# Patient Record
Sex: FEMALE | Race: WHITE | NOT HISPANIC OR LATINO | Employment: FULL TIME | URBAN - NONMETROPOLITAN AREA
[De-identification: names, ages, dates, MRNs, and addresses within clinical notes are randomized per-mention and may not be internally consistent; named-entity substitution may affect disease eponyms.]

---

## 2017-03-01 ENCOUNTER — HOSPITAL ENCOUNTER (OUTPATIENT)
Dept: GENERAL RADIOLOGY | Facility: HOSPITAL | Age: 47
Discharge: HOME | End: 2017-03-01
Attending: UROLOGY | Admitting: UROLOGY

## 2017-03-01 DIAGNOSIS — M79.601 PAIN OF RIGHT UPPER EXTREMITY: ICD-10-CM

## 2017-03-01 DIAGNOSIS — M79.601 PAIN OF RIGHT UPPER EXTREMITY: Primary | ICD-10-CM

## 2017-03-01 PROCEDURE — 73080 X-RAY EXAM OF ELBOW: CPT | Performed by: RADIOLOGY

## 2017-03-01 PROCEDURE — 73070 X-RAY EXAM OF ELBOW: CPT

## 2017-07-10 ENCOUNTER — TRANSCRIBE ORDERS (OUTPATIENT)
Dept: ADMINISTRATIVE | Facility: HOSPITAL | Age: 47
End: 2017-07-10

## 2017-07-10 DIAGNOSIS — Z12.31 VISIT FOR SCREENING MAMMOGRAM: Primary | ICD-10-CM

## 2017-07-21 ENCOUNTER — HOSPITAL ENCOUNTER (OUTPATIENT)
Dept: MAMMOGRAPHY | Facility: HOSPITAL | Age: 47
Discharge: HOME | End: 2017-07-21
Admitting: NURSE PRACTITIONER

## 2017-07-21 DIAGNOSIS — Z12.31 VISIT FOR SCREENING MAMMOGRAM: ICD-10-CM

## 2017-07-21 PROCEDURE — G0202 SCR MAMMO BI INCL CAD: HCPCS

## 2017-07-21 PROCEDURE — 77063 BREAST TOMOSYNTHESIS BI: CPT

## 2017-07-21 PROCEDURE — 77063 BREAST TOMOSYNTHESIS BI: CPT | Performed by: RADIOLOGY

## 2017-07-21 PROCEDURE — 77067 SCR MAMMO BI INCL CAD: CPT | Performed by: RADIOLOGY

## 2017-09-02 ENCOUNTER — HOSPITAL ENCOUNTER (EMERGENCY)
Facility: HOSPITAL | Age: 47
Discharge: HOME | End: 2017-09-02
Attending: EMERGENCY MEDICINE | Admitting: EMERGENCY MEDICINE

## 2017-09-02 ENCOUNTER — APPOINTMENT (OUTPATIENT)
Dept: GENERAL RADIOLOGY | Facility: HOSPITAL | Age: 47
End: 2017-09-02

## 2017-09-02 VITALS
RESPIRATION RATE: 16 BRPM | SYSTOLIC BLOOD PRESSURE: 151 MMHG | DIASTOLIC BLOOD PRESSURE: 80 MMHG | TEMPERATURE: 98.6 F | OXYGEN SATURATION: 98 % | BODY MASS INDEX: 31.58 KG/M2 | HEART RATE: 61 BPM | WEIGHT: 185 LBS | HEIGHT: 64 IN
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
## 2017-09-02 DIAGNOSIS — S80.01XA CONTUSION OF RIGHT KNEE, INITIAL ENCOUNTER: Primary | ICD-10-CM

## 2017-09-02 PROCEDURE — 73564 X-RAY EXAM KNEE 4 OR MORE: CPT | Performed by: RADIOLOGY

## 2017-09-02 PROCEDURE — 99283 EMERGENCY DEPT VISIT LOW MDM: CPT

## 2017-09-02 PROCEDURE — 73564 X-RAY EXAM KNEE 4 OR MORE: CPT

## 2017-09-02 RX ORDER — IBUPROFEN 600 MG/1
600 TABLET ORAL EVERY 8 HOURS PRN
Qty: 20 TABLET | Refills: 0 | Status: SHIPPED | OUTPATIENT
Start: 2017-09-02 | End: 2019-01-07

## 2017-09-02 RX ORDER — MULTIPLE VITAMINS W/ MINERALS TAB 9MG-400MCG
1 TAB ORAL DAILY
COMMUNITY
End: 2021-10-07

## 2017-09-02 NOTE — DISCHARGE INSTRUCTIONS
Follow up with [facility] on Tuesday for a re-evaluation and to determine when you can return to work.  Wear your ace wrap and ice and elevate.    Hypertension  Hypertension, commonly called high blood pressure, is when the force of blood pumping through your arteries is too strong. Your arteries are the blood vessels that carry blood from your heart throughout your body. A blood pressure reading consists of a higher number over a lower number, such as 110/72. The higher number (systolic) is the pressure inside your arteries when your heart pumps. The lower number (diastolic) is the pressure inside your arteries when your heart relaxes. Ideally you want your blood pressure below 120/80.  Hypertension forces your heart to work harder to pump blood. Your arteries may become narrow or stiff. Having untreated or uncontrolled hypertension can cause heart attack, stroke, kidney disease, and other problems.  RISK FACTORS  Some risk factors for high blood pressure are controllable. Others are not.   Risk factors you cannot control include:   · Race. You may be at higher risk if you are .  · Age. Risk increases with age.  · Gender. Men are at higher risk than women before age 45 years. After age 65, women are at higher risk than men.  Risk factors you can control include:  · Not getting enough exercise or physical activity.  · Being overweight.  · Getting too much fat, sugar, calories, or salt in your diet.  · Drinking too much alcohol.  SIGNS AND SYMPTOMS  Hypertension does not usually cause signs or symptoms. Extremely high blood pressure (hypertensive crisis) may cause headache, anxiety, shortness of breath, and nosebleed.  DIAGNOSIS  To check if you have hypertension, your health care provider will measure your blood pressure while you are seated, with your arm held at the level of your heart. It should be measured at least twice using the same arm. Certain conditions can cause a  difference in blood pressure between your right and left arms. A blood pressure reading that is higher than normal on one occasion does not mean that you need treatment. If it is not clear whether you have high blood pressure, you may be asked to return on a different day to have your blood pressure checked again. Or, you may be asked to monitor your blood pressure at home for 1 or more weeks.  TREATMENT  Treating high blood pressure includes making lifestyle changes and possibly taking medicine. Living a healthy lifestyle can help lower high blood pressure. You may need to change some of your habits.  Lifestyle changes may include:  · Following the DASH diet. This diet is high in fruits, vegetables, and whole grains. It is low in salt, red meat, and added sugars.  · Keep your sodium intake below 2,300 mg per day.  · Getting at least 30-45 minutes of aerobic exercise at least 4 times per week.  · Losing weight if necessary.  · Not smoking.  · Limiting alcoholic beverages.  · Learning ways to reduce stress.  Your health care provider may prescribe medicine if lifestyle changes are not enough to get your blood pressure under control, and if one of the following is true:  · You are 18-59 years of age and your systolic blood pressure is above 140.  · You are 60 years of age or older, and your systolic blood pressure is above 150.  · Your diastolic blood pressure is above 90.  · You have diabetes, and your systolic blood pressure is over 140 or your diastolic blood pressure is over 90.  · You have kidney disease and your blood pressure is above 140/90.  · You have heart disease and your blood pressure is above 140/90.  Your personal target blood pressure may vary depending on your medical conditions, your age, and other factors.  HOME CARE INSTRUCTIONS  · Have your blood pressure rechecked as directed by your health care provider.    · Take medicines only as directed by your health care provider. Follow the directions  carefully. Blood pressure medicines must be taken as prescribed. The medicine does not work as well when you skip doses. Skipping doses also puts you at risk for problems.  · Do not smoke.    · Monitor your blood pressure at home as directed by your health care provider.   SEEK MEDICAL CARE IF:   · You think you are having a reaction to medicines taken.  · You have recurrent headaches or feel dizzy.  · You have swelling in your ankles.  · You have trouble with your vision.  SEEK IMMEDIATE MEDICAL CARE IF:  · You develop a severe headache or confusion.  · You have unusual weakness, numbness, or feel faint.  · You have severe chest or abdominal pain.  · You vomit repeatedly.  · You have trouble breathing.  MAKE SURE YOU:   · Understand these instructions.  · Will watch your condition.  · Will get help right away if you are not doing well or get worse.     This information is not intended to replace advice given to you by your health care provider. Make sure you discuss any questions you have with your health care provider.     Document Released: 12/18/2006 Document Revised: 05/03/2016 Document Reviewed: 10/10/2014  [employer] Patient Education ©2017 [employer]

## 2017-09-02 NOTE — ED PROVIDER NOTES
Subjective   HPI Comments: 47-year-old female presents to the ED today complaining of right knee pain.  She states she fell yesterday at work.  She states she was coming down some steps and slipped and landed on her right knee.  She states she has pain with weightbearing and pain with flexion.  She denies any past injury to this knee.  She states she is able to bear weight but it is painful.    Patient is a 47 y.o. female presenting with lower extremity pain.   History provided by:  Patient  Lower Extremity Issue   Location:  Knee  Time since incident:  1 day  Injury: yes    Mechanism of injury: fall    Fall:     Fall occurred:  Down stairs    Impact surface:  Hard floor    Point of impact:  Knees    Entrapped after fall: no    Knee location:  R knee  Pain details:     Quality:  Throbbing    Radiates to:  Does not radiate    Severity:  Moderate    Onset quality:  Sudden    Timing:  Constant    Progression:  Worsening  Chronicity:  New  Dislocation: no    Prior injury to area:  No  Relieved by:  Nothing  Worsened by:  Activity and bearing weight  Associated symptoms: no back pain, no decreased ROM, no fatigue, no fever, no itching, no muscle weakness, no neck pain, no numbness, no stiffness, no swelling and no tingling        Review of Systems   Constitutional: Negative for fatigue and fever.   HENT: Negative.    Eyes: Negative.    Respiratory: Negative.    Cardiovascular: Negative.    Gastrointestinal: Negative.    Genitourinary: Negative.    Musculoskeletal: Positive for arthralgias. Negative for back pain, neck pain and stiffness.   Skin: Negative.  Negative for itching.   Neurological: Negative.    Psychiatric/Behavioral: Negative.    All other systems reviewed and are negative.      History reviewed. No pertinent past medical history.    Allergies   Allergen Reactions   • Penicillins        Past Surgical History:   Procedure Laterality Date   • TUBAL ABDOMINAL LIGATION         History reviewed. No pertinent  family history.    Social History     Social History   • Marital status:      Spouse name: N/A   • Number of children: N/A   • Years of education: N/A     Social History Main Topics   • Smoking status: Never Smoker   • Smokeless tobacco: None   • Alcohol use No   • Drug use: No   • Sexual activity: Defer     Other Topics Concern   • None     Social History Narrative   • None           Objective   Physical Exam   Constitutional: She is oriented to person, place, and time. She appears well-developed and well-nourished. No distress.   HENT:   Head: Normocephalic and atraumatic.   Right Ear: External ear normal.   Left Ear: External ear normal.   Nose: Nose normal.   Mouth/Throat: Oropharynx is clear and moist.   Eyes: Conjunctivae and EOM are normal. Pupils are equal, round, and reactive to light.   Neck: Normal range of motion. Neck supple.   Cardiovascular: Normal rate, regular rhythm, normal heart sounds and intact distal pulses.    Pulmonary/Chest: Effort normal and breath sounds normal. No respiratory distress.   Abdominal: Soft. Bowel sounds are normal. There is no tenderness.   Musculoskeletal: She exhibits tenderness. She exhibits no deformity.   Mild pain with flexion of the right knee, pain with palpation just below the right patella   Neurological: She is alert and oriented to person, place, and time.   Skin: Skin is warm and dry.   Psychiatric: She has a normal mood and affect. Her behavior is normal. Judgment and thought content normal.   Nursing note and vitals reviewed.      Splint application  Performed by: MJ GRANADOS  Authorized by: JANEE GARCIA   Location details: right knee  Splint type: ace wrap.  Supplies used: elastic bandage  Post-procedure: The splinted body part was neurovascularly unchanged following the procedure.  Patient tolerance: Patient tolerated the procedure well with no immediate complications               ED Course  ED Course   Comment By Time   No acute findings on x-ray  per Dr. Quiroz. LIA Ceron 09/02 1058                  MDM  Number of Diagnoses or Management Options  Contusion of right knee, initial encounter:   Fall down stairs, initial encounter:      Amount and/or Complexity of Data Reviewed  Tests in the radiology section of CPT®: reviewed    Patient Progress  Patient progress: stable      Final diagnoses:   Contusion of right knee, initial encounter   Fall down stairs, initial encounter            LIA Ceron  09/02/17 1518

## 2018-07-26 ENCOUNTER — APPOINTMENT (OUTPATIENT)
Dept: MAMMOGRAPHY | Facility: HOSPITAL | Age: 48
End: 2018-07-26

## 2018-07-30 ENCOUNTER — HOSPITAL ENCOUNTER (OUTPATIENT)
Dept: MAMMOGRAPHY | Facility: HOSPITAL | Age: 48
Discharge: HOME | End: 2018-07-30
Admitting: NURSE PRACTITIONER

## 2018-07-30 DIAGNOSIS — Z12.39 SCREENING BREAST EXAMINATION: ICD-10-CM

## 2018-07-30 PROCEDURE — 77067 SCR MAMMO BI INCL CAD: CPT | Performed by: RADIOLOGY

## 2018-07-30 PROCEDURE — 77067 SCR MAMMO BI INCL CAD: CPT

## 2018-07-30 PROCEDURE — 77063 BREAST TOMOSYNTHESIS BI: CPT

## 2018-07-30 PROCEDURE — 77063 BREAST TOMOSYNTHESIS BI: CPT | Performed by: RADIOLOGY

## 2018-10-02 ENCOUNTER — APPOINTMENT (OUTPATIENT)
Dept: GENERAL RADIOLOGY | Facility: HOSPITAL | Age: 48
End: 2018-10-02
Attending: SURGERY

## 2018-10-02 ENCOUNTER — APPOINTMENT (OUTPATIENT)
Dept: CT IMAGING | Facility: HOSPITAL | Age: 48
End: 2018-10-02

## 2018-10-02 ENCOUNTER — ANESTHESIA (OUTPATIENT)
Dept: PERIOP | Facility: HOSPITAL | Age: 48
End: 2018-10-02

## 2018-10-02 ENCOUNTER — ANESTHESIA EVENT (OUTPATIENT)
Dept: PERIOP | Facility: HOSPITAL | Age: 48
End: 2018-10-02

## 2018-10-02 ENCOUNTER — HOSPITAL ENCOUNTER (EMERGENCY)
Facility: HOSPITAL | Age: 48
Discharge: HOME | End: 2018-10-02
Attending: EMERGENCY MEDICINE | Admitting: SURGERY

## 2018-10-02 VITALS
WEIGHT: 185 LBS | HEART RATE: 91 BPM | OXYGEN SATURATION: 97 % | BODY MASS INDEX: 31.58 KG/M2 | TEMPERATURE: 98.8 F | HEIGHT: 64 IN | DIASTOLIC BLOOD PRESSURE: 56 MMHG | SYSTOLIC BLOOD PRESSURE: 133 MMHG | RESPIRATION RATE: 16 BRPM

## 2018-10-02 DIAGNOSIS — N39.0 ACUTE LOWER UTI: ICD-10-CM

## 2018-10-02 DIAGNOSIS — K35.80 ACUTE APPENDICITIS, UNSPECIFIED ACUTE APPENDICITIS TYPE: Primary | ICD-10-CM

## 2018-10-02 LAB
6-ACETYL MORPHINE: NEGATIVE
ALBUMIN SERPL-MCNC: 4.9 G/DL (ref 3.5–5)
ALBUMIN/GLOB SERPL: 1.4 G/DL (ref 1.5–2.5)
ALP SERPL-CCNC: 117 U/L (ref 35–104)
ALT SERPL W P-5'-P-CCNC: 40 U/L (ref 10–36)
AMPHET+METHAMPHET UR QL: NEGATIVE
ANION GAP SERPL CALCULATED.3IONS-SCNC: 13.8 MMOL/L (ref 3.6–11.2)
AST SERPL-CCNC: 29 U/L (ref 10–30)
B-HCG UR QL: NEGATIVE
BACTERIA UR QL AUTO: ABNORMAL /HPF
BARBITURATES UR QL SCN: NEGATIVE
BASOPHILS # BLD AUTO: 0.02 10*3/MM3 (ref 0–0.3)
BASOPHILS NFR BLD AUTO: 0.2 % (ref 0–2)
BENZODIAZ UR QL SCN: NEGATIVE
BILIRUB SERPL-MCNC: 0.9 MG/DL (ref 0.2–1.8)
BILIRUB UR QL STRIP: ABNORMAL
BUN BLD-MCNC: 12 MG/DL (ref 7–21)
BUN/CREAT SERPL: 11.3 (ref 7–25)
BUPRENORPHINE SERPL-MCNC: NEGATIVE NG/ML
CALCIUM SPEC-SCNC: 9.7 MG/DL (ref 7.7–10)
CANNABINOIDS SERPL QL: NEGATIVE
CHLORIDE SERPL-SCNC: 101 MMOL/L (ref 99–112)
CLARITY UR: ABNORMAL
CO2 SERPL-SCNC: 20.2 MMOL/L (ref 24.3–31.9)
COCAINE UR QL: NEGATIVE
COLOR UR: ABNORMAL
CREAT BLD-MCNC: 1.06 MG/DL (ref 0.43–1.29)
DEPRECATED RDW RBC AUTO: 45.4 FL (ref 37–54)
EOSINOPHIL # BLD AUTO: 0.01 10*3/MM3 (ref 0–0.7)
EOSINOPHIL NFR BLD AUTO: 0.1 % (ref 0–5)
ERYTHROCYTE [DISTWIDTH] IN BLOOD BY AUTOMATED COUNT: 14.3 % (ref 11.5–14.5)
GFR SERPL CREATININE-BSD FRML MDRD: 55 ML/MIN/1.73
GLOBULIN UR ELPH-MCNC: 3.4 GM/DL
GLUCOSE BLD-MCNC: 119 MG/DL (ref 70–110)
GLUCOSE UR STRIP-MCNC: NEGATIVE MG/DL
HCT VFR BLD AUTO: 42.6 % (ref 37–47)
HGB BLD-MCNC: 14.1 G/DL (ref 12–16)
HGB UR QL STRIP.AUTO: ABNORMAL
HYALINE CASTS UR QL AUTO: ABNORMAL /LPF
IMM GRANULOCYTES # BLD: 0.02 10*3/MM3 (ref 0–0.03)
IMM GRANULOCYTES NFR BLD: 0.2 % (ref 0–0.5)
KETONES UR QL STRIP: ABNORMAL
LEUKOCYTE ESTERASE UR QL STRIP.AUTO: ABNORMAL
LIPASE SERPL-CCNC: 44 U/L (ref 13–60)
LYMPHOCYTES # BLD AUTO: 1.68 10*3/MM3 (ref 1–3)
LYMPHOCYTES NFR BLD AUTO: 13.6 % (ref 21–51)
MCH RBC QN AUTO: 28.8 PG (ref 27–33)
MCHC RBC AUTO-ENTMCNC: 33.1 G/DL (ref 33–37)
MCV RBC AUTO: 87.1 FL (ref 80–94)
METHADONE UR QL SCN: NEGATIVE
MONOCYTES # BLD AUTO: 0.83 10*3/MM3 (ref 0.1–0.9)
MONOCYTES NFR BLD AUTO: 6.7 % (ref 0–10)
NEUTROPHILS # BLD AUTO: 9.78 10*3/MM3 (ref 1.4–6.5)
NEUTROPHILS NFR BLD AUTO: 79.2 % (ref 30–70)
NITRITE UR QL STRIP: NEGATIVE
OPIATES UR QL: NEGATIVE
OSMOLALITY SERPL CALC.SUM OF ELEC: 271 MOSM/KG (ref 273–305)
OXYCODONE UR QL SCN: NEGATIVE
PCP UR QL SCN: NEGATIVE
PH UR STRIP.AUTO: 5.5 [PH] (ref 5–8)
PLATELET # BLD AUTO: 285 10*3/MM3 (ref 130–400)
PMV BLD AUTO: 10.6 FL (ref 6–10)
POTASSIUM BLD-SCNC: 3.4 MMOL/L (ref 3.5–5.3)
PROT SERPL-MCNC: 8.3 G/DL (ref 6–8)
PROT UR QL STRIP: ABNORMAL
RBC # BLD AUTO: 4.89 10*6/MM3 (ref 4.2–5.4)
RBC # UR: ABNORMAL /HPF
REF LAB TEST METHOD: ABNORMAL
SODIUM BLD-SCNC: 135 MMOL/L (ref 135–153)
SP GR UR STRIP: 1.03 (ref 1–1.03)
SQUAMOUS #/AREA URNS HPF: ABNORMAL /HPF
TRANS CELLS #/AREA URNS HPF: ABNORMAL /HPF
UROBILINOGEN UR QL STRIP: ABNORMAL
WBC NRBC COR # BLD: 12.34 10*3/MM3 (ref 4.5–12.5)
WBC UR QL AUTO: ABNORMAL /HPF
YEAST URNS QL MICRO: ABNORMAL /HPF

## 2018-10-02 PROCEDURE — 25010000002 KETOROLAC TROMETHAMINE PER 15 MG: Performed by: NURSE ANESTHETIST, CERTIFIED REGISTERED

## 2018-10-02 PROCEDURE — 80307 DRUG TEST PRSMV CHEM ANLYZR: CPT | Performed by: PHYSICIAN ASSISTANT

## 2018-10-02 PROCEDURE — 74176 CT ABD & PELVIS W/O CONTRAST: CPT

## 2018-10-02 PROCEDURE — 96375 TX/PRO/DX INJ NEW DRUG ADDON: CPT

## 2018-10-02 PROCEDURE — 25010000002 DEXAMETHASONE PER 1 MG: Performed by: NURSE ANESTHETIST, CERTIFIED REGISTERED

## 2018-10-02 PROCEDURE — 81025 URINE PREGNANCY TEST: CPT | Performed by: EMERGENCY MEDICINE

## 2018-10-02 PROCEDURE — 74176 CT ABD & PELVIS W/O CONTRAST: CPT | Performed by: RADIOLOGY

## 2018-10-02 PROCEDURE — 25010000002 ONDANSETRON PER 1 MG: Performed by: NURSE ANESTHETIST, CERTIFIED REGISTERED

## 2018-10-02 PROCEDURE — 25010000002 LEVOFLOXACIN PER 250 MG: Performed by: PHYSICIAN ASSISTANT

## 2018-10-02 PROCEDURE — 44970 LAPAROSCOPY APPENDECTOMY: CPT | Performed by: SURGERY

## 2018-10-02 PROCEDURE — 25010000002 KETOROLAC TROMETHAMINE PER 15 MG: Performed by: PHYSICIAN ASSISTANT

## 2018-10-02 PROCEDURE — 85025 COMPLETE CBC W/AUTO DIFF WBC: CPT | Performed by: PHYSICIAN ASSISTANT

## 2018-10-02 PROCEDURE — 25010000002 MIDAZOLAM PER 1 MG: Performed by: NURSE ANESTHETIST, CERTIFIED REGISTERED

## 2018-10-02 PROCEDURE — 99284 EMERGENCY DEPT VISIT MOD MDM: CPT | Performed by: SURGERY

## 2018-10-02 PROCEDURE — 99283 EMERGENCY DEPT VISIT LOW MDM: CPT

## 2018-10-02 PROCEDURE — 83690 ASSAY OF LIPASE: CPT | Performed by: PHYSICIAN ASSISTANT

## 2018-10-02 PROCEDURE — 25010000002 PROPOFOL 10 MG/ML EMULSION: Performed by: NURSE ANESTHETIST, CERTIFIED REGISTERED

## 2018-10-02 PROCEDURE — 96361 HYDRATE IV INFUSION ADD-ON: CPT

## 2018-10-02 PROCEDURE — 36415 COLL VENOUS BLD VENIPUNCTURE: CPT

## 2018-10-02 PROCEDURE — 96365 THER/PROPH/DIAG IV INF INIT: CPT

## 2018-10-02 PROCEDURE — 81001 URINALYSIS AUTO W/SCOPE: CPT | Performed by: PHYSICIAN ASSISTANT

## 2018-10-02 PROCEDURE — 80053 COMPREHEN METABOLIC PANEL: CPT | Performed by: PHYSICIAN ASSISTANT

## 2018-10-02 RX ORDER — SCOLOPAMINE TRANSDERMAL SYSTEM 1 MG/1
1 PATCH, EXTENDED RELEASE TRANSDERMAL CONTINUOUS
Status: DISCONTINUED | OUTPATIENT
Start: 2018-10-02 | End: 2018-10-02 | Stop reason: HOSPADM

## 2018-10-02 RX ORDER — SODIUM CHLORIDE 0.9 % (FLUSH) 0.9 %
3-10 SYRINGE (ML) INJECTION AS NEEDED
Status: DISCONTINUED | OUTPATIENT
Start: 2018-10-02 | End: 2018-10-02 | Stop reason: HOSPADM

## 2018-10-02 RX ORDER — MEPERIDINE HYDROCHLORIDE 25 MG/ML
12.5 INJECTION INTRAMUSCULAR; INTRAVENOUS; SUBCUTANEOUS
Status: DISCONTINUED | OUTPATIENT
Start: 2018-10-02 | End: 2018-10-02 | Stop reason: HOSPADM

## 2018-10-02 RX ORDER — LIDOCAINE HYDROCHLORIDE 20 MG/ML
INJECTION, SOLUTION EPIDURAL; INFILTRATION; INTRACAUDAL; PERINEURAL AS NEEDED
Status: DISCONTINUED | OUTPATIENT
Start: 2018-10-02 | End: 2018-10-02 | Stop reason: SURG

## 2018-10-02 RX ORDER — OXYCODONE AND ACETAMINOPHEN 7.5; 325 MG/1; MG/1
1 TABLET ORAL EVERY 6 HOURS PRN
Qty: 25 TABLET | Refills: 0 | Status: SHIPPED | OUTPATIENT
Start: 2018-10-02 | End: 2018-10-10 | Stop reason: SDUPTHER

## 2018-10-02 RX ORDER — METOPROLOL TARTRATE 5 MG/5ML
INJECTION INTRAVENOUS AS NEEDED
Status: DISCONTINUED | OUTPATIENT
Start: 2018-10-02 | End: 2018-10-02 | Stop reason: SURG

## 2018-10-02 RX ORDER — MIDAZOLAM HYDROCHLORIDE 1 MG/ML
2 INJECTION INTRAMUSCULAR; INTRAVENOUS
Status: DISCONTINUED | OUTPATIENT
Start: 2018-10-02 | End: 2018-10-02 | Stop reason: HOSPADM

## 2018-10-02 RX ORDER — PROPOFOL 10 MG/ML
VIAL (ML) INTRAVENOUS AS NEEDED
Status: DISCONTINUED | OUTPATIENT
Start: 2018-10-02 | End: 2018-10-02 | Stop reason: SURG

## 2018-10-02 RX ORDER — FAMOTIDINE 10 MG/ML
INJECTION, SOLUTION INTRAVENOUS AS NEEDED
Status: DISCONTINUED | OUTPATIENT
Start: 2018-10-02 | End: 2018-10-02 | Stop reason: SURG

## 2018-10-02 RX ORDER — ONDANSETRON 2 MG/ML
4 INJECTION INTRAMUSCULAR; INTRAVENOUS ONCE AS NEEDED
Status: DISCONTINUED | OUTPATIENT
Start: 2018-10-02 | End: 2018-10-02 | Stop reason: HOSPADM

## 2018-10-02 RX ORDER — LEVOFLOXACIN 500 MG/1
500 TABLET, FILM COATED ORAL DAILY
Qty: 10 TABLET | Refills: 0 | Status: SHIPPED | OUTPATIENT
Start: 2018-10-02 | End: 2018-10-12

## 2018-10-02 RX ORDER — SODIUM CHLORIDE, SODIUM LACTATE, POTASSIUM CHLORIDE, CALCIUM CHLORIDE 600; 310; 30; 20 MG/100ML; MG/100ML; MG/100ML; MG/100ML
125 INJECTION, SOLUTION INTRAVENOUS CONTINUOUS
Status: DISCONTINUED | OUTPATIENT
Start: 2018-10-02 | End: 2018-10-02 | Stop reason: HOSPADM

## 2018-10-02 RX ORDER — ONDANSETRON 2 MG/ML
INJECTION INTRAMUSCULAR; INTRAVENOUS AS NEEDED
Status: DISCONTINUED | OUTPATIENT
Start: 2018-10-02 | End: 2018-10-02 | Stop reason: SURG

## 2018-10-02 RX ORDER — MIDAZOLAM HYDROCHLORIDE 1 MG/ML
1 INJECTION INTRAMUSCULAR; INTRAVENOUS
Status: DISCONTINUED | OUTPATIENT
Start: 2018-10-02 | End: 2018-10-02 | Stop reason: HOSPADM

## 2018-10-02 RX ORDER — DEXAMETHASONE SODIUM PHOSPHATE 10 MG/ML
INJECTION INTRAMUSCULAR; INTRAVENOUS AS NEEDED
Status: DISCONTINUED | OUTPATIENT
Start: 2018-10-02 | End: 2018-10-02 | Stop reason: SURG

## 2018-10-02 RX ORDER — SODIUM CHLORIDE 9 MG/ML
INJECTION, SOLUTION INTRAVENOUS AS NEEDED
Status: DISCONTINUED | OUTPATIENT
Start: 2018-10-02 | End: 2018-10-02 | Stop reason: HOSPADM

## 2018-10-02 RX ORDER — MIDAZOLAM HYDROCHLORIDE 1 MG/ML
INJECTION INTRAMUSCULAR; INTRAVENOUS AS NEEDED
Status: DISCONTINUED | OUTPATIENT
Start: 2018-10-02 | End: 2018-10-02 | Stop reason: SURG

## 2018-10-02 RX ORDER — SODIUM CHLORIDE 0.9 % (FLUSH) 0.9 %
3 SYRINGE (ML) INJECTION EVERY 12 HOURS SCHEDULED
Status: DISCONTINUED | OUTPATIENT
Start: 2018-10-02 | End: 2018-10-02 | Stop reason: HOSPADM

## 2018-10-02 RX ORDER — MEPERIDINE HYDROCHLORIDE 25 MG/ML
INJECTION INTRAMUSCULAR; INTRAVENOUS; SUBCUTANEOUS AS NEEDED
Status: DISCONTINUED | OUTPATIENT
Start: 2018-10-02 | End: 2018-10-02 | Stop reason: SURG

## 2018-10-02 RX ORDER — MAGNESIUM HYDROXIDE 1200 MG/15ML
LIQUID ORAL AS NEEDED
Status: DISCONTINUED | OUTPATIENT
Start: 2018-10-02 | End: 2018-10-02 | Stop reason: HOSPADM

## 2018-10-02 RX ORDER — SODIUM CHLORIDE 0.9 % (FLUSH) 0.9 %
10 SYRINGE (ML) INJECTION AS NEEDED
Status: CANCELLED | OUTPATIENT
Start: 2018-10-02

## 2018-10-02 RX ORDER — LEVOFLOXACIN 5 MG/ML
500 INJECTION, SOLUTION INTRAVENOUS ONCE
Status: COMPLETED | OUTPATIENT
Start: 2018-10-02 | End: 2018-10-02

## 2018-10-02 RX ORDER — KETOROLAC TROMETHAMINE 30 MG/ML
INJECTION, SOLUTION INTRAMUSCULAR; INTRAVENOUS AS NEEDED
Status: DISCONTINUED | OUTPATIENT
Start: 2018-10-02 | End: 2018-10-02 | Stop reason: SURG

## 2018-10-02 RX ORDER — IPRATROPIUM BROMIDE AND ALBUTEROL SULFATE 2.5; .5 MG/3ML; MG/3ML
3 SOLUTION RESPIRATORY (INHALATION) ONCE AS NEEDED
Status: DISCONTINUED | OUTPATIENT
Start: 2018-10-02 | End: 2018-10-02 | Stop reason: HOSPADM

## 2018-10-02 RX ORDER — FENTANYL CITRATE 50 UG/ML
50 INJECTION, SOLUTION INTRAMUSCULAR; INTRAVENOUS
Status: DISCONTINUED | OUTPATIENT
Start: 2018-10-02 | End: 2018-10-02 | Stop reason: HOSPADM

## 2018-10-02 RX ORDER — SODIUM CHLORIDE 0.9 % (FLUSH) 0.9 %
10 SYRINGE (ML) INJECTION AS NEEDED
Status: DISCONTINUED | OUTPATIENT
Start: 2018-10-02 | End: 2018-10-02 | Stop reason: HOSPADM

## 2018-10-02 RX ORDER — OXYCODONE HYDROCHLORIDE AND ACETAMINOPHEN 5; 325 MG/1; MG/1
1 TABLET ORAL ONCE AS NEEDED
Status: DISCONTINUED | OUTPATIENT
Start: 2018-10-02 | End: 2018-10-02 | Stop reason: HOSPADM

## 2018-10-02 RX ORDER — KETOROLAC TROMETHAMINE 30 MG/ML
30 INJECTION, SOLUTION INTRAMUSCULAR; INTRAVENOUS ONCE
Status: COMPLETED | OUTPATIENT
Start: 2018-10-02 | End: 2018-10-02

## 2018-10-02 RX ORDER — ROCURONIUM BROMIDE 10 MG/ML
INJECTION, SOLUTION INTRAVENOUS AS NEEDED
Status: DISCONTINUED | OUTPATIENT
Start: 2018-10-02 | End: 2018-10-02 | Stop reason: SURG

## 2018-10-02 RX ORDER — ONDANSETRON 4 MG/1
4 TABLET, ORALLY DISINTEGRATING ORAL EVERY 8 HOURS PRN
Qty: 15 TABLET | Refills: 0 | Status: SHIPPED | OUTPATIENT
Start: 2018-10-02 | End: 2019-01-07

## 2018-10-02 RX ADMIN — MIDAZOLAM HYDROCHLORIDE 2 MG: 1 INJECTION, SOLUTION INTRAMUSCULAR; INTRAVENOUS at 12:18

## 2018-10-02 RX ADMIN — LIDOCAINE HYDROCHLORIDE 60 MG: 20 INJECTION, SOLUTION EPIDURAL; INFILTRATION; INTRACAUDAL; PERINEURAL at 12:24

## 2018-10-02 RX ADMIN — MEPERIDINE HYDROCHLORIDE 25 MG: 25 INJECTION, SOLUTION INTRAMUSCULAR; INTRAVENOUS; SUBCUTANEOUS at 12:55

## 2018-10-02 RX ADMIN — ONDANSETRON 4 MG: 2 INJECTION, SOLUTION INTRAMUSCULAR; INTRAVENOUS at 12:18

## 2018-10-02 RX ADMIN — ROCURONIUM BROMIDE 30 MG: 10 INJECTION INTRAVENOUS at 12:25

## 2018-10-02 RX ADMIN — OXYCODONE HYDROCHLORIDE AND ACETAMINOPHEN 1 TABLET: 5; 325 TABLET ORAL at 15:53

## 2018-10-02 RX ADMIN — SODIUM CHLORIDE 2517 ML: 9 INJECTION, SOLUTION INTRAVENOUS at 09:46

## 2018-10-02 RX ADMIN — ROCURONIUM BROMIDE 5 MG: 10 INJECTION INTRAVENOUS at 13:03

## 2018-10-02 RX ADMIN — DEXAMETHASONE SODIUM PHOSPHATE 8 MG: 10 INJECTION INTRAMUSCULAR; INTRAVENOUS at 12:28

## 2018-10-02 RX ADMIN — SODIUM CHLORIDE, POTASSIUM CHLORIDE, SODIUM LACTATE AND CALCIUM CHLORIDE 125 ML/HR: 600; 310; 30; 20 INJECTION, SOLUTION INTRAVENOUS at 11:35

## 2018-10-02 RX ADMIN — SODIUM CHLORIDE 1000 ML: 9 INJECTION, SOLUTION INTRAVENOUS at 08:52

## 2018-10-02 RX ADMIN — LEVOFLOXACIN 500 MG: 5 INJECTION, SOLUTION INTRAVENOUS at 09:38

## 2018-10-02 RX ADMIN — MEPERIDINE HYDROCHLORIDE 25 MG: 25 INJECTION, SOLUTION INTRAMUSCULAR; INTRAVENOUS; SUBCUTANEOUS at 12:49

## 2018-10-02 RX ADMIN — METOPROLOL TARTRATE 2 MG: 1 INJECTION, SOLUTION INTRAVENOUS at 13:28

## 2018-10-02 RX ADMIN — KETOROLAC TROMETHAMINE 30 MG: 30 INJECTION, SOLUTION INTRAMUSCULAR; INTRAVENOUS at 13:16

## 2018-10-02 RX ADMIN — PROPOFOL 100 MG: 10 INJECTION, EMULSION INTRAVENOUS at 12:24

## 2018-10-02 RX ADMIN — KETOROLAC TROMETHAMINE 30 MG: 30 INJECTION, SOLUTION INTRAMUSCULAR at 08:52

## 2018-10-02 RX ADMIN — FAMOTIDINE 20 MG: 10 INJECTION, SOLUTION INTRAVENOUS at 12:28

## 2018-10-02 RX ADMIN — SCOPALAMINE 1 PATCH: 1 PATCH, EXTENDED RELEASE TRANSDERMAL at 11:33

## 2018-10-02 NOTE — OP NOTE
Laparoscopic Appendectomy Procedure Note    Surgeon:  Shey Vera M.D., ISAAC    Assistant;  Alejandro Carlson  10/2/2018    Pre-op Diagnosis:   appendicitis    Post-op Diagnosis:     appendicitis    Anesthesia:  general    Indication:  appendicitis    Procedure:  After obtaining informed consent and receiving preoperative antibiotic and with venous compression boots in place patient was taken to the operating room and placed in the supine position.  After administration of general endotracheal anesthesia the abdomen was prepped and draped in a sterile fashion.  An incision was made above the umbilicus and the Veress needle was inserted.  Pneumoperitoneum was obtained to 15 mmHg and the 12 mm trocar was placed.  Camera was inserted, confirming position within the abdomen.  The patient was then placed in Trendelenburg right side up and under direct visualization a left lower quadrant and suprapubic 5 mm trocar was placed.     The appendix was walled off by omentum.  After the omentum was peeled away a markedly inflamed cecum, appendix, terminal ileum were identified.  The appendix was identified and noted to be necrotic.  It was freed from the surrounding structures.  The dissection was difficult due to the degree of inflammation which made it extremely difficult even grasp and mobilize the tissue.  Lateral attachments were freed from the right colon to mobilize the right colon lateral to medial.  The Harmonic Scalpel was utilized to divide some of the inflammatory tissue.  The mesentery of the appendix was divided with the vascular Endo MAL.  The base of the appendix was divided with the nonvascular Endo MAL.  The abdomen was then irrigated with a liter of fluid  The appendix was then removed through the umbilical port.  The umbilical fascia was closed with 0 Vicryl sutures.  Incisions were closed with 4.0 Vicryl subcuticular sutures. Dressing was placed.    Patient tolerated the procedure  well, was extubated in the operating room and taken to the recovery room in stable condition    Anesthesia: Gen.    Staff:   Circulator: Brandee Vora RN  Scrub Person: Alejandro Chaney; Kenia Leslie        Estimated Blood Loss: minimal    Blood administered:  none    Specimens:                  Order Name Source Comment Collection Info Order Time   SURGICAL PATHOLOGY EXAM Large Intestine, Appendix  Collected By: Shey Vera MD 10/2/2018  1:10 PM    Collection Date  10/2/2018       Collection Time   1:10 PM          Grafts and Implants: None  Drains: none    Findings: Markedly inflamed appendix which was necrotic.  No abscesses identified.  Small bowel with normal appearance suggesting that findings on CT related to inflammation from appendix.  The enlarged mesenteric lymph node was identified but was not sufficient exposure to allow for laparoscopic biopsy.    Complications:       Shey Vera MD     Date: 10/2/2018  Time: 1:29 PM

## 2018-10-02 NOTE — ED PROVIDER NOTES
Subjective   This is a 48-year-old female comes in with chief complaint 2 day history of right-sided flank and right lower abdominal pain.  Patient describes pain as sharp, stabbing.  She denies any associated nausea, vomiting, fever, diarrhea, constipation.  Patient does state she has been working out a lot lately and may have pulled a abdominal muscle.  She denies any history of abdominal surgeries.  Denies any significant past medical history.        History provided by:  Patient   used: No    Flank Pain   Pain location:  R flank  Pain quality: sharp and stabbing    Pain radiates to:  RLQ  Pain severity:  Moderate  Onset quality:  Sudden  Duration:  2 days  Timing:  Intermittent  Progression:  Worsening  Chronicity:  New  Context: not alcohol use, not diet changes, not eating, not medication withdrawal, not previous surgeries, not recent sexual activity, not retching, not sick contacts and not suspicious food intake    Relieved by:  Nothing  Worsened by:  Nothing  Ineffective treatments:  None tried  Associated symptoms: no belching, no chills, no dysuria, no flatus, no hematemesis, no nausea, no vaginal bleeding and no vomiting    Risk factors: no alcohol abuse, no aspirin use, has not had multiple surgeries, no NSAID use and not pregnant        Review of Systems   Constitutional: Negative for chills.   Gastrointestinal: Negative for flatus, hematemesis, nausea and vomiting.   Genitourinary: Positive for flank pain. Negative for dysuria, frequency and vaginal bleeding.   Musculoskeletal: Negative for arthralgias, back pain, gait problem and joint swelling.   All other systems reviewed and are negative.      History reviewed. No pertinent past medical history.    Allergies   Allergen Reactions   • Penicillins        Past Surgical History:   Procedure Laterality Date   • TUBAL ABDOMINAL LIGATION     • WISDOM TOOTH EXTRACTION         Family History   Problem Relation Age of Onset   • Breast  cancer Neg Hx        Social History     Social History   • Marital status:      Social History Main Topics   • Smoking status: Never Smoker   • Alcohol use No   • Drug use: No   • Sexual activity: Defer     Other Topics Concern   • Not on file           Objective   Physical Exam   Constitutional: She is oriented to person, place, and time. She appears well-developed and well-nourished. No distress.   HENT:   Head: Normocephalic.   Right Ear: External ear normal.   Left Ear: External ear normal.   Nose: Nose normal.   Mouth/Throat: Oropharynx is clear and moist.   Eyes: Pupils are equal, round, and reactive to light. Conjunctivae and EOM are normal. Right eye exhibits no discharge. Left eye exhibits no discharge. No scleral icterus.   Neck: Normal range of motion. Neck supple. No JVD present. No tracheal deviation present. No thyromegaly present.   Cardiovascular: Normal rate, regular rhythm, normal heart sounds and intact distal pulses.  Exam reveals no friction rub.    No murmur heard.  Pulmonary/Chest: Effort normal and breath sounds normal. No stridor. No respiratory distress. She has no wheezes. She has no rales.   Abdominal: Soft. Bowel sounds are normal. She exhibits no distension and no mass. There is no hepatosplenomegaly. There is tenderness in the right lower quadrant. There is guarding. There is no rebound and negative Antonio's sign.   Musculoskeletal: Normal range of motion. She exhibits no edema, tenderness or deformity.   Neurological: She is alert and oriented to person, place, and time. She displays normal reflexes. No cranial nerve deficit. She exhibits normal muscle tone. Coordination normal.   Skin: Skin is warm. Capillary refill takes less than 2 seconds. No rash noted. She is not diaphoretic. No erythema. No pallor.   Psychiatric: She has a normal mood and affect. Her behavior is normal. Judgment and thought content normal.   Nursing note and vitals reviewed.      Procedures           ED  Course  ED Course as of Oct 02 1601   e Oct 02, 2018   0909 Discuss care with Dr. Vera will come evaluate and see patient.  Was notified by radiologist patient does have acute appendicitis.  []   0931 Patient did not wish to have anything for pain at this time.  []      ED Course User Index  [] Willie Yeung PA-C                  [facility]      Final diagnoses:   Acute appendicitis, unspecified acute appendicitis type   Acute lower UTI            Willie Yeung PA-C  10/02/18 1601

## 2018-10-02 NOTE — ED NOTES
Consent signed for surgery. Surgery here to talk pt to pre op.      Randa Ash, PREMA  10/02/18 [id number]       Randa Ash, PREMA  10/02/18 3595

## 2018-10-02 NOTE — ANESTHESIA PROCEDURE NOTES
Airway  Urgency: elective    Airway not difficult    General Information and Staff    Patient location during procedure: OR    Indications and Patient Condition  Indications for airway management: airway protection    Preoxygenated: yes  MILS maintained throughout  Mask difficulty assessment: 1 - vent by mask    Final Airway Details  Final airway type: endotracheal airway      Successful airway: ETT  Cuffed: yes   Successful intubation technique: direct laryngoscopy  Facilitating devices/methods: intubating stylet  Endotracheal tube insertion site: oral  Blade: Harika  Blade size: 3  ETT size: 7.0 mm  Cormack-Lehane Classification: grade IIa - partial view of glottis  Placement verified by: chest auscultation and capnometry   Measured from: lips  ETT to lips (cm): 21  Number of attempts at approach: 1

## 2018-10-02 NOTE — ED NOTES
talking with pt. Questions answered. Risks and benefits explained to pt. Pt verbalized understanding.      Randa Ash RN  10/02/18 0310

## 2018-10-02 NOTE — ANESTHESIA PREPROCEDURE EVALUATION
Anesthesia Evaluation     no history of anesthetic complications:  NPO Solid Status: > 8 hours  NPO Liquid Status: > 8 hours           Airway   Mallampati: I  TM distance: >3 FB  Neck ROM: full  No difficulty expected  Dental - normal exam     Pulmonary - normal exam   (-) not a smoker  Cardiovascular - normal exam        Neuro/Psych  GI/Hepatic/Renal/Endo      Musculoskeletal     Abdominal  - normal exam    Bowel sounds: normal.   Substance History      OB/GYN          Other                        Anesthesia Plan    ASA 2     general     intravenous induction   Anesthetic plan, all risks, benefits, and alternatives have been provided, discussed and informed consent has been obtained with: patient.

## 2018-10-02 NOTE — ED NOTES
Pt left with surgery team. Belongings with pt. Needs addressed. NADN or pain.     Randa Ash RN  10/02/18 [id number]

## 2018-10-02 NOTE — ANESTHESIA POSTPROCEDURE EVALUATION
Patient: Jaquan Carlson    Procedure Summary     Date:  10/02/18 Room / Location:   [facility] 02 /  [facility]    Anesthesia Start:  1218 Anesthesia Stop:  1330    Procedure:  APPENDECTOMY LAPAROSCOPIC (N/A Abdomen) Diagnosis:       Acute appendicitis, unspecified acute appendicitis type      (Acute appendicitis, unspecified acute appendicitis type [K35.80])    Surgeon:  Shey Vera MD Provider:  Alton Gustafson MD    Anesthesia Type:  general ASA Status:  2          Anesthesia Type: general  Last vitals  BP   126/68 (10/02/18 1419)   Temp   98.1 °F (36.7 °C) (10/02/18 1419)   Pulse   98 (10/02/18 1419)   Resp   15 (10/02/18 1419)     SpO2   95 % (10/02/18 1419)     Post Anesthesia Care and Evaluation    Patient location during evaluation: PHASE II  Patient participation: complete - patient participated  Level of consciousness: awake and alert  Pain score: 1  Pain management: adequate  Airway patency: patent  Anesthetic complications: No anesthetic complications  PONV Status: controlled  Cardiovascular status: acceptable  Respiratory status: acceptable  Hydration status: acceptable

## 2018-10-02 NOTE — H&P
Chief complaint appendicitis    Subjective     Patient is a 48 y.o. female who presents to the ED with periumbilical pain which started Saturday, now localized to the RLQ.  Pain has become constant and progressively worse.  Positive for anorexia.  Negative for fever or chills.  Negative for nausea or vomiting.  Negative for diarrhea.  Patient states that she has been working out and when she initially got the pain she thought it was pulled muscle but it got progressively worse.  The patient denies any unexplained weight loss.  She denies any chronic GI symptomatology.  She denies any chronic diarrhea.  She denies any personal or family history for Crohn's disease or inflammatory bowel disease      Review of Systems  Review of Systems - General ROS: negative for unplanned weight loss  Psychological ROS: negative for - behavioral disorder  Ophthalmic ROS: negative for - dry eyes  ENT ROS: negative for - vertigo or vocal changes  Hematological and Lymphatic ROS: negative for - jaundice or swollen lymph nodes  Respiratory ROS: negative for - sputum changes or stridor  Cardiovascular ROS: negative for - irregular heartbeat or murmur  Gastrointestinal ROS: negative for - blood in stools or change in stools  Genito-Urinary ROS: negative for - hematuria or incontinence  Musculoskeletal ROS: negative for - gait disturbance      History  History reviewed. No pertinent past medical history.  Past Surgical History:   Procedure Laterality Date   • TUBAL ABDOMINAL LIGATION       Family History   Problem Relation Age of Onset   • Breast cancer Neg Hx      Social History   Substance Use Topics   • Smoking status: Never Smoker   • Smokeless tobacco: Not on file   • Alcohol use No       (Not in a hospital admission)  Allergies:  Penicillins - rash    Objective     Vital Signs  Temp:  [99.4 °F (37.4 °C)] 99.4 °F (37.4 °C)  Heart Rate:  [124] 124  Resp:  [18] 18  BP: (137-158)/(74-79) 158/79    Physical Exam  General:  This is a  WD WN white female in no acute distress  Vital signs stable, afebrile  HEENT exam:  WNL. Sclera are anicteric.  EOMI. Ace is flushed  Neck:  supple, FROM.  No JVD.  Trachea midline  Lungs:  Respiratory effort normal. Auscultation: Clear, without wheezes, rhonchi, rales  Heart:  Regular rate and rhythm, without murmur, gallop, rub.  No pedal edema  Abdomen: Active bowel sounds present.  Right lower quadrant tenderness without rebound or guarding.  No palpable mass  Musculoskeletal:  muscle strength/tone is normal.    Psyc:  alert, oriented x 3.  Mood and affect are appropriate  skin:  Warm with good turgor.  Without rash or lesion  extremities:  Examination of the extremities revealed no cyanosis, clubbing or edema.    Results Review:         Results from last 7 days  Lab Units 10/02/18  0850   WBC 10*3/mm3 12.34   HEMOGLOBIN g/dL 14.1   HEMATOCRIT % 42.6   PLATELETS 10*3/mm3 285           Results from last 7 days  Lab Units 10/02/18  0850   SODIUM mmol/L 135   POTASSIUM mmol/L 3.4*   CHLORIDE mmol/L 101   CO2 mmol/L 20.2*   BUN mg/dL 12   CREATININE mg/dL 1.06   EGFR IF NONAFRICN AM mL/min/1.73 55*   CALCIUM mg/dL 9.7   GLUCOSE mg/dL 119*   ALBUMIN g/dL 4.90   BILIRUBIN mg/dL 0.9   ALK PHOS U/L 117*   AST (SGOT) U/L 29   ALT (SGPT) U/L 40*   Estimated Creatinine Clearance: 67.3 mL/min (by C-G formula based on SCr of 1.06 mg/dL).  No results found for: AMMONIA         Imaging:  Imaging Results (last 24 hours)     Procedure Component Value Units Date/Time    CT Abdomen Pelvis Stone Protocol [[id number]] Collected:  10/02/18 0904     Updated:  10/02/18 0910    Narrative:       CT ABDOMEN PELVIS STONE PROTOCOL-     CLINICAL INDICATION: Flank pain, stone disease suspected          COMPARISON: None available     TECHNIQUE: Axial images were acquired from the lung bases through the  pubic symphysis without any IV or oral contrast.  Reformatted images were created in both the coronal and sagittal planes.     Radiation dose  reduction techniques were utilized per ALARA protocol.  Automated exposure control was initiated through either or CareDose or  DoseRight software packages by  protocol.           FINDINGS:   The lung bases are clear. There are no pleural effusions.     The liver is homogeneous. There is no evidence of focal hepatic mass     The spleen is homogeneous     There is minimal stranding just inferior to the body of the pancreas.  Also there are enlarged lymph nodes in this same area. The largest  measures 2 cm on image 55 of the axial series..     There is no adrenal enlargement.     The kidneys show no evidence of hydronephrosis or hydroureter. I do not  see any distal ureteral stones.      There is trace free fluid adjacent to the appendix. There is an  appendicolith, distention of the appendix, and possibly a tiny  periappendiceal abscess.     There is thickening of the terminal ileum. This may be secondary to what  appears to be appendicitis but clinical correlation and follow-up is  suggested.     There is no evidence of mesenteric or retroperitoneal adenopathy               Impression:       :   1. Appearance concerning for appendicitis  2. Mesenteric adenopathy and minimal stranding in the mid mesentery  3. Thickening of the terminal ileal wall. Etiology is uncertain, but  cannot exclude inflammatory bowel disease.     I have discussed these findings with Willie Yeung in the emergency  department                This report was finalized on 10/2/2018 9:07 AM by Dr. Amadou Perea MD.           CT scan reports and images were reviewed, personally reviewed with radiology and reviewed with the patient      Impression:  Patient Active Problem List   Diagnosis Code   • Acute appendicitis K35.80   Enlarged mesenteric lymph nodes  Thickened terminal ileum    Plan:  Laparoscopic appendectomy, indicated procedures      Discussion:  The patient clearly has acute appendicitis.  It is not clear the significance of  the thickened terminal ileum and the enlarged lymph node.  Effort will be made to visualize these areas at the time of appendectomy although it is unlikely that the mesenteric lymph node will be visualized.  Discussed the need for short-term follow-up of the other CT findings with the patient  Errors end dictation may reflect use of voice recognition software and not all errors in transcription may have been detected prior to signing.  Shey Vera MD  10/02/18  10:16 AM

## 2018-10-04 LAB
LAB AP CASE REPORT: NORMAL
PATH REPORT.FINAL DX SPEC: NORMAL

## 2018-10-08 ENCOUNTER — OFFICE VISIT (OUTPATIENT)
Dept: SURGERY | Facility: CLINIC | Age: 48
End: 2018-10-08

## 2018-10-08 VITALS
HEART RATE: 95 BPM | SYSTOLIC BLOOD PRESSURE: 139 MMHG | BODY MASS INDEX: 31.76 KG/M2 | WEIGHT: 186 LBS | DIASTOLIC BLOOD PRESSURE: 78 MMHG | HEIGHT: 64 IN

## 2018-10-08 DIAGNOSIS — K35.80 ACUTE APPENDICITIS, UNSPECIFIED ACUTE APPENDICITIS TYPE: Primary | ICD-10-CM

## 2018-10-08 DIAGNOSIS — Z09 POSTOP CHECK: ICD-10-CM

## 2018-10-08 PROCEDURE — 99024 POSTOP FOLLOW-UP VISIT: CPT | Performed by: SURGERY

## 2018-10-08 NOTE — PROGRESS NOTES
"Subjective   Jaquan Carlson is a 48 y.o. female here today for post op.    History of Present Illness  Ms. Carlson was seen in the office today for her first postoperative visit following her laparoscopic appendectomy for perforated appendicitis with necrosis.  The patient states she is still sore but otherwise is doing well.  She is still on oral antibiotics.  Allergies   Allergen Reactions   • Penicillins          Current Outpatient Prescriptions   Medication Sig Dispense Refill   • ibuprofen (ADVIL,MOTRIN) 600 MG tablet Take 1 tablet by mouth Every 8 (Eight) Hours As Needed for Moderate Pain . 20 tablet 0   • levoFLOXacin (LEVAQUIN) 500 MG tablet Take 1 tablet by mouth Daily for 10 days. 10 tablet 0   • Multiple Vitamins-Minerals (MULTIVITAMIN WITH MINERALS) tablet tablet Take 1 tablet by mouth Daily.     • ondansetron ODT (ZOFRAN-ODT) 4 MG disintegrating tablet Take 1 tablet by mouth Every 8 (Eight) Hours As Needed for Nausea or Vomiting. 15 tablet 0   • oxyCODONE-acetaminophen (PERCOCET) 7.5-325 MG per tablet Take 1 tablet by mouth Every 6 (Six) Hours As Needed for Moderate Pain . 25 tablet 0     No current facility-administered medications for this visit.        Objective   /78 (BP Location: Left arm)   Pulse 95   Ht 162.6 cm (64\")   Wt 84.4 kg (186 lb)   BMI 31.93 kg/m²    Physical Exam  This is a well-developed well-nourished female in no acute distress  HEENT examination: Sclera are anicteric  Abdomen: Bowel sounds are present.  Expected right-sided tenderness.  Skin/incisions: Incision sites were inspected and demonstrate no drainage or erythema  Results/Data  Pathology report was reviewed and discussed with the patient    Procedures     Assessment/Plan   Stable course, status post laparoscopic appendectomy for perforated appendicitis    Follow-up as needed  Patient was advised as to the level of her activity       Discussion/Summary    Errors in dictation may reflect use of voice " recognition software and not all errors in transcription may have been detected prior to signing.    No future appointments.

## 2018-10-19 ENCOUNTER — OFFICE VISIT (OUTPATIENT)
Dept: SURGERY | Facility: CLINIC | Age: 48
End: 2018-10-19

## 2018-10-19 VITALS
HEART RATE: 76 BPM | HEIGHT: 64 IN | BODY MASS INDEX: 31 KG/M2 | DIASTOLIC BLOOD PRESSURE: 82 MMHG | WEIGHT: 181.6 LBS | SYSTOLIC BLOOD PRESSURE: 164 MMHG

## 2018-10-19 DIAGNOSIS — K35.80 ACUTE APPENDICITIS, UNSPECIFIED ACUTE APPENDICITIS TYPE: Primary | ICD-10-CM

## 2018-10-19 PROCEDURE — 99024 POSTOP FOLLOW-UP VISIT: CPT | Performed by: SURGERY

## 2018-10-19 NOTE — PROGRESS NOTES
"Subjective   Jaquan RUI Carlson is a 48 y.o. female here today because of drainage.    History of Present Illness  Ms. Carlson was seen in the office today for an area of drainage from her umbilicus.  She denies fever or chills.  No abdominal pain.  She is status post a laparoscopic appendectomy for perforated appendicitis.  Allergies   Allergen Reactions   • Penicillins          Current Outpatient Prescriptions   Medication Sig Dispense Refill   • ibuprofen (ADVIL,MOTRIN) 600 MG tablet Take 1 tablet by mouth Every 8 (Eight) Hours As Needed for Moderate Pain . 20 tablet 0   • Multiple Vitamins-Minerals (MULTIVITAMIN WITH MINERALS) tablet tablet Take 1 tablet by mouth Daily.     • ondansetron ODT (ZOFRAN-ODT) 4 MG disintegrating tablet Take 1 tablet by mouth Every 8 (Eight) Hours As Needed for Nausea or Vomiting. 15 tablet 0   • oxyCODONE-acetaminophen (PERCOCET) 7.5-325 MG per tablet Take 1 tablet by mouth 4 (Four) Times a Day As Needed. 20 tablet 0     No current facility-administered medications for this visit.      Objective   Ht 162.6 cm (64\")   Wt 82.4 kg (181 lb 9.6 oz)   BMI 31.17 kg/m²    Physical Exam  This is a well-developed well-nourished female in no acute distress  HEENT examination: Sclera are anicteric  Abdomen: Bowel sounds are present.  The abdomen is soft, nontender, nondistended  Skin/incisions: Incision sites were inspected.  There is a superficial separation of the incision at the umbilicus measuring approximately 1 cm x 2 mm.  The base is granulating well.  There is no cellulitis.  There is no purulent drainage.  Results/Data      Procedures     Assessment/Plan   Superficial wound separation without evidence of infection or cellulitis    Local wound care until healed       Discussion/Summary    Errors in dictation may reflect use of voice recognition software and not all errors in transcription may have been detected prior to signing.    No future appointments.    "

## 2019-01-07 ENCOUNTER — OFFICE VISIT (OUTPATIENT)
Dept: SURGERY | Facility: CLINIC | Age: 49
End: 2019-01-07

## 2019-01-07 VITALS
BODY MASS INDEX: 30.73 KG/M2 | SYSTOLIC BLOOD PRESSURE: 140 MMHG | OXYGEN SATURATION: 99 % | HEIGHT: 64 IN | HEART RATE: 89 BPM | DIASTOLIC BLOOD PRESSURE: 90 MMHG | WEIGHT: 180 LBS

## 2019-01-07 DIAGNOSIS — K42.9 UMBILICAL HERNIA WITHOUT OBSTRUCTION AND WITHOUT GANGRENE: Primary | ICD-10-CM

## 2019-01-07 PROBLEM — K35.80 ACUTE APPENDICITIS: Status: RESOLVED | Noted: 2018-10-02 | Resolved: 2019-01-07

## 2019-01-07 PROCEDURE — 99214 OFFICE O/P EST MOD 30 MIN: CPT | Performed by: SURGERY

## 2019-01-07 NOTE — PROGRESS NOTES
Subjective   Jaqaun Carlson is a 48 y.o. female.     History of Present Illness She had a laparosopic appendectomy last year and had some drainage at her umbilical port. She has had a bulge come up just above the umbilicus the last couple of weeks. She had a little diarrhea the last week that she thinks may be her nerves.     The following portions of the patient's history were reviewed and updated as appropriate: current medications, past family history, past medical history, past social history, past surgical history and problem list.    Review of Systems   Constitutional: Negative for activity change, appetite change, chills, fever and unexpected weight change.   HENT: Negative for congestion, facial swelling and sore throat.    Eyes: Negative for photophobia and visual disturbance.   Respiratory: Negative for chest tightness, shortness of breath and wheezing.    Cardiovascular: Negative for chest pain, palpitations and leg swelling.   Gastrointestinal: Positive for abdominal pain and diarrhea. Negative for abdominal distention, anal bleeding, blood in stool, constipation, nausea, rectal pain and vomiting.   Endocrine: Negative for cold intolerance, heat intolerance, polydipsia and polyuria.   Genitourinary: Negative for difficulty urinating, dysuria, flank pain and urgency.   Musculoskeletal: Negative for back pain and myalgias.   Skin: Negative for rash and wound.   Allergic/Immunologic: Negative for immunocompromised state.   Neurological: Negative for dizziness, seizures, syncope, light-headedness, numbness and headaches.   Hematological: Negative for adenopathy. Does not bruise/bleed easily.   Psychiatric/Behavioral: Negative for behavioral problems and confusion. The patient is not nervous/anxious.        Objective   Physical Exam   Constitutional: She is oriented to person, place, and time. She appears well-developed and well-nourished. She does not appear ill. No distress.       HENT:   Head:  Normocephalic. Head is without laceration. Hair is normal.   Right Ear: Hearing and ear canal normal.   Left Ear: Hearing and ear canal normal.   Nose: Nose normal. No sinus tenderness. No epistaxis. Right sinus exhibits no maxillary sinus tenderness and no frontal sinus tenderness. Left sinus exhibits no maxillary sinus tenderness and no frontal sinus tenderness.   Eyes: Conjunctivae and lids are normal. Pupils are equal, round, and reactive to light.   Neck: Normal range of motion. No JVD present. No tracheal tenderness present. No tracheal deviation present. No thyroid mass and no thyromegaly present.   Cardiovascular: Normal rate and regular rhythm. Exam reveals no gallop.   No murmur heard.  Pulmonary/Chest: Effort normal and breath sounds normal. No stridor. She has no wheezes. She exhibits no tenderness.   Abdominal: Soft. Bowel sounds are normal. She exhibits no distension, no ascites and no mass. There is tenderness. There is no rebound and no guarding. A hernia is present.   Musculoskeletal: She exhibits no edema or deformity.   Lymphadenopathy:     She has no cervical adenopathy.     She has no axillary adenopathy.        Right: No inguinal and no supraclavicular adenopathy present.        Left: No inguinal and no supraclavicular adenopathy present.   Neurological: She is alert and oriented to person, place, and time. She exhibits normal muscle tone.   Skin: Skin is warm, dry and intact. No rash noted. No erythema. No pallor.   Psychiatric: She has a normal mood and affect. Her behavior is normal. Thought content normal.   Vitals reviewed.      Assessment/Plan   Jaquan was seen today for hernia.    Diagnoses and all orders for this visit:    Umbilical hernia without obstruction and without gangrene    Repair hernia

## 2019-01-07 NOTE — H&P (VIEW-ONLY)
Subjective   Jaquan Carlson is a 48 y.o. female.     History of Present Illness She had a laparosopic appendectomy last year and had some drainage at her umbilical port. She has had a bulge come up just above the umbilicus the last couple of weeks. She had a little diarrhea the last week that she thinks may be her nerves.     The following portions of the patient's history were reviewed and updated as appropriate: current medications, past family history, past medical history, past social history, past surgical history and problem list.    Review of Systems   Constitutional: Negative for activity change, appetite change, chills, fever and unexpected weight change.   HENT: Negative for congestion, facial swelling and sore throat.    Eyes: Negative for photophobia and visual disturbance.   Respiratory: Negative for chest tightness, shortness of breath and wheezing.    Cardiovascular: Negative for chest pain, palpitations and leg swelling.   Gastrointestinal: Positive for abdominal pain and diarrhea. Negative for abdominal distention, anal bleeding, blood in stool, constipation, nausea, rectal pain and vomiting.   Endocrine: Negative for cold intolerance, heat intolerance, polydipsia and polyuria.   Genitourinary: Negative for difficulty urinating, dysuria, flank pain and urgency.   Musculoskeletal: Negative for back pain and myalgias.   Skin: Negative for rash and wound.   Allergic/Immunologic: Negative for immunocompromised state.   Neurological: Negative for dizziness, seizures, syncope, light-headedness, numbness and headaches.   Hematological: Negative for adenopathy. Does not bruise/bleed easily.   Psychiatric/Behavioral: Negative for behavioral problems and confusion. The patient is not nervous/anxious.        Objective   Physical Exam   Constitutional: She is oriented to person, place, and time. She appears well-developed and well-nourished. She does not appear ill. No distress.       HENT:   Head:  Normocephalic. Head is without laceration. Hair is normal.   Right Ear: Hearing and ear canal normal.   Left Ear: Hearing and ear canal normal.   Nose: Nose normal. No sinus tenderness. No epistaxis. Right sinus exhibits no maxillary sinus tenderness and no frontal sinus tenderness. Left sinus exhibits no maxillary sinus tenderness and no frontal sinus tenderness.   Eyes: Conjunctivae and lids are normal. Pupils are equal, round, and reactive to light.   Neck: Normal range of motion. No JVD present. No tracheal tenderness present. No tracheal deviation present. No thyroid mass and no thyromegaly present.   Cardiovascular: Normal rate and regular rhythm. Exam reveals no gallop.   No murmur heard.  Pulmonary/Chest: Effort normal and breath sounds normal. No stridor. She has no wheezes. She exhibits no tenderness.   Abdominal: Soft. Bowel sounds are normal. She exhibits no distension, no ascites and no mass. There is tenderness. There is no rebound and no guarding. A hernia is present.   Musculoskeletal: She exhibits no edema or deformity.   Lymphadenopathy:     She has no cervical adenopathy.     She has no axillary adenopathy.        Right: No inguinal and no supraclavicular adenopathy present.        Left: No inguinal and no supraclavicular adenopathy present.   Neurological: She is alert and oriented to person, place, and time. She exhibits normal muscle tone.   Skin: Skin is warm, dry and intact. No rash noted. No erythema. No pallor.   Psychiatric: She has a normal mood and affect. Her behavior is normal. Thought content normal.   Vitals reviewed.      Assessment/Plan   Jaquan was seen today for hernia.    Diagnoses and all orders for this visit:    Umbilical hernia without obstruction and without gangrene    Repair hernia

## 2019-01-09 ENCOUNTER — APPOINTMENT (OUTPATIENT)
Dept: PREADMISSION TESTING | Facility: HOSPITAL | Age: 49
End: 2019-01-09

## 2019-01-09 LAB
DEPRECATED RDW RBC AUTO: 45.6 FL (ref 37–54)
ERYTHROCYTE [DISTWIDTH] IN BLOOD BY AUTOMATED COUNT: 14.4 % (ref 11.5–14.5)
HCT VFR BLD AUTO: 43.9 % (ref 37–47)
HGB BLD-MCNC: 14.8 G/DL (ref 12–16)
MCH RBC QN AUTO: 29.7 PG (ref 27–33)
MCHC RBC AUTO-ENTMCNC: 33.7 G/DL (ref 33–37)
MCV RBC AUTO: 88.2 FL (ref 80–94)
PLATELET # BLD AUTO: 370 10*3/MM3 (ref 130–400)
PMV BLD AUTO: 10.2 FL (ref 6–10)
RBC # BLD AUTO: 4.98 10*6/MM3 (ref 4.2–5.4)
WBC NRBC COR # BLD: 6.53 10*3/MM3 (ref 4.5–12.5)

## 2019-01-09 PROCEDURE — 85027 COMPLETE CBC AUTOMATED: CPT | Performed by: ANESTHESIOLOGY

## 2019-01-09 PROCEDURE — 36415 COLL VENOUS BLD VENIPUNCTURE: CPT

## 2019-01-09 NOTE — DISCHARGE INSTRUCTIONS
Procedure Date 1/11/19, Arrival Time 7:00AM    TAKE the following medications the morning of surgery:  All heart or blood pressure medications    HOLD all diabetic medications the morning of surgery as ordered by physician.    Please discontinue all blood thinners and anticoagulants (except aspirin) prior to surgery as per your surgeon and cardiologist instructions.  Aspirin may be continued up to the day prior to surgery.     CHLORHEXIDINE CLOTHS GIVEN WITH INSTRUCTIONS AND FORM TO RETURN TO HOSPITAL    General Instructions:  · Do not eat or drink after midnight 1/10/19: includes water, mints, or gum. You may brush your teeth.    · Dental appliances that are removable must be taken out day of surgery.  · Do not smoke, chew tobacco, or drink alcohol.  · Bring medications in original bottles, any inhalers and if applicable your C-PAP/BI-PAP machine.  · Bring any papers given to you in the doctor's office.  · Wear clean comfortable clothes and socks.  · Do not wear contact lenses or make-up. Bring a case for your glasses if applicable.  · Bring crutches or walker if applicable.  · Leave all other valuables and jewelry at home.    If you were given a blood bank ID arm band remember to bring it with you the day of surgery.    Preventing a Surgical Site Infection:  Shower the night before surgery (unless instructed other wise) using a fresh bar of anti-bacterial soap (such as Dial) and clean washcloth. Dry with a clean towel and dress in clean clothing.  For 2 to 3 days before surgery, avoid shaving with a razor near where you will have surgery because the razor can irritate skin and make it easier to develop an infection. Ask your surgeon if you will be receiving antibiotics prior to surgery.  Make sure you, your family, and all healthcare providers clear their hands with soap and water or an alcohol-based hand  before caring for you or your wound.  If at all possible, quit smoking as many days before surgery  as you can.    Day of surgery:  Upon arrival, a Pre-op nurse and Anesthesiologist will review your health history, obtain vital signs, and answer questions you may have. The only belongings needed at this time will be your home medications and if applicable your C-PAP/BI-PAP machine. If you are staying overnight your family can leave the rest of your belongings in the car and bring them to your room later. A Pre-op nurse will start an IV and you may receive medication in preparation for surgery, including something to help you relax. Your family will be able to see you in the Pre-op area. While you are in surgery your family should notify the waiting room  if they leave the waiting room area and provide a contact phone number.    Please be aware that surgery does come with discomfort. We want to make every effort to control your discomfort so please discuss any uncontrolled symptoms with your nurse. Your doctor will most likely have prescribed pain medications.    If you are going home after surgery you will receive individualized written care instructions before being discharged. A responsible adult must drive you to and from the hospital on the day of surgery and stay with you for 24 hours.    If you are staying overnight following surgery, you will be transported to your hospital room following the recovery period.  [facility] has all private rooms.    If you have any questions please call Pre-Admission Testing at [phone].  Deductibles and co-payments are collected on the day of service. Please be prepared to pay the required co-pay, deductible or deposit on the day of service as defined by your plan.

## 2019-01-11 ENCOUNTER — ANESTHESIA (OUTPATIENT)
Dept: PERIOP | Facility: HOSPITAL | Age: 49
End: 2019-01-11

## 2019-01-11 ENCOUNTER — ANESTHESIA EVENT (OUTPATIENT)
Dept: PERIOP | Facility: HOSPITAL | Age: 49
End: 2019-01-11

## 2019-01-11 ENCOUNTER — HOSPITAL ENCOUNTER (OUTPATIENT)
Facility: HOSPITAL | Age: 49
Setting detail: HOSPITAL OUTPATIENT SURGERY
Discharge: HOME | End: 2019-01-11
Attending: SURGERY | Admitting: ANESTHESIOLOGY

## 2019-01-11 VITALS
RESPIRATION RATE: 16 BRPM | TEMPERATURE: 97.8 F | BODY MASS INDEX: 30.39 KG/M2 | DIASTOLIC BLOOD PRESSURE: 81 MMHG | WEIGHT: 178 LBS | SYSTOLIC BLOOD PRESSURE: 147 MMHG | OXYGEN SATURATION: 100 % | HEIGHT: 64 IN | HEART RATE: 91 BPM

## 2019-01-11 LAB
B-HCG UR QL: NEGATIVE
INTERNAL NEGATIVE CONTROL: NEGATIVE
INTERNAL POSITIVE CONTROL: POSITIVE
Lab: NORMAL

## 2019-01-11 PROCEDURE — 49585 PR REPAIR UMBILICAL HERN,5+Y/O,REDUC: CPT | Performed by: SURGERY

## 2019-01-11 PROCEDURE — 25010000002 DEXAMETHASONE PER 1 MG: Performed by: NURSE ANESTHETIST, CERTIFIED REGISTERED

## 2019-01-11 PROCEDURE — 25010000002 FENTANYL CITRATE (PF) 100 MCG/2ML SOLUTION: Performed by: NURSE ANESTHETIST, CERTIFIED REGISTERED

## 2019-01-11 PROCEDURE — C1781 MESH (IMPLANTABLE): HCPCS | Performed by: SURGERY

## 2019-01-11 PROCEDURE — 81025 URINE PREGNANCY TEST: CPT | Performed by: ANESTHESIOLOGY

## 2019-01-11 PROCEDURE — 25010000002 KETOROLAC TROMETHAMINE PER 15 MG: Performed by: NURSE ANESTHETIST, CERTIFIED REGISTERED

## 2019-01-11 PROCEDURE — 25010000002 NEOSTIGMINE 10 MG/10ML SOLUTION: Performed by: NURSE ANESTHETIST, CERTIFIED REGISTERED

## 2019-01-11 PROCEDURE — 25010000002 ONDANSETRON PER 1 MG: Performed by: NURSE ANESTHETIST, CERTIFIED REGISTERED

## 2019-01-11 PROCEDURE — 25010000002 MIDAZOLAM PER 1 MG: Performed by: NURSE ANESTHETIST, CERTIFIED REGISTERED

## 2019-01-11 PROCEDURE — 25010000002 PROPOFOL 10 MG/ML EMULSION: Performed by: NURSE ANESTHETIST, CERTIFIED REGISTERED

## 2019-01-11 DEVICE — VENTRALEX ST HERNIA PATCH
Type: IMPLANTABLE DEVICE | Site: UMBILICAL | Status: FUNCTIONAL
Brand: VENTRALEX ST HERNIA PATCH

## 2019-01-11 RX ORDER — THIAMINE HCL 100 MG
1 TABLET ORAL DAILY
COMMUNITY
End: 2021-10-07

## 2019-01-11 RX ORDER — MAGNESIUM HYDROXIDE 1200 MG/15ML
LIQUID ORAL AS NEEDED
Status: DISCONTINUED | OUTPATIENT
Start: 2019-01-11 | End: 2019-01-11 | Stop reason: HOSPADM

## 2019-01-11 RX ORDER — ONDANSETRON 2 MG/ML
4 INJECTION INTRAMUSCULAR; INTRAVENOUS ONCE AS NEEDED
Status: DISCONTINUED | OUTPATIENT
Start: 2019-01-11 | End: 2019-01-11 | Stop reason: HOSPADM

## 2019-01-11 RX ORDER — SODIUM CHLORIDE, SODIUM LACTATE, POTASSIUM CHLORIDE, CALCIUM CHLORIDE 600; 310; 30; 20 MG/100ML; MG/100ML; MG/100ML; MG/100ML
125 INJECTION, SOLUTION INTRAVENOUS CONTINUOUS
Status: DISCONTINUED | OUTPATIENT
Start: 2019-01-11 | End: 2019-01-11 | Stop reason: SDUPTHER

## 2019-01-11 RX ORDER — DEXAMETHASONE SODIUM PHOSPHATE 4 MG/ML
INJECTION, SOLUTION INTRA-ARTICULAR; INTRALESIONAL; INTRAMUSCULAR; INTRAVENOUS; SOFT TISSUE AS NEEDED
Status: DISCONTINUED | OUTPATIENT
Start: 2019-01-11 | End: 2019-01-11 | Stop reason: SURG

## 2019-01-11 RX ORDER — MIDAZOLAM HYDROCHLORIDE 1 MG/ML
INJECTION INTRAMUSCULAR; INTRAVENOUS AS NEEDED
Status: DISCONTINUED | OUTPATIENT
Start: 2019-01-11 | End: 2019-01-11 | Stop reason: SURG

## 2019-01-11 RX ORDER — ROCURONIUM BROMIDE 10 MG/ML
INJECTION, SOLUTION INTRAVENOUS AS NEEDED
Status: DISCONTINUED | OUTPATIENT
Start: 2019-01-11 | End: 2019-01-11 | Stop reason: SURG

## 2019-01-11 RX ORDER — PROPOFOL 10 MG/ML
VIAL (ML) INTRAVENOUS AS NEEDED
Status: DISCONTINUED | OUTPATIENT
Start: 2019-01-11 | End: 2019-01-11 | Stop reason: SURG

## 2019-01-11 RX ORDER — MEPERIDINE HYDROCHLORIDE 25 MG/ML
12.5 INJECTION INTRAMUSCULAR; INTRAVENOUS; SUBCUTANEOUS
Status: DISCONTINUED | OUTPATIENT
Start: 2019-01-11 | End: 2019-01-11 | Stop reason: HOSPADM

## 2019-01-11 RX ORDER — FENTANYL CITRATE 50 UG/ML
50 INJECTION, SOLUTION INTRAMUSCULAR; INTRAVENOUS
Status: DISCONTINUED | OUTPATIENT
Start: 2019-01-11 | End: 2019-01-11 | Stop reason: HOSPADM

## 2019-01-11 RX ORDER — KETOROLAC TROMETHAMINE 30 MG/ML
INJECTION, SOLUTION INTRAMUSCULAR; INTRAVENOUS AS NEEDED
Status: DISCONTINUED | OUTPATIENT
Start: 2019-01-11 | End: 2019-01-11 | Stop reason: SURG

## 2019-01-11 RX ORDER — MIDAZOLAM HYDROCHLORIDE 1 MG/ML
1 INJECTION INTRAMUSCULAR; INTRAVENOUS
Status: DISCONTINUED | OUTPATIENT
Start: 2019-01-11 | End: 2019-01-11 | Stop reason: HOSPADM

## 2019-01-11 RX ORDER — SCOLOPAMINE TRANSDERMAL SYSTEM 1 MG/1
1 PATCH, EXTENDED RELEASE TRANSDERMAL CONTINUOUS
Status: DISCONTINUED | OUTPATIENT
Start: 2019-01-11 | End: 2019-01-11 | Stop reason: HOSPADM

## 2019-01-11 RX ORDER — SODIUM CHLORIDE, SODIUM LACTATE, POTASSIUM CHLORIDE, CALCIUM CHLORIDE 600; 310; 30; 20 MG/100ML; MG/100ML; MG/100ML; MG/100ML
125 INJECTION, SOLUTION INTRAVENOUS CONTINUOUS
Status: DISCONTINUED | OUTPATIENT
Start: 2019-01-11 | End: 2019-01-11 | Stop reason: HOSPADM

## 2019-01-11 RX ORDER — GLYCOPYRROLATE 0.2 MG/ML
INJECTION INTRAMUSCULAR; INTRAVENOUS AS NEEDED
Status: DISCONTINUED | OUTPATIENT
Start: 2019-01-11 | End: 2019-01-11 | Stop reason: SURG

## 2019-01-11 RX ORDER — LIDOCAINE HYDROCHLORIDE 20 MG/ML
INJECTION, SOLUTION INFILTRATION; PERINEURAL AS NEEDED
Status: DISCONTINUED | OUTPATIENT
Start: 2019-01-11 | End: 2019-01-11 | Stop reason: SURG

## 2019-01-11 RX ORDER — FENTANYL CITRATE 50 UG/ML
INJECTION, SOLUTION INTRAMUSCULAR; INTRAVENOUS AS NEEDED
Status: DISCONTINUED | OUTPATIENT
Start: 2019-01-11 | End: 2019-01-11 | Stop reason: SURG

## 2019-01-11 RX ORDER — IPRATROPIUM BROMIDE AND ALBUTEROL SULFATE 2.5; .5 MG/3ML; MG/3ML
3 SOLUTION RESPIRATORY (INHALATION) ONCE AS NEEDED
Status: DISCONTINUED | OUTPATIENT
Start: 2019-01-11 | End: 2019-01-11 | Stop reason: HOSPADM

## 2019-01-11 RX ORDER — FAMOTIDINE 10 MG/ML
INJECTION, SOLUTION INTRAVENOUS AS NEEDED
Status: DISCONTINUED | OUTPATIENT
Start: 2019-01-11 | End: 2019-01-11 | Stop reason: SURG

## 2019-01-11 RX ORDER — BUPIVACAINE HYDROCHLORIDE AND EPINEPHRINE 5; 5 MG/ML; UG/ML
INJECTION, SOLUTION EPIDURAL; INTRACAUDAL; PERINEURAL AS NEEDED
Status: DISCONTINUED | OUTPATIENT
Start: 2019-01-11 | End: 2019-01-11 | Stop reason: HOSPADM

## 2019-01-11 RX ORDER — OXYCODONE HYDROCHLORIDE AND ACETAMINOPHEN 5; 325 MG/1; MG/1
1 TABLET ORAL ONCE AS NEEDED
Status: DISCONTINUED | OUTPATIENT
Start: 2019-01-11 | End: 2019-01-11 | Stop reason: HOSPADM

## 2019-01-11 RX ORDER — HYDROCODONE BITARTRATE AND ACETAMINOPHEN 5; 325 MG/1; MG/1
TABLET ORAL
Qty: 30 TABLET | Refills: 0 | Status: SHIPPED | OUTPATIENT
Start: 2019-01-11 | End: 2021-10-07

## 2019-01-11 RX ORDER — SODIUM CHLORIDE 0.9 % (FLUSH) 0.9 %
3-10 SYRINGE (ML) INJECTION AS NEEDED
Status: DISCONTINUED | OUTPATIENT
Start: 2019-01-11 | End: 2019-01-11 | Stop reason: HOSPADM

## 2019-01-11 RX ORDER — CHOLECALCIFEROL (VITAMIN D3) 125 MCG
500 CAPSULE ORAL DAILY
COMMUNITY
End: 2021-10-07

## 2019-01-11 RX ORDER — ONDANSETRON 2 MG/ML
INJECTION INTRAMUSCULAR; INTRAVENOUS AS NEEDED
Status: DISCONTINUED | OUTPATIENT
Start: 2019-01-11 | End: 2019-01-11 | Stop reason: SURG

## 2019-01-11 RX ORDER — SODIUM CHLORIDE 0.9 % (FLUSH) 0.9 %
3 SYRINGE (ML) INJECTION EVERY 12 HOURS SCHEDULED
Status: DISCONTINUED | OUTPATIENT
Start: 2019-01-11 | End: 2019-01-11 | Stop reason: HOSPADM

## 2019-01-11 RX ORDER — MIDAZOLAM HYDROCHLORIDE 1 MG/ML
2 INJECTION INTRAMUSCULAR; INTRAVENOUS
Status: DISCONTINUED | OUTPATIENT
Start: 2019-01-11 | End: 2019-01-11 | Stop reason: HOSPADM

## 2019-01-11 RX ORDER — NEOSTIGMINE METHYLSULFATE 1 MG/ML
INJECTION, SOLUTION INTRAVENOUS AS NEEDED
Status: DISCONTINUED | OUTPATIENT
Start: 2019-01-11 | End: 2019-01-11 | Stop reason: SURG

## 2019-01-11 RX ADMIN — KETOROLAC TROMETHAMINE 30 MG: 30 INJECTION, SOLUTION INTRAMUSCULAR; INTRAVENOUS at 08:55

## 2019-01-11 RX ADMIN — ONDANSETRON 4 MG: 2 INJECTION, SOLUTION INTRAMUSCULAR; INTRAVENOUS at 08:11

## 2019-01-11 RX ADMIN — FAMOTIDINE 20 MG: 10 INJECTION, SOLUTION INTRAVENOUS at 08:11

## 2019-01-11 RX ADMIN — LIDOCAINE HYDROCHLORIDE 60 MG: 20 INJECTION, SOLUTION INFILTRATION; PERINEURAL at 08:11

## 2019-01-11 RX ADMIN — PROPOFOL 160 MG: 10 INJECTION, EMULSION INTRAVENOUS at 08:16

## 2019-01-11 RX ADMIN — FENTANYL CITRATE 50 MCG: 50 INJECTION INTRAMUSCULAR; INTRAVENOUS at 08:21

## 2019-01-11 RX ADMIN — FENTANYL CITRATE 50 MCG: 50 INJECTION INTRAMUSCULAR; INTRAVENOUS at 08:55

## 2019-01-11 RX ADMIN — FENTANYL CITRATE 50 MCG: 50 INJECTION INTRAMUSCULAR; INTRAVENOUS at 09:20

## 2019-01-11 RX ADMIN — NEOSTIGMINE METHYLSULFATE 3 MG: 1 INJECTION, SOLUTION INTRAVENOUS at 08:45

## 2019-01-11 RX ADMIN — SODIUM CHLORIDE, POTASSIUM CHLORIDE, SODIUM LACTATE AND CALCIUM CHLORIDE 125 ML/HR: 600; 310; 30; 20 INJECTION, SOLUTION INTRAVENOUS at 07:48

## 2019-01-11 RX ADMIN — FENTANYL CITRATE 100 MCG: 50 INJECTION INTRAMUSCULAR; INTRAVENOUS at 08:11

## 2019-01-11 RX ADMIN — ROCURONIUM BROMIDE 25 MG: 10 INJECTION INTRAVENOUS at 08:16

## 2019-01-11 RX ADMIN — GLYCOPYRROLATE 0.4 MG: 0.2 INJECTION, SOLUTION INTRAMUSCULAR; INTRAVENOUS at 08:45

## 2019-01-11 RX ADMIN — SCOPALAMINE 1 PATCH: 1 PATCH, EXTENDED RELEASE TRANSDERMAL at 07:48

## 2019-01-11 RX ADMIN — FENTANYL CITRATE 50 MCG: 50 INJECTION INTRAMUSCULAR; INTRAVENOUS at 09:12

## 2019-01-11 RX ADMIN — MIDAZOLAM HYDROCHLORIDE 2 MG: 1 INJECTION, SOLUTION INTRAMUSCULAR; INTRAVENOUS at 08:11

## 2019-01-11 RX ADMIN — DEXAMETHASONE SODIUM PHOSPHATE 4 MG: 4 INJECTION, SOLUTION INTRAMUSCULAR; INTRAVENOUS at 08:18

## 2019-01-11 NOTE — OP NOTE
UMBILICAL HERNIA REPAIR  Procedure Note    Jaquan Carlson  1/11/2019    Pre-op Diagnosis:   Umbilical hernia without obstruction and without gangrene [K42.9]    Post-op Diagnosis: same        Procedure(s):  UMBILICAL HERNIA REPAIR    Surgeon(s):  Austin Laurent MD    Anesthesia: General    Staff:   Circulator: Brandee Vora RN  Scrub Person: Marisol Jung  Assistant: Austin Valdez    Estimated Blood Loss: 100ml    Specimens:                * No orders in the log *      Drains:      Procedure: The abdomen was prepped and draped. The old scar above the umbilical area was incised and the hernia sack dissected out with cautery. The fascia edges were cleared off and a medium round Bard patch placed. It was sutured to the fascia while closing it with 2-0 vicryl. Local was injected and the subcutaneous tissue and skin was closed with vicryl.     Findings: umbilical hernia    Complications: none   Grafts / Implants N/A    Austin Laurent MD     Date: 1/11/2019  Time: 9:00 AM

## 2019-01-11 NOTE — ANESTHESIA POSTPROCEDURE EVALUATION
Patient: Jaquan Carlson    Procedure Summary     Date:  01/11/19 Room / Location:  [facility] OR 02 /  [facility] OR    Anesthesia Start:  0811 Anesthesia Stop:  0858    Procedure:  UMBILICAL HERNIA REPAIR (N/A Abdomen) Diagnosis:       Umbilical hernia without obstruction and without gangrene      (Umbilical hernia without obstruction and without gangrene [K42.9])    Surgeon:  Austin Laurent MD Provider:  Alton Gustafson MD    Anesthesia Type:  general ASA Status:  2          Anesthesia Type: general  Last vitals  BP   135/77 (01/11/19 0929)   Temp   97.4 °F (36.3 °C) (01/11/19 0859)   Pulse   89 (01/11/19 0929)   Resp   18 (01/11/19 0929)     SpO2   98 % (01/11/19 0929)     Post Anesthesia Care and Evaluation    Patient location during evaluation: PHASE II  Patient participation: complete - patient participated  Level of consciousness: awake and alert  Pain score: 1  Pain management: adequate  Airway patency: patent  Anesthetic complications: No anesthetic complications  PONV Status: controlled  Cardiovascular status: acceptable  Respiratory status: acceptable  Hydration status: acceptable

## 2019-01-11 NOTE — ANESTHESIA PREPROCEDURE EVALUATION
Anesthesia Evaluation     NPO Solid Status: > 8 hours  NPO Liquid Status: > 8 hours           Airway   Mallampati: I  TM distance: >3 FB  Neck ROM: full  No difficulty expected  Dental - normal exam     Pulmonary - normal exam   (-) not a smoker  Cardiovascular - normal exam        Neuro/Psych  GI/Hepatic/Renal/Endo      Musculoskeletal     Abdominal  - normal exam    Bowel sounds: normal.   Substance History      OB/GYN          Other                          Anesthesia Plan    ASA 2     general     intravenous induction   Anesthetic plan, all risks, benefits, and alternatives have been provided, discussed and informed consent has been obtained with: patient.

## 2019-01-11 NOTE — ANESTHESIA PROCEDURE NOTES
Airway  Urgency: elective    Airway not difficult    General Information and Staff    Patient location during procedure: OR  CRNA: Lyndsey Alcaraz CRNA    Indications and Patient Condition  Indications for airway management: airway protection    Preoxygenated: yes  MILS maintained throughout  Mask difficulty assessment: 1 - vent by mask    Final Airway Details  Final airway type: endotracheal airway      Successful airway: ETT  Cuffed: yes   Successful intubation technique: direct laryngoscopy  Facilitating devices/methods: intubating stylet  Endotracheal tube insertion site: oral  Blade: Harika  Blade size: 3  ETT size (mm): 7.0  Cormack-Lehane Classification: grade I - full view of glottis  Placement verified by: chest auscultation and capnometry   Measured from: lips  ETT to lips (cm): 20  Number of attempts at approach: 1

## 2019-01-21 ENCOUNTER — OFFICE VISIT (OUTPATIENT)
Dept: SURGERY | Facility: CLINIC | Age: 49
End: 2019-01-21

## 2019-01-21 VITALS — HEIGHT: 64 IN | WEIGHT: 178 LBS | BODY MASS INDEX: 30.39 KG/M2

## 2019-01-21 DIAGNOSIS — Z09 STATUS POST UMBILICAL HERNIA REPAIR, FOLLOW-UP EXAM: Primary | ICD-10-CM

## 2019-01-21 PROCEDURE — 99024 POSTOP FOLLOW-UP VISIT: CPT | Performed by: SURGERY

## 2019-01-21 NOTE — PROGRESS NOTES
Subjective   Jaquan Carlson is a 48 y.o. female.     History of Present Illness She is doing well pos umbilical hernia repair.    The following portions of the patient's history were reviewed and updated as appropriate: current medications, past family history, past medical history, past social history, past surgical history and problem list.    Review of Systems    Objective   Physical Exam wounds ok    Assessment/Plan   Jaquan was seen today for post-op follow-up.    Diagnoses and all orders for this visit:    Status post umbilical hernia repair, follow-up exam    return 1 mo

## 2019-02-18 ENCOUNTER — OFFICE VISIT (OUTPATIENT)
Dept: SURGERY | Facility: CLINIC | Age: 49
End: 2019-02-18

## 2019-02-18 VITALS — HEIGHT: 64 IN | BODY MASS INDEX: 30.39 KG/M2 | WEIGHT: 178 LBS

## 2019-02-18 DIAGNOSIS — Z09 STATUS POST UMBILICAL HERNIA REPAIR, FOLLOW-UP EXAM: Primary | ICD-10-CM

## 2019-02-18 PROCEDURE — 99024 POSTOP FOLLOW-UP VISIT: CPT | Performed by: SURGERY

## 2019-02-18 NOTE — PROGRESS NOTES
Subjective   Jaquan Carlson is a 48 y.o. female.     History of Present Illness She is doing well post umbilical hernia repair.    The following portions of the patient's history were reviewed and updated as appropriate: current medications, past family history, past medical history, past social history, past surgical history and problem list.    Review of Systems    Objective   Physical Exam wound is fine, no sign of recurrence, small suture on the left side.     Assessment/Plan   Jaquan was seen today for follow-up.    Diagnoses and all orders for this visit:    Status post umbilical hernia repair, follow-up exam    return prn

## 2019-09-06 ENCOUNTER — LAB REQUISITION (OUTPATIENT)
Dept: LAB | Facility: HOSPITAL | Age: 49
End: 2019-09-06

## 2019-09-06 DIAGNOSIS — Z00.00 ROUTINE GENERAL MEDICAL EXAMINATION AT A HEALTH CARE FACILITY: ICD-10-CM

## 2019-09-06 PROCEDURE — 81001 URINALYSIS AUTO W/SCOPE: CPT | Performed by: PHYSICIAN ASSISTANT

## 2019-09-06 PROCEDURE — 87086 URINE CULTURE/COLONY COUNT: CPT | Performed by: PHYSICIAN ASSISTANT

## 2019-09-06 PROCEDURE — 85025 COMPLETE CBC W/AUTO DIFF WBC: CPT | Performed by: PHYSICIAN ASSISTANT

## 2019-09-06 PROCEDURE — 87077 CULTURE AEROBIC IDENTIFY: CPT | Performed by: PHYSICIAN ASSISTANT

## 2019-09-06 PROCEDURE — 87186 SC STD MICRODIL/AGAR DIL: CPT | Performed by: PHYSICIAN ASSISTANT

## 2019-09-07 LAB
BACTERIA UR QL AUTO: ABNORMAL /HPF
BASOPHILS # BLD AUTO: 0.03 10*3/MM3 (ref 0–0.2)
BASOPHILS NFR BLD AUTO: 0.3 % (ref 0–1.5)
BILIRUB UR QL STRIP: NEGATIVE
CLARITY UR: ABNORMAL
COLOR UR: YELLOW
DEPRECATED RDW RBC AUTO: 46.5 FL (ref 37–54)
EOSINOPHIL # BLD AUTO: 0.01 10*3/MM3 (ref 0–0.4)
EOSINOPHIL NFR BLD AUTO: 0.1 % (ref 0.3–6.2)
ERYTHROCYTE [DISTWIDTH] IN BLOOD BY AUTOMATED COUNT: 13.6 % (ref 12.3–15.4)
GLUCOSE UR STRIP-MCNC: NEGATIVE MG/DL
HCT VFR BLD AUTO: 42.8 % (ref 34–46.6)
HGB BLD-MCNC: 13.2 G/DL (ref 12–15.9)
HGB UR QL STRIP.AUTO: ABNORMAL
HYALINE CASTS UR QL AUTO: ABNORMAL /LPF
IMM GRANULOCYTES # BLD AUTO: 0.03 10*3/MM3 (ref 0–0.05)
IMM GRANULOCYTES NFR BLD AUTO: 0.3 % (ref 0–0.5)
KETONES UR QL STRIP: NEGATIVE
LEUKOCYTE ESTERASE UR QL STRIP.AUTO: ABNORMAL
LYMPHOCYTES # BLD AUTO: 1.71 10*3/MM3 (ref 0.7–3.1)
LYMPHOCYTES NFR BLD AUTO: 17 % (ref 19.6–45.3)
MCH RBC QN AUTO: 28.6 PG (ref 26.6–33)
MCHC RBC AUTO-ENTMCNC: 30.8 G/DL (ref 31.5–35.7)
MCV RBC AUTO: 92.6 FL (ref 79–97)
MONOCYTES # BLD AUTO: 0.7 10*3/MM3 (ref 0.1–0.9)
MONOCYTES NFR BLD AUTO: 7 % (ref 5–12)
NEUTROPHILS # BLD AUTO: 7.56 10*3/MM3 (ref 1.7–7)
NEUTROPHILS NFR BLD AUTO: 75.3 % (ref 42.7–76)
NITRITE UR QL STRIP: NEGATIVE
NRBC BLD AUTO-RTO: 0 /100 WBC (ref 0–0.2)
PH UR STRIP.AUTO: 6 [PH] (ref 5–8)
PLATELET # BLD AUTO: 267 10*3/MM3 (ref 140–450)
PMV BLD AUTO: 10.4 FL (ref 6–12)
PROT UR QL STRIP: ABNORMAL
RBC # BLD AUTO: 4.62 10*6/MM3 (ref 3.77–5.28)
RBC # UR: ABNORMAL /HPF
REF LAB TEST METHOD: ABNORMAL
SP GR UR STRIP: 1.02 (ref 1–1.03)
SQUAMOUS #/AREA URNS HPF: ABNORMAL /HPF
UROBILINOGEN UR QL STRIP: ABNORMAL
WBC NRBC COR # BLD: 10.04 10*3/MM3 (ref 3.4–10.8)
WBC UR QL AUTO: ABNORMAL /HPF

## 2019-09-08 LAB — BACTERIA SPEC AEROBE CULT: ABNORMAL

## 2019-10-03 ENCOUNTER — HOSPITAL ENCOUNTER (OUTPATIENT)
Dept: MAMMOGRAPHY | Facility: HOSPITAL | Age: 49
Discharge: HOME | End: 2019-10-03
Admitting: NURSE PRACTITIONER

## 2019-10-03 DIAGNOSIS — Z12.39 SCREENING BREAST EXAMINATION: ICD-10-CM

## 2019-10-03 PROCEDURE — 77067 SCR MAMMO BI INCL CAD: CPT | Performed by: RADIOLOGY

## 2019-10-03 PROCEDURE — 77063 BREAST TOMOSYNTHESIS BI: CPT

## 2019-10-03 PROCEDURE — 77067 SCR MAMMO BI INCL CAD: CPT

## 2019-10-03 PROCEDURE — 77063 BREAST TOMOSYNTHESIS BI: CPT | Performed by: RADIOLOGY

## 2019-10-31 ENCOUNTER — TRANSCRIBE ORDERS (OUTPATIENT)
Dept: ADMINISTRATIVE | Facility: HOSPITAL | Age: 49
End: 2019-10-31

## 2019-10-31 ENCOUNTER — LAB (OUTPATIENT)
Dept: LAB | Facility: HOSPITAL | Age: 49
End: 2019-10-31

## 2019-10-31 DIAGNOSIS — Z13.220 SCREENING FOR LIPOID DISORDERS: ICD-10-CM

## 2019-10-31 DIAGNOSIS — I10 ESSENTIAL HYPERTENSION, MALIGNANT: ICD-10-CM

## 2019-10-31 DIAGNOSIS — Z13.29 SCREENING FOR THYROID DISORDER: ICD-10-CM

## 2019-10-31 DIAGNOSIS — Z71.3 DIETARY COUNSELING: ICD-10-CM

## 2019-10-31 DIAGNOSIS — E55.9 VITAMIN D DEFICIENCY: ICD-10-CM

## 2019-10-31 DIAGNOSIS — E55.9 AVITAMINOSIS D: Primary | ICD-10-CM

## 2019-10-31 DIAGNOSIS — R53.83 TIREDNESS: ICD-10-CM

## 2019-10-31 LAB
25(OH)D3 SERPL-MCNC: 51.8 NG/ML (ref 30–100)
ALBUMIN SERPL-MCNC: 4.4 G/DL (ref 3.5–5.2)
ALBUMIN/GLOB SERPL: 1.2 G/DL
ALP SERPL-CCNC: 82 U/L (ref 39–117)
ALT SERPL W P-5'-P-CCNC: 28 U/L (ref 1–33)
ANION GAP SERPL CALCULATED.3IONS-SCNC: 11.5 MMOL/L (ref 5–15)
AST SERPL-CCNC: 20 U/L (ref 1–32)
BASOPHILS # BLD AUTO: 0.05 10*3/MM3 (ref 0–0.2)
BASOPHILS NFR BLD AUTO: 0.8 % (ref 0–1.5)
BILIRUB SERPL-MCNC: 0.2 MG/DL (ref 0.2–1.2)
BUN BLD-MCNC: 27 MG/DL (ref 6–20)
BUN/CREAT SERPL: 24.5 (ref 7–25)
CALCIUM SPEC-SCNC: 10 MG/DL (ref 8.6–10.5)
CHLORIDE SERPL-SCNC: 99 MMOL/L (ref 98–107)
CHOLEST SERPL-MCNC: 200 MG/DL (ref 0–200)
CO2 SERPL-SCNC: 26.5 MMOL/L (ref 22–29)
CREAT BLD-MCNC: 1.1 MG/DL (ref 0.57–1)
DEPRECATED RDW RBC AUTO: 41.3 FL (ref 37–54)
EOSINOPHIL # BLD AUTO: 0.1 10*3/MM3 (ref 0–0.4)
EOSINOPHIL NFR BLD AUTO: 1.6 % (ref 0.3–6.2)
ERYTHROCYTE [DISTWIDTH] IN BLOOD BY AUTOMATED COUNT: 13.2 % (ref 12.3–15.4)
FOLATE SERPL-MCNC: >20 NG/ML (ref 4.78–24.2)
GFR SERPL CREATININE-BSD FRML MDRD: 53 ML/MIN/1.73
GLOBULIN UR ELPH-MCNC: 3.8 GM/DL
GLUCOSE BLD-MCNC: 90 MG/DL (ref 65–99)
HCT VFR BLD AUTO: 41.7 % (ref 34–46.6)
HDLC SERPL-MCNC: 71 MG/DL (ref 40–60)
HGB BLD-MCNC: 14.5 G/DL (ref 12–15.9)
IMM GRANULOCYTES # BLD AUTO: 0.02 10*3/MM3 (ref 0–0.05)
IMM GRANULOCYTES NFR BLD AUTO: 0.3 % (ref 0–0.5)
LDLC SERPL CALC-MCNC: 114 MG/DL (ref 0–100)
LDLC/HDLC SERPL: 1.6 {RATIO}
LYMPHOCYTES # BLD AUTO: 2.98 10*3/MM3 (ref 0.7–3.1)
LYMPHOCYTES NFR BLD AUTO: 48 % (ref 19.6–45.3)
MCH RBC QN AUTO: 30.2 PG (ref 26.6–33)
MCHC RBC AUTO-ENTMCNC: 34.8 G/DL (ref 31.5–35.7)
MCV RBC AUTO: 86.9 FL (ref 79–97)
MONOCYTES # BLD AUTO: 0.44 10*3/MM3 (ref 0.1–0.9)
MONOCYTES NFR BLD AUTO: 7.1 % (ref 5–12)
NEUTROPHILS # BLD AUTO: 2.62 10*3/MM3 (ref 1.7–7)
NEUTROPHILS NFR BLD AUTO: 42.2 % (ref 42.7–76)
NRBC BLD AUTO-RTO: 0 /100 WBC (ref 0–0.2)
PLATELET # BLD AUTO: 347 10*3/MM3 (ref 140–450)
PMV BLD AUTO: 10.2 FL (ref 6–12)
POTASSIUM BLD-SCNC: 4.4 MMOL/L (ref 3.5–5.2)
PROT SERPL-MCNC: 8.2 G/DL (ref 6–8.5)
RBC # BLD AUTO: 4.8 10*6/MM3 (ref 3.77–5.28)
SODIUM BLD-SCNC: 137 MMOL/L (ref 136–145)
T3 SERPL-MCNC: 96.9 NG/DL (ref 80–200)
T4 SERPL-MCNC: 6.4 MCG/DL (ref 4.5–11.7)
TRIGL SERPL-MCNC: 76 MG/DL (ref 0–150)
TSH SERPL DL<=0.05 MIU/L-ACNC: 2.7 UIU/ML (ref 0.27–4.2)
VIT B12 BLD-MCNC: >2000 PG/ML (ref 211–946)
VLDLC SERPL-MCNC: 15.2 MG/DL (ref 5–40)
WBC NRBC COR # BLD: 6.21 10*3/MM3 (ref 3.4–10.8)

## 2019-10-31 PROCEDURE — 80053 COMPREHEN METABOLIC PANEL: CPT

## 2019-10-31 PROCEDURE — 36415 COLL VENOUS BLD VENIPUNCTURE: CPT

## 2019-10-31 PROCEDURE — 84436 ASSAY OF TOTAL THYROXINE: CPT

## 2019-10-31 PROCEDURE — 84480 ASSAY TRIIODOTHYRONINE (T3): CPT

## 2019-10-31 PROCEDURE — 82607 VITAMIN B-12: CPT

## 2019-10-31 PROCEDURE — 84443 ASSAY THYROID STIM HORMONE: CPT

## 2019-10-31 PROCEDURE — 85025 COMPLETE CBC W/AUTO DIFF WBC: CPT

## 2019-10-31 PROCEDURE — 80061 LIPID PANEL: CPT

## 2019-10-31 PROCEDURE — 82306 VITAMIN D 25 HYDROXY: CPT

## 2019-10-31 PROCEDURE — 82746 ASSAY OF FOLIC ACID SERUM: CPT

## 2020-09-25 ENCOUNTER — TRANSCRIBE ORDERS (OUTPATIENT)
Dept: ADMINISTRATIVE | Facility: HOSPITAL | Age: 50
End: 2020-09-25

## 2020-09-25 ENCOUNTER — LAB (OUTPATIENT)
Dept: LAB | Facility: HOSPITAL | Age: 50
End: 2020-09-25

## 2020-09-25 DIAGNOSIS — I10 ESSENTIAL HYPERTENSION, MALIGNANT: ICD-10-CM

## 2020-09-25 DIAGNOSIS — Z13.29 ENCOUNTER FOR SCREENING FOR OTHER SUSPECTED ENDOCRINE DISORDER: ICD-10-CM

## 2020-09-25 DIAGNOSIS — E55.9 VITAMIN D DEFICIENCY: Primary | ICD-10-CM

## 2020-09-25 DIAGNOSIS — Z13.220 SCREENING FOR LIPOID DISORDERS: ICD-10-CM

## 2020-09-25 DIAGNOSIS — E55.9 VITAMIN D DEFICIENCY: ICD-10-CM

## 2020-09-25 LAB
25(OH)D3 SERPL-MCNC: 44.8 NG/ML (ref 30–100)
ALBUMIN SERPL-MCNC: 4.7 G/DL (ref 3.5–5.2)
ALBUMIN/GLOB SERPL: 1.3 G/DL
ALP SERPL-CCNC: 95 U/L (ref 39–117)
ALT SERPL W P-5'-P-CCNC: 26 U/L (ref 1–33)
ANION GAP SERPL CALCULATED.3IONS-SCNC: 12.6 MMOL/L (ref 5–15)
AST SERPL-CCNC: 17 U/L (ref 1–32)
BILIRUB SERPL-MCNC: 0.2 MG/DL (ref 0–1.2)
BUN SERPL-MCNC: 17 MG/DL (ref 6–20)
BUN/CREAT SERPL: 12.9 (ref 7–25)
CALCIUM SPEC-SCNC: 10.4 MG/DL (ref 8.6–10.5)
CHLORIDE SERPL-SCNC: 103 MMOL/L (ref 98–107)
CHOLEST SERPL-MCNC: 240 MG/DL (ref 0–200)
CO2 SERPL-SCNC: 24.4 MMOL/L (ref 22–29)
CREAT SERPL-MCNC: 1.32 MG/DL (ref 0.57–1)
GFR SERPL CREATININE-BSD FRML MDRD: 42 ML/MIN/1.73
GLOBULIN UR ELPH-MCNC: 3.7 GM/DL
GLUCOSE SERPL-MCNC: 102 MG/DL (ref 65–99)
HDLC SERPL-MCNC: 59 MG/DL (ref 40–60)
LDLC SERPL CALC-MCNC: 153 MG/DL (ref 0–100)
LDLC/HDLC SERPL: 2.59 {RATIO}
POTASSIUM SERPL-SCNC: 5.1 MMOL/L (ref 3.5–5.2)
PROT SERPL-MCNC: 8.4 G/DL (ref 6–8.5)
SODIUM SERPL-SCNC: 140 MMOL/L (ref 136–145)
T4 FREE SERPL-MCNC: 1.32 NG/DL (ref 0.93–1.7)
TRIGL SERPL-MCNC: 142 MG/DL (ref 0–150)
TSH SERPL DL<=0.05 MIU/L-ACNC: 2.55 UIU/ML (ref 0.27–4.2)
VLDLC SERPL-MCNC: 28.4 MG/DL (ref 5–40)

## 2020-09-25 PROCEDURE — 80053 COMPREHEN METABOLIC PANEL: CPT

## 2020-09-25 PROCEDURE — 80061 LIPID PANEL: CPT

## 2020-09-25 PROCEDURE — 84443 ASSAY THYROID STIM HORMONE: CPT

## 2020-09-25 PROCEDURE — 36415 COLL VENOUS BLD VENIPUNCTURE: CPT

## 2020-09-25 PROCEDURE — 84439 ASSAY OF FREE THYROXINE: CPT

## 2020-09-25 PROCEDURE — 82306 VITAMIN D 25 HYDROXY: CPT

## 2020-10-06 ENCOUNTER — HOSPITAL ENCOUNTER (OUTPATIENT)
Dept: MAMMOGRAPHY | Facility: HOSPITAL | Age: 50
Discharge: HOME | End: 2020-10-06
Admitting: NURSE PRACTITIONER

## 2020-10-06 DIAGNOSIS — Z12.31 VISIT FOR SCREENING MAMMOGRAM: ICD-10-CM

## 2020-10-06 PROCEDURE — 77067 SCR MAMMO BI INCL CAD: CPT

## 2020-10-06 PROCEDURE — 77063 BREAST TOMOSYNTHESIS BI: CPT

## 2020-10-06 PROCEDURE — 77063 BREAST TOMOSYNTHESIS BI: CPT | Performed by: RADIOLOGY

## 2020-10-06 PROCEDURE — 77067 SCR MAMMO BI INCL CAD: CPT | Performed by: RADIOLOGY

## 2020-12-18 ENCOUNTER — IMMUNIZATION (OUTPATIENT)
Dept: VACCINE CLINIC | Facility: HOSPITAL | Age: 50
End: 2020-12-18

## 2020-12-18 PROCEDURE — 0001A: CPT | Performed by: FAMILY MEDICINE

## 2020-12-18 PROCEDURE — 91300 HC SARSCOV02 VAC 30MCG/0.3ML IM: CPT | Performed by: FAMILY MEDICINE

## 2021-01-08 ENCOUNTER — IMMUNIZATION (OUTPATIENT)
Dept: VACCINE CLINIC | Facility: HOSPITAL | Age: 51
End: 2021-01-08

## 2021-01-08 PROCEDURE — 0002A: CPT | Performed by: FAMILY MEDICINE

## 2021-01-08 PROCEDURE — 0001A: CPT | Performed by: FAMILY MEDICINE

## 2021-01-08 PROCEDURE — 91300 HC SARSCOV02 VAC 30MCG/0.3ML IM: CPT | Performed by: FAMILY MEDICINE

## 2021-08-24 ENCOUNTER — TRANSCRIBE ORDERS (OUTPATIENT)
Dept: ADMINISTRATIVE | Facility: HOSPITAL | Age: 51
End: 2021-08-24

## 2021-08-24 ENCOUNTER — LAB (OUTPATIENT)
Dept: LAB | Facility: HOSPITAL | Age: 51
End: 2021-08-24

## 2021-08-24 DIAGNOSIS — Z11.59 SPECIAL SCREENING EXAMINATION FOR UNSPECIFIED VIRAL DISEASE: Primary | ICD-10-CM

## 2021-08-24 DIAGNOSIS — Z11.59 SPECIAL SCREENING EXAMINATION FOR UNSPECIFIED VIRAL DISEASE: ICD-10-CM

## 2021-08-24 LAB — SARS-COV-2 RNA PNL SPEC NAA+PROBE: NOT DETECTED

## 2021-08-24 PROCEDURE — U0004 COV-19 TEST NON-CDC HGH THRU: HCPCS | Performed by: INTERNAL MEDICINE

## 2021-08-24 PROCEDURE — C9803 HOPD COVID-19 SPEC COLLECT: HCPCS | Performed by: INTERNAL MEDICINE

## 2021-10-07 ENCOUNTER — HOSPITAL ENCOUNTER (OUTPATIENT)
Dept: MAMMOGRAPHY | Facility: HOSPITAL | Age: 51
Discharge: HOME | End: 2021-10-07
Admitting: NURSE PRACTITIONER

## 2021-10-07 ENCOUNTER — TELEMEDICINE (OUTPATIENT)
Dept: FAMILY MEDICINE CLINIC | Age: 51
End: 2021-10-07

## 2021-10-07 VITALS
SYSTOLIC BLOOD PRESSURE: 130 MMHG | HEIGHT: 64 IN | DIASTOLIC BLOOD PRESSURE: 70 MMHG | BODY MASS INDEX: 35.85 KG/M2 | WEIGHT: 210 LBS

## 2021-10-07 DIAGNOSIS — E55.9 VITAMIN D DEFICIENCY: Primary | ICD-10-CM

## 2021-10-07 DIAGNOSIS — K21.9 GASTROESOPHAGEAL REFLUX DISEASE, UNSPECIFIED WHETHER ESOPHAGITIS PRESENT: ICD-10-CM

## 2021-10-07 DIAGNOSIS — R63.5 UNINTENDED WEIGHT GAIN: ICD-10-CM

## 2021-10-07 DIAGNOSIS — Z12.31 VISIT FOR SCREENING MAMMOGRAM: ICD-10-CM

## 2021-10-07 DIAGNOSIS — Z76.89 ENCOUNTER TO ESTABLISH CARE: ICD-10-CM

## 2021-10-07 DIAGNOSIS — I10 ESSENTIAL (PRIMARY) HYPERTENSION: ICD-10-CM

## 2021-10-07 PROBLEM — R53.83 FATIGUE: Status: ACTIVE | Noted: 2021-10-07

## 2021-10-07 PROBLEM — R35.0 INCREASED FREQUENCY OF URINATION: Status: ACTIVE | Noted: 2021-10-07

## 2021-10-07 PROBLEM — N39.3 STRESS INCONTINENCE IN FEMALE: Status: ACTIVE | Noted: 2021-10-07

## 2021-10-07 PROCEDURE — 77067 SCR MAMMO BI INCL CAD: CPT

## 2021-10-07 PROCEDURE — 77063 BREAST TOMOSYNTHESIS BI: CPT

## 2021-10-07 PROCEDURE — 77067 SCR MAMMO BI INCL CAD: CPT | Performed by: RADIOLOGY

## 2021-10-07 PROCEDURE — 99214 OFFICE O/P EST MOD 30 MIN: CPT | Performed by: NURSE PRACTITIONER

## 2021-10-07 PROCEDURE — 77063 BREAST TOMOSYNTHESIS BI: CPT | Performed by: RADIOLOGY

## 2021-10-07 RX ORDER — LISINOPRIL 10 MG/1
10 TABLET ORAL DAILY
Qty: 30 TABLET | Refills: 0 | Status: SHIPPED | OUTPATIENT
Start: 2021-10-07 | End: 2021-11-10 | Stop reason: SDUPTHER

## 2021-10-07 RX ORDER — PHENTERMINE HYDROCHLORIDE 37.5 MG/1
37.5 CAPSULE ORAL EVERY MORNING
Qty: 30 CAPSULE | Refills: 0 | Status: SHIPPED | OUTPATIENT
Start: 2021-10-07 | End: 2021-11-04 | Stop reason: SDUPTHER

## 2021-10-07 RX ORDER — ERGOCALCIFEROL 1.25 MG/1
CAPSULE ORAL
Qty: 4 CAPSULE | Refills: 3 | Status: SHIPPED | OUTPATIENT
Start: 2021-10-07 | End: 2022-11-29 | Stop reason: SDUPTHER

## 2021-10-07 RX ORDER — LISINOPRIL 10 MG/1
TABLET ORAL
COMMUNITY
Start: 2021-09-10 | End: 2021-10-07 | Stop reason: SDUPTHER

## 2021-10-07 NOTE — PROGRESS NOTES
Subjective   Jaquan Carlson is a 51 y.o. female.     Pt presents to the clinic via telehealth to establish care and for medication refills. Pt also reports having issues with unwanted weight gain. Pt states that she has went from 135-210 over the past couple of years and despite being on saxenda, she is having difficulty losing any weight.     Hypertension  This is a chronic problem. The current episode started more than 1 year ago. The problem is controlled. There are no associated agents to hypertension. Risk factors for coronary artery disease include obesity. Current antihypertension treatment includes ACE inhibitors. The current treatment provides moderate improvement. There are no compliance problems.         The following portions of the patient's history were reviewed and updated as appropriate: allergies, current medications, past family history, past medical history, past social history, past surgical history and problem list.    Review of Systems   Constitutional:        Weight gain   All other systems reviewed and are negative.    See history of Present Illness     Objective     Physical Exam   Constitutional: She appears well-developed and well-nourished. No distress.   HENT:   Head: Normocephalic.   Neck: Neck normal appearance.  Pulmonary/Chest: Effort normal.   Abdominal: Abdomen appears normal.   Musculoskeletal: Normal range of motion.   Neurological: She is alert.   Skin: Skin is warm and dry.   Psychiatric: She has a normal mood and affect. Her speech is normal and behavior is normal. Thought content normal.       No flowsheet data found.    Patient's Body mass index is 36.05 kg/m². indicating that she is obese (BMI >30). Obesity-related health conditions include the following: hypertension and GERD. Obesity is worsening. BMI is is above average; BMI management plan is completed. We discussed portion control, increasing exercise and pharmacologic options including phentermine..   (Normal BMI:   18.5-24.9, OW 25-29.9, Obesity 30 or greater)      Assessment/Plan     Problems Addressed this Visit        Endocrine and Metabolic    Vitamin D deficiency - Primary    Relevant Medications    vitamin D (ERGOCALCIFEROL) 1.25 MG (13658 UT) capsule capsule    Other Relevant Orders    Vitamin B12      Other Visit Diagnoses     Essential (primary) hypertension        Relevant Medications    lisinopril (PRINIVIL,ZESTRIL) 10 MG tablet    Other Relevant Orders    CBC & Differential    Comprehensive Metabolic Panel    Ferritin    Hemoglobin A1c    Hepatitis C Antibody    Iron Profile    Lipid Panel    T4, Free    TSH    Vitamin B12    Folate    Vitamin D 25 Hydroxy    Magnesium    Unintended weight gain        Relevant Medications    Liraglutide (SAXENDA) 18 MG/3ML injection pen    phentermine 37.5 MG capsule    Other Relevant Orders    Comprehensive Metabolic Panel    Hemoglobin A1c    T4, Free    TSH    Body mass index (BMI) of 36.0 to 36.9 in adult        Relevant Medications    Liraglutide (SAXENDA) 18 MG/3ML injection pen    phentermine 37.5 MG capsule    Gastroesophageal reflux disease, unspecified whether esophagitis present        Relevant Orders    XR Abdomen KUB    Encounter to establish care        Relevant Orders    XR Abdomen KUB    CBC & Differential    Comprehensive Metabolic Panel    Ferritin    Hemoglobin A1c    Hepatitis C Antibody    Iron Profile    Lipid Panel    T4, Free    TSH    Vitamin B12    Folate    Vitamin D 25 Hydroxy    Magnesium      Diagnoses       Codes Comments    Vitamin D deficiency    -  Primary ICD-10-CM: E55.9  ICD-9-CM: 268.9     Essential (primary) hypertension     ICD-10-CM: I10  ICD-9-CM: 401.9     Unintended weight gain     ICD-10-CM: R63.5  ICD-9-CM: 783.1     Body mass index (BMI) of 36.0 to 36.9 in adult     ICD-10-CM: Z68.36  ICD-9-CM: V85.36     Gastroesophageal reflux disease, unspecified whether esophagitis present     ICD-10-CM: K21.9  ICD-9-CM: 530.81     Encounter to  establish care     ICD-10-CM: Z76.89  ICD-9-CM: V65.8           You have chosen to receive care through a telephone visit. Do you consent to use a telephone visit for your medical care today? Yes    This visit has been rescheduled as a phone visit to comply with patient safety concerns in accordance with CDC recommendations. Total time of discussion was 15 minutes.                 This document has been electronically signed by PAVAN Rojas  October 7, 2021 16:56 EDT    Part of this note may be an electronic transcription/translation of spoken language to printed text using the Dragon Dictation System.

## 2021-10-07 NOTE — PATIENT INSTRUCTIONS
"BMI for Adults  What is BMI?  Body mass index (BMI) is a number that is calculated from a person's weight and height. BMI can help estimate how much of a person's weight is composed of fat. BMI does not measure body fat directly. Rather, it is an alternative to procedures that directly measure body fat, which can be difficult and expensive.  BMI can help identify people who may be at higher risk for certain medical problems.  What are BMI measurements used for?  BMI is used as a screening tool to identify possible weight problems. It helps determine whether a person is obese, overweight, a healthy weight, or underweight.  BMI is useful for:  · Identifying a weight problem that may be related to a medical condition or may increase the risk for medical problems.  · Promoting changes, such as changes in diet and exercise, to help reach a healthy weight. BMI screening can be repeated to see if these changes are working.  How is BMI calculated?  BMI involves measuring your weight in relation to your height. Both height and weight are measured, and the BMI is calculated from those numbers. This can be done either in English ([place] or metric measurements. Note that charts and online BMI calculators are available to help you find your BMI quickly and easily without having to do these calculations yourself.  To calculate your BMI in English ([place] measurements:    1. Measure your weight in pounds (lb).  2. Multiply the number of pounds by 703.  ? For example, for a person who weighs 180 lb, multiply that number by 703, which equals 126,540.  3. Measure your height in inches. Then multiply that number by itself to get a measurement called \"inches squared.\"  ? For example, for a person who is 70 inches tall, the \"inches squared\" measurement is 70 inches x 70 inches, which equals 4,900 inches squared.  4. Divide the total from step 2 (number of lb x 703) by the total from step 3 (inches squared): 126,540 ÷ 4,900 = 25.8. This is " "your BMI.    To calculate your BMI in metric measurements:  1. Measure your weight in kilograms (kg).  2. Measure your height in meters (m). Then multiply that number by itself to get a measurement called \"meters squared.\"  ? For example, for a person who is 1.75 m tall, the \"meters squared\" measurement is 1.75 m x 1.75 m, which is equal to 3.1 meters squared.  3. Divide the number of kilograms (your weight) by the meters squared number. In this example: 70 ÷ 3.1 = 22.6. This is your BMI.  What do the results mean?  BMI charts are used to identify whether you are underweight, normal weight, overweight, or obese. The following guidelines will be used:  · Underweight: BMI less than 18.5.  · Normal weight: BMI between 18.5 and 24.9.  · Overweight: BMI between 25 and 29.9.  · Obese: BMI of 30 or above.  Keep these notes in mind:  · Weight includes both fat and muscle, so someone with a muscular build, such as an athlete, may have a BMI that is higher than 24.9. In cases like these, BMI is not an accurate measure of body fat.  · To determine if excess body fat is the cause of a BMI of 25 or higher, further assessments may need to be done by a health care provider.  · BMI is usually interpreted in the same way for men and women.  Where to find more information  For more information about BMI, including tools to quickly calculate your BMI, go to these websites:  · Centers for Disease Control and Prevention: www.cdc.gov  · American Heart Association: www.heart.org  · National Heart, Lung, and Blood [place]: www.nhlbi.nih.gov  Summary  · Body mass index (BMI) is a number that is calculated from a person's weight and height.  · BMI may help estimate how much of a person's weight is composed of fat. BMI can help identify those who may be at higher risk for certain medical problems.  · BMI can be measured using English measurements or metric measurements.  · BMI charts are used to identify whether you are underweight, normal " weight, overweight, or obese.  This information is not intended to replace advice given to you by your health care provider. Make sure you discuss any questions you have with your health care provider.  Document Revised: 09/09/2020 Document Reviewed: 07/17/2020  Elsevier Patient Education © 2021 [employer]    Food Choices for Gastroesophageal Reflux Disease, Adult  When you have gastroesophageal reflux disease (GERD), the foods you eat and your eating habits are very important. Choosing the right foods can help ease your discomfort. Think about working with a food expert (dietitian) to help you make good choices.  What are tips for following this plan?  Reading food labels  · Look for foods that are low in saturated fat. Foods that may help with your symptoms include:  ? Foods that have less than 5% of daily value (DV) of fat.  ? Foods that have 0 grams of trans fat.  Cooking  · Do not yeboah your food.  · Cook your food by baking, steaming, grilling, or broiling. These are all methods that do not need a lot of fat for cooking.  · To add flavor, try to use herbs that are low in spice and acidity.  Meal planning    · Choose healthy foods that are low in fat, such as:  ? Fruits and vegetables.  ? Whole grains.  ? Low-fat dairy products.  ? Lean meats, fish, and poultry.  · Eat small meals often instead of eating 3 large meals each day. Eat your meals slowly in a place where you are relaxed. Avoid bending over or lying down until 2-3 hours after eating.  · Limit high-fat foods such as fatty meats or fried foods.  · Limit your intake of fatty foods, such as oils, butter, and shortening.  · Avoid the following as told by your doctor:  ? Foods that cause symptoms. These may be different for different people. Keep a food diary to keep track of foods that cause symptoms.  ? Alcohol.  ? Drinking a lot of liquid with meals.  ? Eating meals during the 2-3 hours before bed.    Lifestyle  · Stay at a healthy weight. Ask your  doctor what weight is healthy for you. If you need to lose weight, work with your doctor to do so safely.  · Exercise for at least 30 minutes on 5 or more days each week, or as told by your doctor.  · Wear loose-fitting clothes.  · Do not smoke or use any products that contain nicotine or tobacco. If you need help quitting, ask your doctor.  · Sleep with the head of your bed higher than your feet. Use a wedge under the mattress or blocks under the bed frame to raise the head of the bed.  · Chew sugar-free gum after meals.  What foods should eat?    Eat a healthy, well-balanced diet of fruits, vegetables, whole grains, low-fat dairy products, lean meats, fish, and poultry. Each person is different.  Foods that may cause symptoms in one person may not cause any symptoms in another person. Work with your doctor to find foods that are safe for you.  The items listed above may not be a complete list of what you can eat and drink. Contact a food expert for more options.  What foods should I avoid?  Limiting some of these foods may help in managing the symptoms of GERD. Everyone is different. Talk with a food expert or your doctor to help you find the exact foods to avoid, if any.  Fruits  Any fruits prepared with added fat. Any fruits that cause symptoms. For some people, this may include citrus fruits, such as oranges, grapefruit, pineapple, and arleen.  Vegetables  Deep-fried vegetables. French fries. Any vegetables prepared with added fat. Any vegetables that cause symptoms. For some people, this may include tomatoes and tomato products, chili peppers, onions and garlic, and horseradish.  Grains  Pastries or quick breads with added fat.  Meats and other proteins  High-fat meats, such as fatty beef or pork, hot dogs, ribs, ham, sausage, salami, and kimbrough. Fried meat or protein, including fried fish and fried chicken. Nuts and nut butters, in large amounts.  Dairy  Whole milk and chocolate milk. Sour cream. Cream. Ice  cream. Cream cheese. Milkshakes.  Fats and oils  Butter. Margarine. Shortening. Ghee.  Beverages  Coffee and tea, with or without caffeine. Carbonated beverages. Sodas. Energy drinks. Fruit juice made with acidic fruits, such as orange or grapefruit. Tomato juice. Alcoholic drinks.  Sweets and desserts  Chocolate and cocoa. Donuts.  Seasonings and condiments  Pepper. Peppermint and spearmint. Added salt. Any condiments, herbs, or seasonings that cause symptoms. For some people, this may include torres, hot sauce, or vinegar-based salad dressings.  The items listed above may not be a complete list of what you should not eat and drink. Contact a food expert for more options.  Questions to ask your doctor  Diet and lifestyle changes are often the first steps that are taken to manage symptoms of GERD. If diet and lifestyle changes do not help, talk with your doctor about taking medicines.  Where to find more information  · International Foundation for Gastrointestinal Disorders: aboutgerd.org  Summary  · When you have GERD, food and lifestyle choices are very important in easing your symptoms.  · Eat small meals often instead of 3 large meals a day. Eat your meals slowly and in a place where you are relaxed.  · Avoid bending over or lying down until 2-3 hours after eating.  · Limit high-fat foods such as fatty meats or fried foods.  This information is not intended to replace advice given to you by your health care provider. Make sure you discuss any questions you have with your health care provider.  Document Revised: 06/28/2021 Document Reviewed: 06/28/2021  [employer] Patient Education © 2021 [employer]    Hypertension, Adult  Hypertension is another name for high blood pressure. High blood pressure forces your heart to work harder to pump blood. This can cause problems over time.  There are two numbers in a blood pressure reading. There is a top number (systolic) over a bottom number (diastolic). It is best to have  a blood pressure that is below 120/80. Healthy choices can help lower your blood pressure, or you may need medicine to help lower it.  What are the causes?  The cause of this condition is not known. Some conditions may be related to high blood pressure.  What increases the risk?  · Smoking.  · Having type 2 diabetes mellitus, high cholesterol, or both.  · Not getting enough exercise or physical activity.  · Being overweight.  · Having too much fat, sugar, calories, or salt (sodium) in your diet.  · Drinking too much alcohol.  · Having long-term (chronic) kidney disease.  · Having a family history of high blood pressure.  · Age. Risk increases with age.  · Race. You may be at higher risk if you are .  · Gender. Men are at higher risk than women before age 45. After age 65, women are at higher risk than men.  · Having obstructive sleep apnea.  · Stress.  What are the signs or symptoms?  · High blood pressure may not cause symptoms. Very high blood pressure (hypertensive crisis) may cause:  ? Headache.  ? Feelings of worry or nervousness (anxiety).  ? Shortness of breath.  ? Nosebleed.  ? A feeling of being sick to your stomach (nausea).  ? Throwing up (vomiting).  ? Changes in how you see.  ? Very bad chest pain.  ? Seizures.  How is this treated?  · This condition is treated by making healthy lifestyle changes, such as:  ? Eating healthy foods.  ? Exercising more.  ? Drinking less alcohol.  · Your health care provider may prescribe medicine if lifestyle changes are not enough to get your blood pressure under control, and if:  ? Your top number is above 130.  ? Your bottom number is above 80.  · Your personal target blood pressure may vary.  Follow these instructions at home:  Eating and drinking    · If told, follow the DASH eating plan. To follow this plan:  ? Fill one half of your plate at each meal with fruits and vegetables.  ? Fill one fourth of your plate at each meal with whole grains. Whole  grains include whole-wheat pasta, brown rice, and whole-grain bread.  ? Eat or drink low-fat dairy products, such as skim milk or low-fat yogurt.  ? Fill one fourth of your plate at each meal with low-fat (lean) proteins. Low-fat proteins include fish, chicken without skin, eggs, beans, and tofu.  ? Avoid fatty meat, cured and processed meat, or chicken with skin.  ? Avoid pre-made or processed food.  · Eat less than 1,500 mg of salt each day.  · Do not drink alcohol if:  ? Your doctor tells you not to drink.  ? You are pregnant, may be pregnant, or are planning to become pregnant.  · If you drink alcohol:  ? Limit how much you use to:  § 0-1 drink a day for women.  § 0-2 drinks a day for men.  ? Be aware of how much alcohol is in your drink. In the [place] one drink equals one 12 oz bottle of beer (355 mL), one 5 oz glass of wine (148 mL), or one 1½ oz glass of hard liquor (44 mL).    Lifestyle    · Work with your doctor to stay at a healthy weight or to lose weight. Ask your doctor what the best weight is for you.  · Get at least 30 minutes of exercise most days of the week. This may include walking, swimming, or biking.  · Get at least 30 minutes of exercise that strengthens your muscles (resistance exercise) at least 3 days a week. This may include lifting weights or doing Pilates.  · Do not use any products that contain nicotine or tobacco, such as cigarettes, e-cigarettes, and chewing tobacco. If you need help quitting, ask your doctor.  · Check your blood pressure at home as told by your doctor.  · Keep all follow-up visits as told by your doctor. This is important.    Medicines  · Take over-the-counter and prescription medicines only as told by your doctor. Follow directions carefully.  · Do not skip doses of blood pressure medicine. The medicine does not work as well if you skip doses. Skipping doses also puts you at risk for problems.  · Ask your doctor about side effects or reactions to medicines that you  should watch for.  Contact a doctor if you:  · Think you are having a reaction to the medicine you are taking.  · Have headaches that keep coming back (recurring).  · Feel dizzy.  · Have swelling in your ankles.  · Have trouble with your vision.  Get help right away if you:  · Get a very bad headache.  · Start to feel mixed up (confused).  · Feel weak or numb.  · Feel faint.  · Have very bad pain in your:  ? Chest.  ? Belly (abdomen).  · Throw up more than once.  · Have trouble breathing.  Summary  · Hypertension is another name for high blood pressure.  · High blood pressure forces your heart to work harder to pump blood.  · For most people, a normal blood pressure is less than 120/80.  · Making healthy choices can help lower blood pressure. If your blood pressure does not get lower with healthy choices, you may need to take medicine.  This information is not intended to replace advice given to you by your health care provider. Make sure you discuss any questions you have with your health care provider.  Document Revised: 08/28/2019 Document Reviewed: 08/28/2019  [employer] Patient Education © 2021 [employer]    Obesity, Adult  Obesity is having too much body fat. Being obese means that your weight is more than what is healthy for you.  BMI is a number that explains how much body fat you have. If you have a BMI of 30 or more, you are obese. Obesity is often caused by eating or drinking more calories than your body uses. Changing your lifestyle can help you lose weight.  Obesity can cause serious health problems, such as:  · Stroke.  · Coronary artery disease (CAD).  · Type 2 diabetes.  · Some types of cancer, including cancers of the colon, breast, uterus, and gallbladder.  · Osteoarthritis.  · High blood pressure (hypertension).  · High cholesterol.  · Sleep apnea.  · Gallbladder stones.  · Infertility problems.  What are the causes?  · Eating meals each day that are high in calories, sugar, and fat.  · Being  born with genes that may make you more likely to become obese.  · Having a medical condition that causes obesity.  · Taking certain medicines.  · Sitting a lot (having a sedentary lifestyle).  · Not getting enough sleep.  · Drinking a lot of drinks that have sugar in them.  What increases the risk?  · Having a family history of obesity.  · Being an  woman.  · Being a  man.  · Living in an area with limited access to:  ? [place], recreation centers, or sidewalks.  ? Healthy food choices, such as grocery stores and farmers' markets.  What are the signs or symptoms?  The main sign is having too much body fat.  How is this treated?  · Treatment for this condition often includes changing your lifestyle. Treatment may include:  ? Changing your diet. This may include making a healthy meal plan.  ? Exercise. This may include activity that causes your heart to beat faster (aerobic exercise) and strength training. Work with your doctor to design a program that works for you.  ? Medicine to help you lose weight. This may be used if you are not able to lose 1 pound a week after 6 weeks of healthy eating and more exercise.  ? Treating conditions that cause the obesity.  ? Surgery. Options may include gastric banding and gastric bypass. This may be done if:  § Other treatments have not helped to improve your condition.  § You have a BMI of 40 or higher.  § You have life-threatening health problems related to obesity.  Follow these instructions at home:  Eating and drinking    · Follow advice from your doctor about what to eat and drink. Your doctor may tell you to:  ? Limit fast food, sweets, and processed snack foods.  ? Choose low-fat options. For example, choose low-fat milk instead of whole milk.  ? Eat 5 or more servings of fruits or vegetables each day.  ? Eat at home more often. This gives you more control over what you eat.  ? Choose healthy foods when you eat out.  ? Learn to read food labels. This  will help you learn how much food is in 1 serving.  ? Keep low-fat snacks available.  ? Avoid drinks that have a lot of sugar in them. These include soda, fruit juice, iced tea with sugar, and flavored milk.  · Drink enough water to keep your pee (urine) pale yellow.  · Do not go on fad diets.    Physical activity  · Exercise often, as told by your doctor. Most adults should get up to 150 minutes of moderate-intensity exercise every week.Ask your doctor:  ? What types of exercise are safe for you.  ? How often you should exercise.  · Warm up and stretch before being active.  · Do slow stretching after being active (cool down).  · Rest between times of being active.  Lifestyle  · Work with your doctor and a food expert (dietitian) to set a weight-loss goal that is best for you.  · Limit your screen time.  · Find ways to reward yourself that do not involve food.  · Do not drink alcohol if:  ? Your doctor tells you not to drink.  ? You are pregnant, may be pregnant, or are planning to become pregnant.  · If you drink alcohol:  ? Limit how much you use to:  § 0-1 drink a day for women.  § 0-2 drinks a day for men.  ? Be aware of how much alcohol is in your drink. In the [place] one drink equals one 12 oz bottle of beer (355 mL), one 5 oz glass of wine (148 mL), or one 1½ oz glass of hard liquor (44 mL).  General instructions  · Keep a weight-loss journal. This can help you keep track of:  ? The food that you eat.  ? How much exercise you get.  · Take over-the-counter and prescription medicines only as told by your doctor.  · Take vitamins and supplements only as told by your doctor.  · Think about joining a support group.  · Keep all follow-up visits as told by your doctor. This is important.  Contact a doctor if:  · You cannot meet your weight loss goal after you have changed your diet and lifestyle for 6 weeks.  Get help right away if you:  · Are having trouble breathing.  · Are having thoughts of harming  yourself.  Summary  · Obesity is having too much body fat.  · Being obese means that your weight is more than what is healthy for you.  · Work with your doctor to set a weight-loss goal.  · Get regular exercise as told by your doctor.  This information is not intended to replace advice given to you by your health care provider. Make sure you discuss any questions you have with your health care provider.  Document Revised: 2019 Document Reviewed: 2019  [employer] Patient Education ©  [employer]  Nonsurgical Weight Loss Treatment Options  Nonsurgical weight loss has many different components. You will learn how to work with a specialized care team to incorporate diet, exercise, and lifestyle changes into your life.  To view the content, go to this web address:  [email or web address]    This video will  on: 2023. If you need access to this video following this date, please reach out to the healthcare provider who assigned it to you.  This information is not intended to replace advice given to you by your health care provider. Make sure you discuss any questions you have with your health care provider.  [employer]’s editorial and clinical teams regularly review and update content to ensure it is up-to-date with changing practice standards and recognized medical guidelines.  Document Revised: 2021  [employer] Patient Education ©  [employer]    Vitamin D Deficiency  Vitamin D deficiency is when your body does not have enough vitamin D. Vitamin D is important to your body because:  · It helps your body use other minerals.  · It helps to keep your bones strong and healthy.  · It may help to prevent some diseases.  · It helps your heart and other muscles work well.  Not getting enough vitamin D can make your bones soft. It can also cause other health problems.  What are the causes?  This condition may be caused by:  · Not eating enough foods that contain vitamin D.  · Not  getting enough sun.  · Having diseases that make it hard for your body to absorb vitamin D.  · Having a surgery in which a part of the stomach or a part of the small intestine is removed.  · Having kidney disease or liver disease.  What increases the risk?  You are more likely to get this condition if:  · You are older.  · You do not spend much time outdoors.  · You live in a nursing home.  · You have had broken bones.  · You have weak or thin bones (osteoporosis).  · You have a disease or condition that changes how your body absorbs vitamin D.  · You have dark skin.  · You take certain medicines.  · You are overweight or obese.  What are the signs or symptoms?  · In mild cases, there may not be any symptoms. If the condition is very bad, symptoms may include:  ? Bone pain.  ? Muscle pain.  ? Falling often.  ? Broken bones caused by a minor injury.  How is this treated?  Treatment may include taking supplements as told by your doctor. Your doctor will tell you what dose is best for you. Supplements may include:  · Vitamin D.  · Calcium.  Follow these instructions at home:  Eating and drinking    · Eat foods that contain vitamin D, such as:  ? Dairy products, cereals, or juices with added vitamin D. Check the label.  ? Fish, such as salmon or trout.  ? Eggs.  ? Oysters.  ? Mushrooms.  The items listed above may not be a complete list of what you can eat and drink. Contact a dietitian for more options.  General instructions  · Take medicines and supplements only as told by your doctor.  · Get regular, safe exposure to natural sunlight.  · Do not use a tanning bed.  · Maintain a healthy weight. Lose weight if needed.  · Keep all follow-up visits as told by your doctor. This is important.  How is this prevented?  · You can get vitamin D by:  ? Eating foods that naturally contain vitamin D.  ? Eating or drinking products that have vitamin D added to them, such as cereals, juices, and milk.  ? Taking vitamin D or a  multivitamin that contains vitamin D.  ? Being in the sun. Your body makes vitamin D when your skin is exposed to sunlight. Your body changes the sunlight into a form of the vitamin that it can use.  Contact a doctor if:  · Your symptoms do not go away.  · You feel sick to your stomach (nauseous).  · You throw up (vomit).  · You poop less often than normal, or you have trouble pooping (constipation).  Summary  · Vitamin D deficiency is when your body does not have enough vitamin D.  · Vitamin D helps to keep your bones strong and healthy.  · This condition is often treated by taking a supplement.  · Your doctor will tell you what dose is best for you.  This information is not intended to replace advice given to you by your health care provider. Make sure you discuss any questions you have with your health care provider.  Document Revised: 08/26/2019 Document Reviewed: 08/26/2019  [email or web address] Patient Education © 2021 [employer]

## 2021-10-08 ENCOUNTER — IMMUNIZATION (OUTPATIENT)
Dept: VACCINE CLINIC | Facility: HOSPITAL | Age: 51
End: 2021-10-08

## 2021-10-08 PROCEDURE — 0004A ADM SARSCOV2 30MCG/0.3ML BOOSTER: CPT | Performed by: INTERNAL MEDICINE

## 2021-10-08 PROCEDURE — 91300 HC SARSCOV02 VAC 30MCG/0.3ML IM: CPT | Performed by: INTERNAL MEDICINE

## 2021-10-08 PROCEDURE — 0003A: CPT | Performed by: INTERNAL MEDICINE

## 2021-10-19 ENCOUNTER — HOSPITAL ENCOUNTER (OUTPATIENT)
Dept: GENERAL RADIOLOGY | Facility: HOSPITAL | Age: 51
Discharge: HOME | End: 2021-10-19

## 2021-10-19 ENCOUNTER — LAB (OUTPATIENT)
Dept: LAB | Facility: HOSPITAL | Age: 51
End: 2021-10-19

## 2021-10-19 DIAGNOSIS — I10 ESSENTIAL (PRIMARY) HYPERTENSION: ICD-10-CM

## 2021-10-19 DIAGNOSIS — E55.9 VITAMIN D DEFICIENCY: ICD-10-CM

## 2021-10-19 DIAGNOSIS — Z76.89 ENCOUNTER TO ESTABLISH CARE: Primary | ICD-10-CM

## 2021-10-19 DIAGNOSIS — Z76.89 ENCOUNTER TO ESTABLISH CARE: ICD-10-CM

## 2021-10-19 DIAGNOSIS — K21.9 GASTROESOPHAGEAL REFLUX DISEASE, UNSPECIFIED WHETHER ESOPHAGITIS PRESENT: ICD-10-CM

## 2021-10-19 DIAGNOSIS — R63.5 UNINTENDED WEIGHT GAIN: ICD-10-CM

## 2021-10-19 LAB
25(OH)D3 SERPL-MCNC: 45.1 NG/ML (ref 30–100)
ALBUMIN SERPL-MCNC: 4.7 G/DL (ref 3.5–5.2)
ALBUMIN/GLOB SERPL: 1.9 G/DL
ALP SERPL-CCNC: 82 U/L (ref 39–117)
ALT SERPL W P-5'-P-CCNC: 40 U/L (ref 1–33)
ANION GAP SERPL CALCULATED.3IONS-SCNC: 14.2 MMOL/L (ref 5–15)
AST SERPL-CCNC: 28 U/L (ref 1–32)
BASOPHILS # BLD MANUAL: 0.06 10*3/MM3 (ref 0–0.2)
BASOPHILS NFR BLD AUTO: 1.1 % (ref 0–1.5)
BILIRUB SERPL-MCNC: 0.3 MG/DL (ref 0–1.2)
BUN SERPL-MCNC: 13 MG/DL (ref 6–20)
BUN/CREAT SERPL: 10.2 (ref 7–25)
CALCIUM SPEC-SCNC: 9.6 MG/DL (ref 8.6–10.5)
CHLORIDE SERPL-SCNC: 104 MMOL/L (ref 98–107)
CHOLEST SERPL-MCNC: 223 MG/DL (ref 0–200)
CO2 SERPL-SCNC: 22.8 MMOL/L (ref 22–29)
CREAT SERPL-MCNC: 1.27 MG/DL (ref 0.57–1)
DEPRECATED RDW RBC AUTO: 42.9 FL (ref 37–54)
EOSINOPHIL # BLD MANUAL: 0.06 10*3/MM3 (ref 0–0.4)
EOSINOPHIL NFR BLD MANUAL: 1.1 % (ref 0.3–6.2)
ERYTHROCYTE [DISTWIDTH] IN BLOOD BY AUTOMATED COUNT: 13.3 % (ref 12.3–15.4)
FERRITIN SERPL-MCNC: 108 NG/ML (ref 13–150)
FOLATE SERPL-MCNC: 11 NG/ML (ref 4.78–24.2)
GFR SERPL CREATININE-BSD FRML MDRD: 44 ML/MIN/1.73
GLOBULIN UR ELPH-MCNC: 2.5 GM/DL
GLUCOSE SERPL-MCNC: 92 MG/DL (ref 65–99)
HBA1C MFR BLD: 6.13 % (ref 4.8–5.6)
HCT VFR BLD AUTO: 43.2 % (ref 34–46.6)
HCV AB SER DONR QL: NORMAL
HDLC SERPL-MCNC: 44 MG/DL (ref 40–60)
HGB BLD-MCNC: 14.6 G/DL (ref 12–15.9)
IRON 24H UR-MRATE: 73 MCG/DL (ref 37–145)
IRON SATN MFR SERPL: 15 % (ref 20–50)
LDLC SERPL CALC-MCNC: 144 MG/DL (ref 0–100)
LDLC/HDLC SERPL: 3.2 {RATIO}
LYMPHOCYTES # BLD MANUAL: 3.31 10*3/MM3 (ref 0.7–3.1)
LYMPHOCYTES NFR BLD MANUAL: 4.3 % (ref 5–12)
LYMPHOCYTES NFR BLD MANUAL: 59.6 % (ref 19.6–45.3)
MAGNESIUM SERPL-MCNC: 2.1 MG/DL (ref 1.6–2.6)
MCH RBC QN AUTO: 29.6 PG (ref 26.6–33)
MCHC RBC AUTO-ENTMCNC: 33.8 G/DL (ref 31.5–35.7)
MCV RBC AUTO: 87.6 FL (ref 79–97)
MONOCYTES # BLD AUTO: 0.24 10*3/MM3 (ref 0.1–0.9)
NEUTROPHILS # BLD AUTO: 1.89 10*3/MM3 (ref 1.7–7)
NEUTROPHILS NFR BLD MANUAL: 34 % (ref 42.7–76)
NRBC BLD AUTO-RTO: 0.2 /100 WBC (ref 0–0.2)
PLAT MORPH BLD: NORMAL
PLATELET # BLD AUTO: 356 10*3/MM3 (ref 140–450)
PMV BLD AUTO: 10.3 FL (ref 6–12)
POTASSIUM SERPL-SCNC: 4.6 MMOL/L (ref 3.5–5.2)
PROT SERPL-MCNC: 7.2 G/DL (ref 6–8.5)
RBC # BLD AUTO: 4.93 10*6/MM3 (ref 3.77–5.28)
RBC MORPH BLD: NORMAL
SODIUM SERPL-SCNC: 141 MMOL/L (ref 136–145)
T4 FREE SERPL-MCNC: 1.32 NG/DL (ref 0.93–1.7)
TIBC SERPL-MCNC: 484 MCG/DL (ref 298–536)
TRANSFERRIN SERPL-MCNC: 325 MG/DL (ref 200–360)
TRIGL SERPL-MCNC: 192 MG/DL (ref 0–150)
TSH SERPL DL<=0.05 MIU/L-ACNC: 4.95 UIU/ML (ref 0.27–4.2)
VIT B12 BLD-MCNC: 954 PG/ML (ref 211–946)
VLDLC SERPL-MCNC: 35 MG/DL (ref 5–40)
WBC # BLD AUTO: 5.55 10*3/MM3 (ref 3.4–10.8)
WBC MORPH BLD: NORMAL

## 2021-10-19 PROCEDURE — 83540 ASSAY OF IRON: CPT

## 2021-10-19 PROCEDURE — 74018 RADEX ABDOMEN 1 VIEW: CPT

## 2021-10-19 PROCEDURE — 82306 VITAMIN D 25 HYDROXY: CPT

## 2021-10-19 PROCEDURE — 86803 HEPATITIS C AB TEST: CPT

## 2021-10-19 PROCEDURE — 83036 HEMOGLOBIN GLYCOSYLATED A1C: CPT

## 2021-10-19 PROCEDURE — 80053 COMPREHEN METABOLIC PANEL: CPT

## 2021-10-19 PROCEDURE — 36415 COLL VENOUS BLD VENIPUNCTURE: CPT

## 2021-10-19 PROCEDURE — 84443 ASSAY THYROID STIM HORMONE: CPT

## 2021-10-19 PROCEDURE — 84439 ASSAY OF FREE THYROXINE: CPT

## 2021-10-19 PROCEDURE — 74018 RADEX ABDOMEN 1 VIEW: CPT | Performed by: RADIOLOGY

## 2021-10-19 PROCEDURE — 82746 ASSAY OF FOLIC ACID SERUM: CPT

## 2021-10-19 PROCEDURE — 84466 ASSAY OF TRANSFERRIN: CPT

## 2021-10-19 PROCEDURE — 82728 ASSAY OF FERRITIN: CPT

## 2021-10-19 PROCEDURE — 85007 BL SMEAR W/DIFF WBC COUNT: CPT

## 2021-10-19 PROCEDURE — 85025 COMPLETE CBC W/AUTO DIFF WBC: CPT

## 2021-10-19 PROCEDURE — 83735 ASSAY OF MAGNESIUM: CPT | Performed by: NURSE PRACTITIONER

## 2021-10-19 PROCEDURE — 82607 VITAMIN B-12: CPT

## 2021-10-19 PROCEDURE — 80061 LIPID PANEL: CPT

## 2021-10-21 ENCOUNTER — TELEPHONE (OUTPATIENT)
Dept: FAMILY MEDICINE CLINIC | Age: 51
End: 2021-10-21

## 2021-11-04 DIAGNOSIS — R63.5 UNINTENDED WEIGHT GAIN: ICD-10-CM

## 2021-11-04 RX ORDER — PHENTERMINE HYDROCHLORIDE 37.5 MG/1
37.5 CAPSULE ORAL EVERY MORNING
Qty: 30 CAPSULE | Refills: 0 | Status: SHIPPED | OUTPATIENT
Start: 2021-11-04 | End: 2021-12-06 | Stop reason: SDUPTHER

## 2021-11-04 NOTE — TELEPHONE ENCOUNTER
Adonis's patient. Can you cover for her? She is put sick today.    Patient has an appt today but had to be rescheduled.She is needing enough Phentamine to last until she see's Adonis on 11/9.

## 2021-11-09 ENCOUNTER — TELEMEDICINE (OUTPATIENT)
Dept: FAMILY MEDICINE CLINIC | Age: 51
End: 2021-11-09

## 2021-11-09 VITALS — HEIGHT: 64 IN | WEIGHT: 194 LBS | BODY MASS INDEX: 33.12 KG/M2

## 2021-11-09 DIAGNOSIS — E03.9 HYPOTHYROIDISM, UNSPECIFIED TYPE: ICD-10-CM

## 2021-11-09 DIAGNOSIS — E78.5 DYSLIPIDEMIA: Primary | ICD-10-CM

## 2021-11-09 DIAGNOSIS — R94.4 DECREASED GFR: ICD-10-CM

## 2021-11-09 DIAGNOSIS — E66.9 CLASS 1 OBESITY WITH BODY MASS INDEX (BMI) OF 33.0 TO 33.9 IN ADULT, UNSPECIFIED OBESITY TYPE, UNSPECIFIED WHETHER SERIOUS COMORBIDITY PRESENT: ICD-10-CM

## 2021-11-09 PROCEDURE — 99214 OFFICE O/P EST MOD 30 MIN: CPT | Performed by: NURSE PRACTITIONER

## 2021-11-09 RX ORDER — LEVOTHYROXINE SODIUM 0.05 MG/1
50 TABLET ORAL DAILY
Qty: 30 TABLET | Refills: 2 | Status: SHIPPED | OUTPATIENT
Start: 2021-11-09 | End: 2021-12-06 | Stop reason: SDUPTHER

## 2021-11-09 RX ORDER — CHLORAL HYDRATE 500 MG
1000 CAPSULE ORAL 2 TIMES DAILY WITH MEALS
Qty: 60 CAPSULE | Refills: 5 | Status: SHIPPED | OUTPATIENT
Start: 2021-11-09 | End: 2022-05-26 | Stop reason: SDUPTHER

## 2021-11-10 DIAGNOSIS — I10 ESSENTIAL (PRIMARY) HYPERTENSION: ICD-10-CM

## 2021-11-10 NOTE — PATIENT INSTRUCTIONS
"BMI for Adults  What is BMI?  Body mass index (BMI) is a number that is calculated from a person's weight and height. BMI can help estimate how much of a person's weight is composed of fat. BMI does not measure body fat directly. Rather, it is an alternative to procedures that directly measure body fat, which can be difficult and expensive.  BMI can help identify people who may be at higher risk for certain medical problems.  What are BMI measurements used for?  BMI is used as a screening tool to identify possible weight problems. It helps determine whether a person is obese, overweight, a healthy weight, or underweight.  BMI is useful for:  · Identifying a weight problem that may be related to a medical condition or may increase the risk for medical problems.  · Promoting changes, such as changes in diet and exercise, to help reach a healthy weight. BMI screening can be repeated to see if these changes are working.  How is BMI calculated?  BMI involves measuring your weight in relation to your height. Both height and weight are measured, and the BMI is calculated from those numbers. This can be done either in English ([place] or metric measurements. Note that charts and online BMI calculators are available to help you find your BMI quickly and easily without having to do these calculations yourself.  To calculate your BMI in English ([place] measurements:    1. Measure your weight in pounds (lb).  2. Multiply the number of pounds by 703.  ? For example, for a person who weighs 180 lb, multiply that number by 703, which equals 126,540.  3. Measure your height in inches. Then multiply that number by itself to get a measurement called \"inches squared.\"  ? For example, for a person who is 70 inches tall, the \"inches squared\" measurement is 70 inches x 70 inches, which equals 4,900 inches squared.  4. Divide the total from step 2 (number of lb x 703) by the total from step 3 (inches squared): 126,540 ÷ 4,900 = 25.8. This is " "your BMI.    To calculate your BMI in metric measurements:  1. Measure your weight in kilograms (kg).  2. Measure your height in meters (m). Then multiply that number by itself to get a measurement called \"meters squared.\"  ? For example, for a person who is 1.75 m tall, the \"meters squared\" measurement is 1.75 m x 1.75 m, which is equal to 3.1 meters squared.  3. Divide the number of kilograms (your weight) by the meters squared number. In this example: 70 ÷ 3.1 = 22.6. This is your BMI.  What do the results mean?  BMI charts are used to identify whether you are underweight, normal weight, overweight, or obese. The following guidelines will be used:  · Underweight: BMI less than 18.5.  · Normal weight: BMI between 18.5 and 24.9.  · Overweight: BMI between 25 and 29.9.  · Obese: BMI of 30 or above.  Keep these notes in mind:  · Weight includes both fat and muscle, so someone with a muscular build, such as an athlete, may have a BMI that is higher than 24.9. In cases like these, BMI is not an accurate measure of body fat.  · To determine if excess body fat is the cause of a BMI of 25 or higher, further assessments may need to be done by a health care provider.  · BMI is usually interpreted in the same way for men and women.  Where to find more information  For more information about BMI, including tools to quickly calculate your BMI, go to these websites:  · Centers for Disease Control and Prevention: www.cdc.gov  · American Heart Association: www.heart.org  · National Heart, Lung, and Blood Wann: www.nhlbi.nih.gov  Summary  · Body mass index (BMI) is a number that is calculated from a person's weight and height.  · BMI may help estimate how much of a person's weight is composed of fat. BMI can help identify those who may be at higher risk for certain medical problems.  · BMI can be measured using English measurements or metric measurements.  · BMI charts are used to identify whether you are underweight, normal " weight, overweight, or obese.  This information is not intended to replace advice given to you by your health care provider. Make sure you discuss any questions you have with your health care provider.  Document Revised: 09/09/2020 Document Reviewed: 07/17/2020  [employer] Patient Education © 2021 [employer]    Dyslipidemia  Dyslipidemia is an imbalance of waxy, fat-like substances (lipids) in the blood. The body needs lipids in small amounts. Dyslipidemia often involves a high level of cholesterol or triglycerides, which are types of lipids.  Common forms of dyslipidemia include:  · High levels of LDL cholesterol. LDL is the type of cholesterol that causes fatty deposits (plaques) to build up in the blood vessels that carry blood away from your heart (arteries).  · Low levels of HDL cholesterol. HDL cholesterol is the type of cholesterol that protects against heart disease. High levels of HDL remove the LDL buildup from arteries.  · High levels of triglycerides. Triglycerides are a fatty substance in the blood that is linked to a buildup of plaques in the arteries.  What are the causes?  Primary dyslipidemia is caused by changes (mutations) in genes that are passed down through families (inherited). These mutations cause several types of dyslipidemia.  Secondary dyslipidemia is caused by lifestyle choices and diseases that lead to dyslipidemia, such as:  · Eating a diet that is high in animal fat.  · Not getting enough exercise.  · Having diabetes, kidney disease, liver disease, or thyroid disease.  · Drinking large amounts of alcohol.  · Using certain medicines.  What increases the risk?  You are more likely to develop this condition if you are an older man or if you are a woman who has gone through menopause. Other risk factors include:  · Having a family history of dyslipidemia.  · Taking certain medicines, including birth control pills, steroids, some diuretics, and beta-blockers.  · Smoking  cigarettes.  · Eating a high-fat diet.  · Having certain medical conditions such as diabetes, polycystic ovary syndrome (PCOS), kidney disease, liver disease, or hypothyroidism.  · Not exercising regularly.  · Being overweight or obese with too much belly fat.  What are the signs or symptoms?  In most cases, dyslipidemia does not usually cause any symptoms.  In severe cases, very high lipid levels can cause:  · Fatty bumps under the skin (xanthomas).  · White or gray ring around the black center (pupil) of the eye.  Very high triglyceride levels can cause inflammation of the pancreas (pancreatitis).  How is this diagnosed?  Your health care provider may diagnose dyslipidemia based on a routine blood test (fasting blood test). Because most people do not have symptoms of the condition, this blood testing (lipid profile) is done on adults age 20 and older and is repeated every 5 years. This test checks:  · Total cholesterol. This measures the total amount of cholesterol in your blood, including LDL cholesterol, HDL cholesterol, and triglycerides. A healthy number is below 200.  · LDL cholesterol. The target number for LDL cholesterol is different for each person, depending on individual risk factors. Ask your health care provider what your LDL cholesterol should be.  · HDL cholesterol. An HDL level of 60 or higher is best because it helps to protect against heart disease. A number below 40 for men or below 50 for women increases the risk for heart disease.  · Triglycerides. A healthy triglyceride number is below 150.  If your lipid profile is abnormal, your health care provider may do other blood tests.  How is this treated?  Treatment depends on the type of dyslipidemia that you have and your other risk factors for heart disease and stroke. Your health care provider will have a target range for your lipid levels based on this information.  For many people, this condition may be treated by lifestyle changes, such as  diet and exercise. Your health care provider may recommend that you:  · Get regular exercise.  · Make changes to your diet.  · Quit smoking if you smoke.  If diet changes and exercise do not help you reach your goals, your health care provider may also prescribe medicine to lower lipids. The most commonly prescribed type of medicine lowers your LDL cholesterol (statin drug). If you have a high triglyceride level, your provider may prescribe another type of drug (fibrate) or an omega-3 fish oil supplement, or both.  Follow these instructions at home:    Eating and drinking  · Follow instructions from your health care provider or dietitian about eating or drinking restrictions.  · Eat a healthy diet as told by your health care provider. This can help you reach and maintain a healthy weight, lower your LDL cholesterol, and raise your HDL cholesterol. This may include:  ? Limiting your calories, if you are overweight.  ? Eating more fruits, vegetables, whole grains, fish, and lean meats.  ? Limiting saturated fat, trans fat, and cholesterol.  · If you drink alcohol:  ? Limit how much you use.  ? Be aware of how much alcohol is in your drink. In the [place] one drink equals one 12 oz bottle of beer (355 mL), one 5 oz glass of wine (148 mL), or one 1½ oz glass of hard liquor (44 mL).  · Do not drink alcohol if:  ? Your health care provider tells you not to drink.  ? You are pregnant, may be pregnant, or are planning to become pregnant.  Activity  · Get regular exercise. Start an exercise and strength training program as told by your health care provider. Ask your health care provider what activities are safe for you. Your health care provider may recommend:  ? 30 minutes of aerobic activity 4-6 days a week. Brisk walking is an example of aerobic activity.  ? Strength training 2 days a week.  General instructions  · Do not use any products that contain nicotine or tobacco, such as cigarettes, e-cigarettes, and chewing  tobacco. If you need help quitting, ask your health care provider.  · Take over-the-counter and prescription medicines only as told by your health care provider. This includes supplements.  · Keep all follow-up visits as told by your health care provider.  Contact a health care provider if:  · You are:  ? Having trouble sticking to your exercise or diet plan.  ? Struggling to quit smoking or control your use of alcohol.  Summary  · Dyslipidemia often involves a high level of cholesterol or triglycerides, which are types of lipids.  · Treatment depends on the type of dyslipidemia that you have and your other risk factors for heart disease and stroke.  · For many people, treatment starts with lifestyle changes, such as diet and exercise.  · Your health care provider may prescribe medicine to lower lipids.  This information is not intended to replace advice given to you by your health care provider. Make sure you discuss any questions you have with your health care provider.  Document Revised: 08/12/2019 Document Reviewed: 07/19/2019  [employer] Patient Education © 2021 [employer]    Obesity, Adult  Obesity is having too much body fat. Being obese means that your weight is more than what is healthy for you.  BMI is a number that explains how much body fat you have. If you have a BMI of 30 or more, you are obese. Obesity is often caused by eating or drinking more calories than your body uses. Changing your lifestyle can help you lose weight.  Obesity can cause serious health problems, such as:  · Stroke.  · Coronary artery disease (CAD).  · Type 2 diabetes.  · Some types of cancer, including cancers of the colon, breast, uterus, and gallbladder.  · Osteoarthritis.  · High blood pressure (hypertension).  · High cholesterol.  · Sleep apnea.  · Gallbladder stones.  · Infertility problems.  What are the causes?  · Eating meals each day that are high in calories, sugar, and fat.  · Being born with genes that may make you  more likely to become obese.  · Having a medical condition that causes obesity.  · Taking certain medicines.  · Sitting a lot (having a sedentary lifestyle).  · Not getting enough sleep.  · Drinking a lot of drinks that have sugar in them.  What increases the risk?  · Having a family history of obesity.  · Being an  woman.  · Being a  man.  · Living in an area with limited access to:  ? [place], recreation centers, or sidewalks.  ? Healthy food choices, such as grocery stores and farmers' markets.  What are the signs or symptoms?  The main sign is having too much body fat.  How is this treated?  · Treatment for this condition often includes changing your lifestyle. Treatment may include:  ? Changing your diet. This may include making a healthy meal plan.  ? Exercise. This may include activity that causes your heart to beat faster (aerobic exercise) and strength training. Work with your doctor to design a program that works for you.  ? Medicine to help you lose weight. This may be used if you are not able to lose 1 pound a week after 6 weeks of healthy eating and more exercise.  ? Treating conditions that cause the obesity.  ? Surgery. Options may include gastric banding and gastric bypass. This may be done if:  § Other treatments have not helped to improve your condition.  § You have a BMI of 40 or higher.  § You have life-threatening health problems related to obesity.  Follow these instructions at home:  Eating and drinking    · Follow advice from your doctor about what to eat and drink. Your doctor may tell you to:  ? Limit fast food, sweets, and processed snack foods.  ? Choose low-fat options. For example, choose low-fat milk instead of whole milk.  ? Eat 5 or more servings of fruits or vegetables each day.  ? Eat at home more often. This gives you more control over what you eat.  ? Choose healthy foods when you eat out.  ? Learn to read food labels. This will help you learn how much food  is in 1 serving.  ? Keep low-fat snacks available.  ? Avoid drinks that have a lot of sugar in them. These include soda, fruit juice, iced tea with sugar, and flavored milk.  · Drink enough water to keep your pee (urine) pale yellow.  · Do not go on fad diets.    Physical activity  · Exercise often, as told by your doctor. Most adults should get up to 150 minutes of moderate-intensity exercise every week.Ask your doctor:  ? What types of exercise are safe for you.  ? How often you should exercise.  · Warm up and stretch before being active.  · Do slow stretching after being active (cool down).  · Rest between times of being active.  Lifestyle  · Work with your doctor and a food expert (dietitian) to set a weight-loss goal that is best for you.  · Limit your screen time.  · Find ways to reward yourself that do not involve food.  · Do not drink alcohol if:  ? Your doctor tells you not to drink.  ? You are pregnant, may be pregnant, or are planning to become pregnant.  · If you drink alcohol:  ? Limit how much you use to:  § 0-1 drink a day for women.  § 0-2 drinks a day for men.  ? Be aware of how much alcohol is in your drink. In the [place] one drink equals one 12 oz bottle of beer (355 mL), one 5 oz glass of wine (148 mL), or one 1½ oz glass of hard liquor (44 mL).  General instructions  · Keep a weight-loss journal. This can help you keep track of:  ? The food that you eat.  ? How much exercise you get.  · Take over-the-counter and prescription medicines only as told by your doctor.  · Take vitamins and supplements only as told by your doctor.  · Think about joining a support group.  · Keep all follow-up visits as told by your doctor. This is important.  Contact a doctor if:  · You cannot meet your weight loss goal after you have changed your diet and lifestyle for 6 weeks.  Get help right away if you:  · Are having trouble breathing.  · Are having thoughts of harming yourself.  Summary  · Obesity is having too much  body fat.  · Being obese means that your weight is more than what is healthy for you.  · Work with your doctor to set a weight-loss goal.  · Get regular exercise as told by your doctor.  This information is not intended to replace advice given to you by your health care provider. Make sure you discuss any questions you have with your health care provider.  Document Revised: 08/22/2019 Document Reviewed: 08/22/2019  [employer] Patient Education © 2021 [employer]

## 2021-11-10 NOTE — PROGRESS NOTES
Subjective   Jaquan Carlson is a 51 y.o. female.     Pt presents to the clinic for follow up to lab work and for weight loss management. Pt reports having had 16lb weight loss in the past month which has lessened her GI and GERD symptoms dramatically. Pt states that she is feeling better.       The following portions of the patient's history were reviewed and updated as appropriate: allergies, current medications, past family history, past medical history, past social history, past surgical history and problem list.    Review of Systems   All other systems reviewed and are negative.    See history of Present Illness     Objective     Physical Exam   Constitutional: She appears well-developed and well-nourished.   HENT:   Head: Normocephalic.   Nose: Nose normal.   Eyes: Pupils are equal, round, and reactive to light.   Neck: Neck normal appearance.  Pulmonary/Chest: Effort normal.   Abdominal: Abdomen appears normal.   Musculoskeletal: Normal range of motion.   Neurological: She is alert.   Psychiatric: She has a normal mood and affect.       No flowsheet data found.    Patient's Body mass index is 33.3 kg/m². indicating that she is obese (BMI >30). Obesity-related health conditions include the following: hypertension, dyslipidemias and GERD. Obesity is improving with treatment. BMI is is above average; BMI management plan is completed. We discussed portion control and increasing exercise..   (Normal BMI:  18.5-24.9, OW 25-29.9, Obesity 30 or greater)      Assessment/Plan     Problems Addressed this Visit     None      Visit Diagnoses     Dyslipidemia    -  Primary    Relevant Medications    Omega-3 Fatty Acids (fish oil) 1000 MG capsule capsule    Hypothyroidism, unspecified type        Relevant Medications    levothyroxine (Synthroid) 50 MCG tablet    Class 1 obesity with body mass index (BMI) of 33.0 to 33.9 in adult, unspecified obesity type, unspecified whether serious comorbidity present        Relevant  Medications    Omega-3 Fatty Acids (fish oil) 1000 MG capsule capsule    Decreased GFR        Relevant Orders    Ambulatory Referral to Nephrology      Diagnoses       Codes Comments    Dyslipidemia    -  Primary ICD-10-CM: E78.5  ICD-9-CM: 272.4     Hypothyroidism, unspecified type     ICD-10-CM: E03.9  ICD-9-CM: 244.9     Class 1 obesity with body mass index (BMI) of 33.0 to 33.9 in adult, unspecified obesity type, unspecified whether serious comorbidity present     ICD-10-CM: E66.9, Z68.33  ICD-9-CM: 278.00, V85.33     Decreased GFR     ICD-10-CM: R94.4  ICD-9-CM: 794.4           You have chosen to receive care through a telephone visit. Do you consent to use a telephone visit for your medical care today? Yes    This visit has been rescheduled as a phone visit to comply with patient safety concerns in accordance with CDC recommendations. Total time of discussion was 15 minutes.  Lab work discussed with patient. Will be starting levothyroxine related to increased TSH production and weight gain. Also referring to nephrology for evaluation of decreased GFR and elevated creatinine.               This document has been electronically signed by PAVAN Rojas  November 10, 2021 09:01 EST    Part of this note may be an electronic transcription/translation of spoken language to printed text using the Dragon Dictation System.

## 2021-11-11 RX ORDER — LISINOPRIL 10 MG/1
10 TABLET ORAL DAILY
Qty: 30 TABLET | Refills: 0 | Status: SHIPPED | OUTPATIENT
Start: 2021-11-11 | End: 2021-12-06 | Stop reason: SDUPTHER

## 2021-12-06 ENCOUNTER — TELEMEDICINE (OUTPATIENT)
Dept: FAMILY MEDICINE CLINIC | Age: 51
End: 2021-12-06

## 2021-12-06 VITALS — HEIGHT: 64 IN | BODY MASS INDEX: 32.44 KG/M2 | WEIGHT: 190 LBS

## 2021-12-06 DIAGNOSIS — I10 HYPERTENSION, UNSPECIFIED TYPE: Primary | ICD-10-CM

## 2021-12-06 DIAGNOSIS — Z76.0 MEDICATION REFILL: ICD-10-CM

## 2021-12-06 DIAGNOSIS — R63.5 UNINTENDED WEIGHT GAIN: ICD-10-CM

## 2021-12-06 DIAGNOSIS — I10 ESSENTIAL (PRIMARY) HYPERTENSION: ICD-10-CM

## 2021-12-06 DIAGNOSIS — E03.9 HYPOTHYROIDISM, UNSPECIFIED TYPE: ICD-10-CM

## 2021-12-06 PROCEDURE — 99214 OFFICE O/P EST MOD 30 MIN: CPT | Performed by: NURSE PRACTITIONER

## 2021-12-06 RX ORDER — LEVOTHYROXINE SODIUM 0.05 MG/1
50 TABLET ORAL DAILY
Qty: 90 TABLET | Refills: 0 | Status: SHIPPED | OUTPATIENT
Start: 2021-12-06 | End: 2022-05-05 | Stop reason: SDUPTHER

## 2021-12-06 RX ORDER — PHENTERMINE HYDROCHLORIDE 37.5 MG/1
37.5 CAPSULE ORAL EVERY MORNING
Qty: 30 CAPSULE | Refills: 0 | Status: SHIPPED | OUTPATIENT
Start: 2021-12-06 | End: 2022-01-14 | Stop reason: SDUPTHER

## 2021-12-06 RX ORDER — LISINOPRIL 10 MG/1
10 TABLET ORAL DAILY
Qty: 90 TABLET | Refills: 0 | Status: SHIPPED | OUTPATIENT
Start: 2021-12-06 | End: 2022-02-03 | Stop reason: SDUPTHER

## 2021-12-06 NOTE — PROGRESS NOTES
Subjective   Jaquan Carlson is a 51 y.o. female.     Patient presents to the clinic via telehealth for med refills and for monthly appointment for medically assisted weight loss.  Patient's weight loss for the month is -4 pound.  BMI has decreased from 33.3-32.6.  No adverse reactions from weight loss medication or newly prescribed levothyroxine.  Patient states she is feeling better, more energized, and having less GI disturbance with the new changes to her daily routine diet and self-care habits.  Denies acute illness or further current concerns.       The following portions of the patient's history were reviewed and updated as appropriate: allergies, current medications, past family history, past medical history, past social history, past surgical history and problem list.    Review of Systems   Constitutional: Negative.    HENT: Negative.    Eyes: Negative.    Respiratory: Negative.    Gastrointestinal: Negative.    Genitourinary: Negative.    Skin: Negative.    Allergic/Immunologic: Negative.    Psychiatric/Behavioral: Negative.    All other systems reviewed and are negative.    See history of Present Illness     Objective     Physical Exam   Constitutional: She appears well-developed and well-nourished.   HENT:   Head: Normocephalic.   Nose: Nose normal.   Eyes: Pupils are equal, round, and reactive to light.   Neck: Neck normal appearance.  Pulmonary/Chest: Effort normal.   Abdominal: Abdomen appears normal.   Musculoskeletal: Normal range of motion.   Neurological: She is alert.   Skin: Skin is dry.   Psychiatric: She has a normal mood and affect.       No flowsheet data found.    Patient's Body mass index is 32.61 kg/m². indicating that she is obese (BMI >30). Obesity-related health conditions include the following: hypertension, dyslipidemias and GERD. Obesity is improving with treatment. BMI is is above average; BMI management plan is completed. We discussed portion control, increasing exercise and  pharmacologic options including adipex..   (Normal BMI:  18.5-24.9, OW 25-29.9, Obesity 30 or greater)      Assessment/Plan     Problems Addressed this Visit        Cardiac and Vasculature    Hypertensive disorder - Primary       Endocrine and Metabolic    Hypothyroidism       Other    Body mass index (BMI) 32.0-32.9, adult      Other Visit Diagnoses     Medication refill        Unintended weight gain        Body mass index (BMI) of 36.0 to 36.9 in adult        Essential (primary) hypertension          Diagnoses       Codes Comments    Hypertension, unspecified type    -  Primary ICD-10-CM: I10  ICD-9-CM: 401.9     Body mass index (BMI) 32.0-32.9, adult     ICD-10-CM: Z68.32  ICD-9-CM: V85.32     Hypothyroidism, unspecified type     ICD-10-CM: E03.9  ICD-9-CM: 244.9     Medication refill     ICD-10-CM: Z76.0  ICD-9-CM: V68.1     Unintended weight gain     ICD-10-CM: R63.5  ICD-9-CM: 783.1     Body mass index (BMI) of 36.0 to 36.9 in adult     ICD-10-CM: Z68.36  ICD-9-CM: V85.36     Essential (primary) hypertension     ICD-10-CM: I10  ICD-9-CM: 401.9           You have chosen to receive care through a telephone visit. Do you consent to use a telephone visit for your medical care today? Yes    This visit has been rescheduled as a phone visit to comply with patient safety concerns in accordance with CDC recommendations. Total time of discussion was 10 minutes.                 This document has been electronically signed by PAVAN Rojas  December 6, 2021 09:34 EST    Part of this note may be an electronic transcription/translation of spoken language to printed text using the Dragon Dict[employer] System.

## 2021-12-06 NOTE — PATIENT INSTRUCTIONS
"BMI for Adults  What is BMI?  Body mass index (BMI) is a number that is calculated from a person's weight and height. BMI can help estimate how much of a person's weight is composed of fat. BMI does not measure body fat directly. Rather, it is an alternative to procedures that directly measure body fat, which can be difficult and expensive.  BMI can help identify people who may be at higher risk for certain medical problems.  What are BMI measurements used for?  BMI is used as a screening tool to identify possible weight problems. It helps determine whether a person is obese, overweight, a healthy weight, or underweight.  BMI is useful for:  · Identifying a weight problem that may be related to a medical condition or may increase the risk for medical problems.  · Promoting changes, such as changes in diet and exercise, to help reach a healthy weight. BMI screening can be repeated to see if these changes are working.  How is BMI calculated?  BMI involves measuring your weight in relation to your height. Both height and weight are measured, and the BMI is calculated from those numbers. This can be done either in English ([place] or metric measurements. Note that charts and online BMI calculators are available to help you find your BMI quickly and easily without having to do these calculations yourself.  To calculate your BMI in English ([place] measurements:    1. Measure your weight in pounds (lb).  2. Multiply the number of pounds by 703.  ? For example, for a person who weighs 180 lb, multiply that number by 703, which equals 126,540.  3. Measure your height in inches. Then multiply that number by itself to get a measurement called \"inches squared.\"  ? For example, for a person who is 70 inches tall, the \"inches squared\" measurement is 70 inches x 70 inches, which equals 4,900 inches squared.  4. Divide the total from step 2 (number of lb x 703) by the total from step 3 (inches squared): 126,540 ÷ 4,900 = 25.8. This is " "your BMI.    To calculate your BMI in metric measurements:  1. Measure your weight in kilograms (kg).  2. Measure your height in meters (m). Then multiply that number by itself to get a measurement called \"meters squared.\"  ? For example, for a person who is 1.75 m tall, the \"meters squared\" measurement is 1.75 m x 1.75 m, which is equal to 3.1 meters squared.  3. Divide the number of kilograms (your weight) by the meters squared number. In this example: 70 ÷ 3.1 = 22.6. This is your BMI.  What do the results mean?  BMI charts are used to identify whether you are underweight, normal weight, overweight, or obese. The following guidelines will be used:  · Underweight: BMI less than 18.5.  · Normal weight: BMI between 18.5 and 24.9.  · Overweight: BMI between 25 and 29.9.  · Obese: BMI of 30 or above.  Keep these notes in mind:  · Weight includes both fat and muscle, so someone with a muscular build, such as an athlete, may have a BMI that is higher than 24.9. In cases like these, BMI is not an accurate measure of body fat.  · To determine if excess body fat is the cause of a BMI of 25 or higher, further assessments may need to be done by a health care provider.  · BMI is usually interpreted in the same way for men and women.  Where to find more information  For more information about BMI, including tools to quickly calculate your BMI, go to these websites:  · Centers for Disease Control and Prevention: www.cdc.gov  · American Heart Association: www.heart.org  · National Heart, Lung, and Blood [place]: www.nhlbi.nih.gov  Summary  · Body mass index (BMI) is a number that is calculated from a person's weight and height.  · BMI may help estimate how much of a person's weight is composed of fat. BMI can help identify those who may be at higher risk for certain medical problems.  · BMI can be measured using English measurements or metric measurements.  · BMI charts are used to identify whether you are underweight, normal " weight, overweight, or obese.  This information is not intended to replace advice given to you by your health care provider. Make sure you discuss any questions you have with your health care provider.  Document Revised: 09/09/2020 Document Reviewed: 07/17/2020  [employer] Patient Education © 2021 [employer]    Hypertension, Adult  Hypertension is another name for high blood pressure. High blood pressure forces your heart to work harder to pump blood. This can cause problems over time.  There are two numbers in a blood pressure reading. There is a top number (systolic) over a bottom number (diastolic). It is best to have a blood pressure that is below 120/80. Healthy choices can help lower your blood pressure, or you may need medicine to help lower it.  What are the causes?  The cause of this condition is not known. Some conditions may be related to high blood pressure.  What increases the risk?  · Smoking.  · Having type 2 diabetes mellitus, high cholesterol, or both.  · Not getting enough exercise or physical activity.  · Being overweight.  · Having too much fat, sugar, calories, or salt (sodium) in your diet.  · Drinking too much alcohol.  · Having long-term (chronic) kidney disease.  · Having a family history of high blood pressure.  · Age. Risk increases with age.  · Race. You may be at higher risk if you are .  · Gender. Men are at higher risk than women before age 45. After age 65, women are at higher risk than men.  · Having obstructive sleep apnea.  · Stress.  What are the signs or symptoms?  · High blood pressure may not cause symptoms. Very high blood pressure (hypertensive crisis) may cause:  ? Headache.  ? Feelings of worry or nervousness (anxiety).  ? Shortness of breath.  ? Nosebleed.  ? A feeling of being sick to your stomach (nausea).  ? Throwing up (vomiting).  ? Changes in how you see.  ? Very bad chest pain.  ? Seizures.  How is this treated?  · This condition is treated by  making healthy lifestyle changes, such as:  ? Eating healthy foods.  ? Exercising more.  ? Drinking less alcohol.  · Your health care provider may prescribe medicine if lifestyle changes are not enough to get your blood pressure under control, and if:  ? Your top number is above 130.  ? Your bottom number is above 80.  · Your personal target blood pressure may vary.  Follow these instructions at home:  Eating and drinking    · If told, follow the DASH eating plan. To follow this plan:  ? Fill one half of your plate at each meal with fruits and vegetables.  ? Fill one fourth of your plate at each meal with whole grains. Whole grains include whole-wheat pasta, brown rice, and whole-grain bread.  ? Eat or drink low-fat dairy products, such as skim milk or low-fat yogurt.  ? Fill one fourth of your plate at each meal with low-fat (lean) proteins. Low-fat proteins include fish, chicken without skin, eggs, beans, and tofu.  ? Avoid fatty meat, cured and processed meat, or chicken with skin.  ? Avoid pre-made or processed food.  · Eat less than 1,500 mg of salt each day.  · Do not drink alcohol if:  ? Your doctor tells you not to drink.  ? You are pregnant, may be pregnant, or are planning to become pregnant.  · If you drink alcohol:  ? Limit how much you use to:  § 0-1 drink a day for women.  § 0-2 drinks a day for men.  ? Be aware of how much alcohol is in your drink. In the [place] one drink equals one 12 oz bottle of beer (355 mL), one 5 oz glass of wine (148 mL), or one 1½ oz glass of hard liquor (44 mL).    Lifestyle    · Work with your doctor to stay at a healthy weight or to lose weight. Ask your doctor what the best weight is for you.  · Get at least 30 minutes of exercise most days of the week. This may include walking, swimming, or biking.  · Get at least 30 minutes of exercise that strengthens your muscles (resistance exercise) at least 3 days a week. This may include lifting weights or doing Pilates.  · Do not  use any products that contain nicotine or tobacco, such as cigarettes, e-cigarettes, and chewing tobacco. If you need help quitting, ask your doctor.  · Check your blood pressure at home as told by your doctor.  · Keep all follow-up visits as told by your doctor. This is important.    Medicines  · Take over-the-counter and prescription medicines only as told by your doctor. Follow directions carefully.  · Do not skip doses of blood pressure medicine. The medicine does not work as well if you skip doses. Skipping doses also puts you at risk for problems.  · Ask your doctor about side effects or reactions to medicines that you should watch for.  Contact a doctor if you:  · Think you are having a reaction to the medicine you are taking.  · Have headaches that keep coming back (recurring).  · Feel dizzy.  · Have swelling in your ankles.  · Have trouble with your vision.  Get help right away if you:  · Get a very bad headache.  · Start to feel mixed up (confused).  · Feel weak or numb.  · Feel faint.  · Have very bad pain in your:  ? Chest.  ? Belly (abdomen).  · Throw up more than once.  · Have trouble breathing.  Summary  · Hypertension is another name for high blood pressure.  · High blood pressure forces your heart to work harder to pump blood.  · For most people, a normal blood pressure is less than 120/80.  · Making healthy choices can help lower blood pressure. If your blood pressure does not get lower with healthy choices, you may need to take medicine.  This information is not intended to replace advice given to you by your health care provider. Make sure you discuss any questions you have with your health care provider.  Document Revised: 08/28/2019 Document Reviewed: 08/28/2019  [employer] Patient Education © 2021 [employer]    Hypothyroidism    Hypothyroidism is when the thyroid gland does not make enough of certain hormones (it is underactive). The thyroid gland is a small gland located in the lower front  part of the neck, just in front of the windpipe (trachea). This gland makes hormones that help control how the body uses food for energy (metabolism) as well as how the heart and brain function. These hormones also play a role in keeping your bones strong. When the thyroid is underactive, it produces too little of the hormones thyroxine (T4) and triiodothyronine (T3).  What are the causes?  This condition may be caused by:  · Hashimoto's disease. This is a disease in which the body's disease-fighting system (immune system) attacks the thyroid gland. This is the most common cause.  · Viral infections.  · Pregnancy.  · Certain medicines.  · Birth defects.  · Past radiation treatments to the head or neck for cancer.  · Past treatment with radioactive iodine.  · Past exposure to radiation in the environment.  · Past surgical removal of part or all of the thyroid.  · Problems with a gland in the center of the brain (pituitary gland).  · Lack of enough iodine in the diet.  What increases the risk?  You are more likely to develop this condition if:  · You are female.  · You have a family history of thyroid conditions.  · You use a medicine called lithium.  · You take medicines that affect the immune system (immunosuppressants).  What are the signs or symptoms?  Symptoms of this condition include:  · Feeling as though you have no energy (lethargy).  · Not being able to tolerate cold.  · Weight gain that is not explained by a change in diet or exercise habits.  · Lack of appetite.  · Dry skin.  · Coarse hair.  · Menstrual irregularity.  · Slowing of thought processes.  · Constipation.  · Sadness or depression.  How is this diagnosed?  This condition may be diagnosed based on:  · Your symptoms, your medical history, and a physical exam.  · Blood tests.  You may also have imaging tests, such as an ultrasound or MRI.  How is this treated?  This condition is treated with medicine that replaces the thyroid hormones that your body  does not make. After you begin treatment, it may take several weeks for symptoms to go away.  Follow these instructions at home:  · Take over-the-counter and prescription medicines only as told by your health care provider.  · If you start taking any new medicines, tell your health care provider.  · Keep all follow-up visits as told by your health care provider. This is important.  ? As your condition improves, your dosage of thyroid hormone medicine may change.  ? You will need to have blood tests regularly so that your health care provider can monitor your condition.  Contact a health care provider if:  · Your symptoms do not get better with treatment.  · You are taking thyroid replacement medicine and you:  ? Sweat a lot.  ? Have tremors.  ? Feel anxious.  ? Lose weight rapidly.  ? Cannot tolerate heat.  ? Have emotional swings.  ? Have diarrhea.  ? Feel weak.  Get help right away if you have:  · Chest pain.  · An irregular heartbeat.  · A rapid heartbeat.  · Difficulty breathing.  Summary  · Hypothyroidism is when the thyroid gland does not make enough of certain hormones (it is underactive).  · When the thyroid is underactive, it produces too little of the hormones thyroxine (T4) and triiodothyronine (T3).  · The most common cause is Hashimoto's disease, a disease in which the body's disease-fighting system (immune system) attacks the thyroid gland. The condition can also be caused by viral infections, medicine, pregnancy, or past radiation treatment to the head or neck.  · Symptoms may include weight gain, dry skin, constipation, feeling as though you do not have energy, and not being able to tolerate cold.  · This condition is treated with medicine to replace the thyroid hormones that your body does not make.  This information is not intended to replace advice given to you by your health care provider. Make sure you discuss any questions you have with your health care provider.  Document Revised: 11/30/2018  Document Reviewed: 11/28/2018  [employer] Patient Education © 2021 [employer]    Managing Your Hypertension  Hypertension, also called high blood pressure, is when the force of the blood pressing against the walls of the arteries is too strong. Arteries are blood vessels that carry blood from your heart throughout your body. Hypertension forces the heart to work harder to pump blood and may cause the arteries to become narrow or stiff.  Understanding blood pressure readings  Your personal target blood pressure may vary depending on your medical conditions, your age, and other factors. A blood pressure reading includes a higher number over a lower number. Ideally, your blood pressure should be below 120/80. You should know that:  · The first, or top, number is called the systolic pressure. It is a measure of the pressure in your arteries as your heart beats.  · The second, or bottom number, is called the diastolic pressure. It is a measure of the pressure in your arteries as the heart relaxes.  Blood pressure is classified into four stages. Based on your blood pressure reading, your health care provider may use the following stages to determine what type of treatment you need, if any. Systolic pressure and diastolic pressure are measured in a unit called mmHg.  Normal  · Systolic pressure: below 120.  · Diastolic pressure: below 80.  Elevated  · Systolic pressure: 120-129.  · Diastolic pressure: below 80.  Hypertension stage 1  · Systolic pressure: 130-139.  · Diastolic pressure: 80-89.  Hypertension stage 2  · Systolic pressure: 140 or above.  · Diastolic pressure: 90 or above.  How can this condition affect me?  Managing your hypertension is an important responsibility. Over time, hypertension can damage the arteries and decrease blood flow to important parts of the body, including the brain, heart, and kidneys. Having untreated or uncontrolled hypertension can lead to:  · A heart attack.  · A stroke.  · A  weakened blood vessel (aneurysm).  · Heart failure.  · Kidney damage.  · Eye damage.  · Metabolic syndrome.  · Memory and concentration problems.  · Vascular dementia.  What actions can I take to manage this condition?  Hypertension can be managed by making lifestyle changes and possibly by taking medicines. Your health care provider will help you make a plan to bring your blood pressure within a normal range.  Nutrition    · Eat a diet that is high in fiber and potassium, and low in salt (sodium), added sugar, and fat. An example eating plan is called the Dietary Approaches to Stop Hypertension (DASH) diet. To eat this way:  ? Eat plenty of fresh fruits and vegetables. Try to fill one-half of your plate at each meal with fruits and vegetables.  ? Eat whole grains, such as whole-wheat pasta, brown rice, or whole-grain bread. Fill about one-fourth of your plate with whole grains.  ? Eat low-fat dairy products.  ? Avoid fatty cuts of meat, processed or cured meats, and poultry with skin. Fill about one-fourth of your plate with lean proteins such as fish, chicken without skin, beans, eggs, and tofu.  ? Avoid pre-made and processed foods. These tend to be higher in sodium, added sugar, and fat.  · Reduce your daily sodium intake. Most people with hypertension should eat less than 1,500 mg of sodium a day.    Lifestyle    · Work with your health care provider to maintain a healthy body weight or to lose weight. Ask what an ideal weight is for you.  · Get at least 30 minutes of exercise that causes your heart to beat faster (aerobic exercise) most days of the week. Activities may include walking, swimming, or biking.  · Include exercise to strengthen your muscles (resistance exercise), such as weight lifting, as part of your weekly exercise routine. Try to do these types of exercises for 30 minutes at least 3 days a week.  · Do not use any products that contain nicotine or tobacco, such as cigarettes, e-cigarettes, and  chewing tobacco. If you need help quitting, ask your health care provider.  · Control any long-term (chronic) conditions you have, such as high cholesterol or diabetes.  · Identify your sources of stress and find ways to manage stress. This may include meditation, deep breathing, or making time for fun activities.    Alcohol use  · Do not drink alcohol if:  ? Your health care provider tells you not to drink.  ? You are pregnant, may be pregnant, or are planning to become pregnant.  · If you drink alcohol:  ? Limit how much you use to:  § 0-1 drink a day for women.  § 0-2 drinks a day for men.  ? Be aware of how much alcohol is in your drink. In the [place] one drink equals one 12 oz bottle of beer (355 mL), one 5 oz glass of wine (148 mL), or one 1½ oz glass of hard liquor (44 mL).  Medicines  Your health care provider may prescribe medicine if lifestyle changes are not enough to get your blood pressure under control and if:  · Your systolic blood pressure is 130 or higher.  · Your diastolic blood pressure is 80 or higher.  Take medicines only as told by your health care provider. Follow the directions carefully. Blood pressure medicines must be taken as told by your health care provider. The medicine does not work as well when you skip doses. Skipping doses also puts you at risk for problems.  Monitoring  Before you monitor your blood pressure:  · Do not smoke, drink caffeinated beverages, or exercise within 30 minutes before taking a measurement.  · Use the bathroom and empty your bladder (urinate).  · Sit quietly for at least 5 minutes before taking measurements.  Monitor your blood pressure at home as told by your health care provider. To do this:  · Sit with your back straight and supported.  · Place your feet flat on the floor. Do not cross your legs.  · Support your arm on a flat surface, such as a table. Make sure your upper arm is at heart level.  · Each time you measure, take two or three readings one minute  apart and record the results.  You may also need to have your blood pressure checked regularly by your health care provider.  General information  · Talk with your health care provider about your diet, exercise habits, and other lifestyle factors that may be contributing to hypertension.  · Review all the medicines you take with your health care provider because there may be side effects or interactions.  · Keep all visits as told by your health care provider. Your health care provider can help you create and adjust your plan for managing your high blood pressure.  Where to find more information  · National Heart, Lung, and Blood [place]: www.nhlbi.nih.gov  · American Heart Association: www.heart.org  Contact a health care provider if:  · You think you are having a reaction to medicines you have taken.  · You have repeated (recurrent) headaches.  · You feel dizzy.  · You have swelling in your ankles.  · You have trouble with your vision.  Get help right away if:  · You develop a severe headache or confusion.  · You have unusual weakness or numbness, or you feel faint.  · You have severe pain in your chest or abdomen.  · You vomit repeatedly.  · You have trouble breathing.  These symptoms may represent a serious problem that is an emergency. Do not wait to see if the symptoms will go away. Get medical help right away. Call your local emergency services (911 in the [place] Do not drive yourself to the hospital.  Summary  · Hypertension is when the force of blood pumping through your arteries is too strong. If this condition is not controlled, it may put you at risk for serious complications.  · Your personal target blood pressure may vary depending on your medical conditions, your age, and other factors. For most people, a normal blood pressure is less than 120/80.  · Hypertension is managed by lifestyle changes, medicines, or both.  · Lifestyle changes to help manage hypertension include losing weight, eating a  healthy, low-sodium diet, exercising more, stopping smoking, and limiting alcohol.  This information is not intended to replace advice given to you by your health care provider. Make sure you discuss any questions you have with your health care provider.  Document Revised: 01/22/2021 Document Reviewed: 11/17/2020  [employer] Patient Education © 2021 [employer]

## 2021-12-30 ENCOUNTER — TELEPHONE (OUTPATIENT)
Dept: PSYCHIATRY | Facility: CLINIC | Age: 51
End: 2021-12-30

## 2022-01-13 ENCOUNTER — TELEMEDICINE (OUTPATIENT)
Dept: FAMILY MEDICINE CLINIC | Age: 52
End: 2022-01-13

## 2022-01-13 DIAGNOSIS — Z76.0 MEDICATION REFILL: ICD-10-CM

## 2022-01-13 DIAGNOSIS — R63.5 UNINTENDED WEIGHT GAIN: ICD-10-CM

## 2022-01-13 PROCEDURE — 99442 PR PHYS/QHP TELEPHONE EVALUATION 11-20 MIN: CPT | Performed by: NURSE PRACTITIONER

## 2022-01-14 VITALS — BODY MASS INDEX: 31.92 KG/M2 | HEIGHT: 64 IN | WEIGHT: 187 LBS

## 2022-01-14 RX ORDER — PHENTERMINE HYDROCHLORIDE 37.5 MG/1
37.5 CAPSULE ORAL EVERY MORNING
Qty: 30 CAPSULE | Refills: 0 | Status: SHIPPED | OUTPATIENT
Start: 2022-01-14 | End: 2022-02-17 | Stop reason: SDUPTHER

## 2022-02-03 DIAGNOSIS — I10 ESSENTIAL (PRIMARY) HYPERTENSION: ICD-10-CM

## 2022-02-03 DIAGNOSIS — Z76.0 MEDICATION REFILL: ICD-10-CM

## 2022-02-03 RX ORDER — LISINOPRIL 10 MG/1
10 TABLET ORAL DAILY
Qty: 90 TABLET | Refills: 0 | Status: SHIPPED | OUTPATIENT
Start: 2022-02-03 | End: 2022-06-12 | Stop reason: SDUPTHER

## 2022-02-16 DIAGNOSIS — E78.5 DYSLIPIDEMIA: ICD-10-CM

## 2022-02-16 DIAGNOSIS — R94.4 DECREASED GFR: ICD-10-CM

## 2022-02-16 DIAGNOSIS — E03.9 HYPOTHYROIDISM, UNSPECIFIED TYPE: Primary | ICD-10-CM

## 2022-02-17 ENCOUNTER — TELEMEDICINE (OUTPATIENT)
Dept: FAMILY MEDICINE CLINIC | Age: 52
End: 2022-02-17

## 2022-02-17 VITALS — HEIGHT: 64 IN | BODY MASS INDEX: 32.27 KG/M2 | WEIGHT: 189 LBS

## 2022-02-17 DIAGNOSIS — R63.5 UNINTENDED WEIGHT GAIN: ICD-10-CM

## 2022-02-17 DIAGNOSIS — Z76.0 MEDICATION REFILL: ICD-10-CM

## 2022-02-17 PROCEDURE — 99213 OFFICE O/P EST LOW 20 MIN: CPT | Performed by: NURSE PRACTITIONER

## 2022-02-17 RX ORDER — PHENTERMINE HYDROCHLORIDE 37.5 MG/1
37.5 CAPSULE ORAL EVERY MORNING
Qty: 30 CAPSULE | Refills: 0 | Status: SHIPPED | OUTPATIENT
Start: 2022-02-17 | End: 2022-03-31 | Stop reason: SDUPTHER

## 2022-02-18 NOTE — PATIENT INSTRUCTIONS
"BMI for Adults  What is BMI?  Body mass index (BMI) is a number that is calculated from a person's weight and height. BMI can help estimate how much of a person's weight is composed of fat. BMI does not measure body fat directly. Rather, it is an alternative to procedures that directly measure body fat, which can be difficult and expensive.  BMI can help identify people who may be at higher risk for certain medical problems.  What are BMI measurements used for?  BMI is used as a screening tool to identify possible weight problems. It helps determine whether a person is obese, overweight, a healthy weight, or underweight.  BMI is useful for:  · Identifying a weight problem that may be related to a medical condition or may increase the risk for medical problems.  · Promoting changes, such as changes in diet and exercise, to help reach a healthy weight. BMI screening can be repeated to see if these changes are working.  How is BMI calculated?  BMI involves measuring your weight in relation to your height. Both height and weight are measured, and the BMI is calculated from those numbers. This can be done either in English ([place] or metric measurements. Note that charts and online BMI calculators are available to help you find your BMI quickly and easily without having to do these calculations yourself.  To calculate your BMI in English ([place] measurements:    1. Measure your weight in pounds (lb).  2. Multiply the number of pounds by 703.  ? For example, for a person who weighs 180 lb, multiply that number by 703, which equals 126,540.  3. Measure your height in inches. Then multiply that number by itself to get a measurement called \"inches squared.\"  ? For example, for a person who is 70 inches tall, the \"inches squared\" measurement is 70 inches x 70 inches, which equals 4,900 inches squared.  4. Divide the total from step 2 (number of lb x 703) by the total from step 3 (inches squared): 126,540 ÷ 4,900 = 25.8. This is " "your BMI.    To calculate your BMI in metric measurements:  1. Measure your weight in kilograms (kg).  2. Measure your height in meters (m). Then multiply that number by itself to get a measurement called \"meters squared.\"  ? For example, for a person who is 1.75 m tall, the \"meters squared\" measurement is 1.75 m x 1.75 m, which is equal to 3.1 meters squared.  3. Divide the number of kilograms (your weight) by the meters squared number. In this example: 70 ÷ 3.1 = 22.6. This is your BMI.  What do the results mean?  BMI charts are used to identify whether you are underweight, normal weight, overweight, or obese. The following guidelines will be used:  · Underweight: BMI less than 18.5.  · Normal weight: BMI between 18.5 and 24.9.  · Overweight: BMI between 25 and 29.9.  · Obese: BMI of 30 or above.  Keep these notes in mind:  · Weight includes both fat and muscle, so someone with a muscular build, such as an athlete, may have a BMI that is higher than 24.9. In cases like these, BMI is not an accurate measure of body fat.  · To determine if excess body fat is the cause of a BMI of 25 or higher, further assessments may need to be done by a health care provider.  · BMI is usually interpreted in the same way for men and women.  Where to find more information  For more information about BMI, including tools to quickly calculate your BMI, go to these websites:  · Centers for Disease Control and Prevention: www.cdc.gov  · American Heart Association: www.heart.org  · National Heart, Lung, and Blood [place]: www.nhlbi.nih.gov  Summary  · Body mass index (BMI) is a number that is calculated from a person's weight and height.  · BMI may help estimate how much of a person's weight is composed of fat. BMI can help identify those who may be at higher risk for certain medical problems.  · BMI can be measured using English measurements or metric measurements.  · BMI charts are used to identify whether you are underweight, normal " weight, overweight, or obese.  This information is not intended to replace advice given to you by your health care provider. Make sure you discuss any questions you have with your health care provider.  Document Revised: 09/09/2020 Document Reviewed: 07/17/2020  Elsevier Patient Education © 2021 [employer]

## 2022-02-18 NOTE — PROGRESS NOTES
Subjective   Jaquan Carlson is a 51 y.o. female.     Patient presents to the clinic today for her monthly follow-up and check-in on medically assisted weight loss program.  Discussed adding abdominal circumference to monthly check-in's.  This will show reduction in abdominal circumference over time when on occasion there is little to no decrease in weight and reflects  positive progress.  Patient denies any side effects or problematic issues with weight loss medication.  José appropriate.       The following portions of the patient's history were reviewed and updated as appropriate: allergies, current medications, past family history, past medical history, past social history, past surgical history and problem list.    Review of Systems   All other systems reviewed and are negative.    See history of Present Illness     Objective     Physical Exam   Constitutional: She appears well-developed and well-nourished.   HENT:   Head: Normocephalic.   Eyes: Pupils are equal, round, and reactive to light.   Neck: Neck normal appearance.  Pulmonary/Chest: Effort normal.   Abdominal: Abdomen appears normal.   Musculoskeletal: Normal range of motion.   Neurological: She is alert.   Psychiatric: She has a normal mood and affect.       No flowsheet data found.    Patient's Body mass index is 32.44 kg/m². indicating that she is obese (BMI >30). Obesity-related health conditions include the following: hypertension and urinary stress incontinence. Obesity is unchanged. BMI is is above average; BMI management plan is completed. We discussed portion control, increasing exercise and pharmacologic options including adipex..   (Normal BMI:  18.5-24.9, OW 25-29.9, Obesity 30 or greater)      Assessment/Plan     Problems Addressed this Visit        Other    Body mass index (BMI) 32.0-32.9, adult    Relevant Medications    phentermine 37.5 MG capsule      Other Visit Diagnoses     Medication refill        Relevant Medications     phentermine 37.5 MG capsule    Unintended weight gain        Relevant Medications    phentermine 37.5 MG capsule      Diagnoses       Codes Comments    Body mass index (BMI) 32.0-32.9, adult     ICD-10-CM: Z68.32  ICD-9-CM: V85.32     Medication refill     ICD-10-CM: Z76.0  ICD-9-CM: V68.1     Unintended weight gain     ICD-10-CM: R63.5  ICD-9-CM: 783.1           You have chosen to receive care through a telephone visit. Do you consent to use a telephone visit for your medical care today? Yes    This visit has been rescheduled as a phone visit to comply with patient safety concerns in accordance with CDC recommendations. Total time of discussion was 10 minutes.                 This document has been electronically signed by PAVAN Rojas  February 18, 2022 10:26 EST    Part of this note may be an electronic transcription/translation of spoken language to printed text using the Dragon Dictation System.

## 2022-03-31 ENCOUNTER — TELEMEDICINE (OUTPATIENT)
Dept: FAMILY MEDICINE CLINIC | Age: 52
End: 2022-03-31

## 2022-03-31 VITALS — HEIGHT: 64 IN | BODY MASS INDEX: 31.76 KG/M2 | WEIGHT: 186 LBS

## 2022-03-31 DIAGNOSIS — I10 HYPERTENSION, UNSPECIFIED TYPE: ICD-10-CM

## 2022-03-31 DIAGNOSIS — R63.5 UNINTENDED WEIGHT GAIN: ICD-10-CM

## 2022-03-31 DIAGNOSIS — Z76.0 MEDICATION REFILL: ICD-10-CM

## 2022-03-31 DIAGNOSIS — E66.09 CLASS 1 OBESITY DUE TO EXCESS CALORIES WITH SERIOUS COMORBIDITY AND BODY MASS INDEX (BMI) OF 31.0 TO 31.9 IN ADULT: Primary | ICD-10-CM

## 2022-03-31 PROCEDURE — 99213 OFFICE O/P EST LOW 20 MIN: CPT | Performed by: NURSE PRACTITIONER

## 2022-03-31 RX ORDER — PHENTERMINE HYDROCHLORIDE 37.5 MG/1
37.5 CAPSULE ORAL EVERY MORNING
Qty: 30 CAPSULE | Refills: 0 | Status: SHIPPED | OUTPATIENT
Start: 2022-03-31 | End: 2022-05-02

## 2022-04-01 NOTE — PROGRESS NOTES
Subjective   Jaquan Carlson is a 51 y.o. female.     Patient presents to the clinic today for follow-up with medically assisted weight loss.  Patient down 3 pounds since last visit and 1 inch in the waist circumference.  Patient interested in the GLP-1 receptor agonist program particularly Wegovy.  Patient is a candidate with a BMI of 31.9 with comorbidities of hypertension.  Referral sent to specialty pharmacy for PA for Wegovy.         The following portions of the patient's history were reviewed and updated as appropriate: allergies, current medications, past family history, past medical history, past social history, past surgical history and problem list.    Review of Systems   All other systems reviewed and are negative.    See history of Present Illness     Objective     Physical Exam   Constitutional: She appears well-developed and well-nourished. She appears overweight.  Neck: Neck normal appearance.  Pulmonary/Chest: Effort normal.   Abdominal: Abdomen appears normal.   Musculoskeletal: Normal range of motion.   Neurological: She is alert.   Skin: Skin is warm and dry.   Psychiatric: She has a normal mood and affect.       No flowsheet data found.    Patient's Body mass index is 31.93 kg/m². indicating that she is obese (BMI >30). Obesity-related health conditions include the following: hypertension. Obesity is improving with treatment. BMI is is above average; BMI management plan is completed. We discussed portion control, increasing exercise and pharmacologic options including glp1 receptor agonist and adipex..   (Normal BMI:  18.5-24.9, OW 25-29.9, Obesity 30 or greater)      Assessment/Plan     Problems Addressed this Visit        Cardiac and Vasculature    Hypertensive disorder    Relevant Orders    Ambulatory Referral to Specialty Pharmacy       Other    Body mass index (BMI) 32.0-32.9, adult    Relevant Medications    phentermine 37.5 MG capsule      Other Visit Diagnoses     Class 1 obesity due to  excess calories with serious comorbidity and body mass index (BMI) of 31.0 to 31.9 in adult    -  Primary    Relevant Medications    phentermine 37.5 MG capsule    Other Relevant Orders    Ambulatory Referral to Specialty Pharmacy    Medication refill        Relevant Medications    phentermine 37.5 MG capsule    Unintended weight gain        Relevant Medications    phentermine 37.5 MG capsule      Diagnoses       Codes Comments    Class 1 obesity due to excess calories with serious comorbidity and body mass index (BMI) of 31.0 to 31.9 in adult    -  Primary ICD-10-CM: E66.09, Z68.31  ICD-9-CM: 278.00, V85.31     Hypertension, unspecified type     ICD-10-CM: I10  ICD-9-CM: 401.9     Body mass index (BMI) 32.0-32.9, adult     ICD-10-CM: Z68.32  ICD-9-CM: V85.32     Medication refill     ICD-10-CM: Z76.0  ICD-9-CM: V68.1     Unintended weight gain     ICD-10-CM: R63.5  ICD-9-CM: 783.1           You have chosen to receive care through a telephone visit. Do you consent to use a telephone visit for your medical care today? Yes    This visit has been rescheduled as a phone visit to comply with patient safety concerns in accordance with CDC recommendations. Total time of discussion was 10 minutes.                 This document has been electronically signed by PAVAN Rojas  April 1, 2022 07:52 EDT    Part of this note may be an electronic transcription/translation of spoken language to printed text using the Dragon Dictation System.

## 2022-05-02 ENCOUNTER — SPECIALTY PHARMACY (OUTPATIENT)
Dept: PHARMACY | Facility: HOSPITAL | Age: 52
End: 2022-05-02

## 2022-05-02 ENCOUNTER — DISEASE STATE MANAGEMENT VISIT (OUTPATIENT)
Dept: PHARMACY | Facility: HOSPITAL | Age: 52
End: 2022-05-02

## 2022-05-02 VITALS
DIASTOLIC BLOOD PRESSURE: 84 MMHG | HEART RATE: 81 BPM | BODY MASS INDEX: 34.57 KG/M2 | WEIGHT: 201.4 LBS | SYSTOLIC BLOOD PRESSURE: 137 MMHG

## 2022-05-02 RX ORDER — LORATADINE 10 MG/1
10 CAPSULE, LIQUID FILLED ORAL DAILY
COMMUNITY

## 2022-05-02 RX ORDER — SEMAGLUTIDE 1 MG/.5ML
1 INJECTION, SOLUTION SUBCUTANEOUS WEEKLY
Qty: 2 ML | Refills: 0 | Status: SHIPPED | OUTPATIENT
Start: 2022-05-02 | End: 2022-06-02

## 2022-05-02 NOTE — PROGRESS NOTES
[facility]  Weight Management Program        Jaquan Carlson is a 51 y.o. female referred to the [facility] by Adonis Erwin for clinical pharmacy and specialty pharmacy management of GLP-1 Receptor Agonists for weight management.  Jaquan Carlson has previously tried Saxenda, Adipex, and lifestyle changes for weight loss.  Current weight loss efforts include Saxenda 3mg SC daily and lifestyle efforts. The patient denies a person history or family history of thyroid cancer and denies a personal history of pancreatitis.     Relevant Past Medical History and Co-morbidities  Past Medical History:   Diagnosis Date   • Diarrhea    • Hypertensive disorder 10/7/2021   • Hypothyroidism 12/6/2021   • Umbilical hernia      Social History     Socioeconomic History   • Marital status:    Tobacco Use   • Smoking status: Never Smoker   • Smokeless tobacco: Never Used   Substance and Sexual Activity   • Alcohol use: No   • Drug use: No   • Sexual activity: Defer     Birth control/protection: Surgical       Allergies  Penicillins    Current Medication List    Current Outpatient Medications:   •  levothyroxine (Synthroid) 50 MCG tablet, Take 1 tablet by mouth Daily., Disp: 90 tablet, Rfl: 0  •  Liraglutide (SAXENDA) 18 MG/3ML injection pen, Inject 3MG by subcutaneous route every day in the abdomen, thigh, or upper arm for obesity, Disp: 15 mL, Rfl: 3  •  lisinopril (PRINIVIL,ZESTRIL) 10 MG tablet, Take 1 tablet by mouth Daily., Disp: 90 tablet, Rfl: 0  •  Loratadine (Claritin) 10 MG capsule, Take 10 mg by mouth Daily., Disp: , Rfl:   •  Omega-3 Fatty Acids (fish oil) 1000 MG capsule capsule, Take 1 capsule by mouth 2 (Two) Times a Day With Meals., Disp: 60 capsule, Rfl: 5  •  vitamin D (ERGOCALCIFEROL) 1.25 MG (42092 UT) capsule capsule, TAKE 1 CAPSULE BY MOUTH WEEKLY (Patient taking differently: Take 50,000 Units by mouth Every 30 (Thirty) Days.), Disp: 4 capsule, Rfl: 3    Drug  Interactions  None     Relevant Laboratory Values  Lab Results   Component Value Date    CHOL 223 (H) 10/19/2021    CHLPL 199 08/15/2014    TRIG 192 (H) 10/19/2021    HDL 44 10/19/2021     (H) 10/19/2021     There is no height or weight on file to calculate BMI.    Vaccinations:   Patient recommended to keep up with routine vaccinations.     Goals of Therapy  • Clinical Goals or Therapeutic Targets: 10% weight loss goal      Date 5/2/22       Weight (kg) 91.4 kg       BMI kg/ 34.6       Waist Circumference (in)  42 in           Medication Assessment & Plan    Patient's current BMI is 34.6, which is considered Class I Obestity    Will order Wegovy 1mg SC weekly. Discussed with provider starting at higher dose since she has tolerated Saxenda at maintenance dose well. Pt is wanting to start at higher dose. The following medications will need dose adjustments/closer monitoring once the GLP1 is started: levothyroxine     Discussed lifestyle modifications, including diet and exercise.  Patient education provided.     Worked with patient to create SMART Goal(s): Drink 64 oz of water everyday     Gema Villa, Michelle  5/2/2022  09:09 EDT

## 2022-05-02 NOTE — PROGRESS NOTES
[facility]  Weight Management Program        Jaquan Carlson is a 51 y.o. female referred to the [facility] by Adonis Erwin for clinical pharmacy and specialty pharmacy management of GLP-1 Receptor Agonists for weight management.  Jaquan Carlson has previously tried Saxenda, Adipex, and lifestyle changes for weight loss.  Current weight loss efforts include Saxenda 3mg SC daily and lifestyle efforts. The patient denies a person history or family history of thyroid cancer and denies a personal history of pancreatitis.     Relevant Past Medical History and Co-morbidities  Past Medical History:   Diagnosis Date   • Diarrhea    • Hypertensive disorder 10/7/2021   • Hypothyroidism 12/6/2021   • Umbilical hernia      Social History     Socioeconomic History   • Marital status:    Tobacco Use   • Smoking status: Never Smoker   • Smokeless tobacco: Never Used   Substance and Sexual Activity   • Alcohol use: No   • Drug use: No   • Sexual activity: Defer     Birth control/protection: Surgical       Allergies  Penicillins    Current Medication List    Current Outpatient Medications:   •  levothyroxine (Synthroid) 50 MCG tablet, Take 1 tablet by mouth Daily., Disp: 90 tablet, Rfl: 0  •  lisinopril (PRINIVIL,ZESTRIL) 10 MG tablet, Take 1 tablet by mouth Daily., Disp: 90 tablet, Rfl: 0  •  Loratadine (Claritin) 10 MG capsule, Take 10 mg by mouth Daily., Disp: , Rfl:   •  Omega-3 Fatty Acids (fish oil) 1000 MG capsule capsule, Take 1 capsule by mouth 2 (Two) Times a Day With Meals., Disp: 60 capsule, Rfl: 5  •  Semaglutide-Weight Management (Wegovy) 1 MG/0.5ML solution auto-injector, Inject 1 mg under the skin into the appropriate area as directed 1 (One) Time Per Week., Disp: 2 mL, Rfl: 0  •  vitamin D (ERGOCALCIFEROL) 1.25 MG (24878 UT) capsule capsule, TAKE 1 CAPSULE BY MOUTH WEEKLY (Patient taking differently: Take 50,000 Units by mouth Every 30 (Thirty) Days.), Disp: 4  capsule, Rfl: 3    Drug Interactions  None     Relevant Laboratory Values  Lab Results   Component Value Date    CHOL 223 (H) 10/19/2021    CHLPL 199 08/15/2014    TRIG 192 (H) 10/19/2021    HDL 44 10/19/2021     (H) 10/19/2021     There is no height or weight on file to calculate BMI.    Vaccinations:   Patient recommended to keep up with routine vaccinations.     Goals of Therapy  • Clinical Goals or Therapeutic Targets: 10% weight loss goal      Date 5/2/22       Weight (kg) 91.4 kg       BMI kg/ 34.6       Waist Circumference (in)  42 in           Medication Assessment & Plan    Patient's current BMI is 34.6, which is considered Class I Obestity    Will order Wegovy 1mg SC weekly. Discussed with provider starting at higher dose since she has tolerated Saxenda at maintenance dose well. Pt is wanting to start at higher dose. The following medications will need dose adjustments/closer monitoring once the GLP1 is started: levothyroxine     Discussed lifestyle modifications, including diet and exercise.  Patient education provided.     Worked with patient to create SMART Goal(s): Drink 64 oz of water everyday     Gema Villa, Michelle  5/2/2022  10:12 EDT

## 2022-05-05 DIAGNOSIS — Z76.0 MEDICATION REFILL: ICD-10-CM

## 2022-05-05 DIAGNOSIS — E03.9 HYPOTHYROIDISM, UNSPECIFIED TYPE: ICD-10-CM

## 2022-05-06 RX ORDER — LEVOTHYROXINE SODIUM 0.05 MG/1
50 TABLET ORAL DAILY
Qty: 90 TABLET | Refills: 0 | Status: SHIPPED | OUTPATIENT
Start: 2022-05-06 | End: 2022-08-03 | Stop reason: SDUPTHER

## 2022-05-26 DIAGNOSIS — E66.9 CLASS 1 OBESITY WITH BODY MASS INDEX (BMI) OF 33.0 TO 33.9 IN ADULT, UNSPECIFIED OBESITY TYPE, UNSPECIFIED WHETHER SERIOUS COMORBIDITY PRESENT: ICD-10-CM

## 2022-05-26 DIAGNOSIS — E78.5 DYSLIPIDEMIA: ICD-10-CM

## 2022-05-26 RX ORDER — CHLORAL HYDRATE 500 MG
1000 CAPSULE ORAL 2 TIMES DAILY WITH MEALS
Qty: 60 CAPSULE | Refills: 5 | Status: SHIPPED | OUTPATIENT
Start: 2022-05-26 | End: 2022-11-29 | Stop reason: SDUPTHER

## 2022-05-27 RX ORDER — SEMAGLUTIDE 1 MG/.5ML
1 INJECTION, SOLUTION SUBCUTANEOUS WEEKLY
Qty: 2 ML | Refills: 0 | Status: CANCELLED | OUTPATIENT
Start: 2022-05-27

## 2022-06-02 ENCOUNTER — DISEASE STATE MANAGEMENT VISIT (OUTPATIENT)
Dept: PHARMACY | Facility: HOSPITAL | Age: 52
End: 2022-06-02

## 2022-06-02 VITALS
DIASTOLIC BLOOD PRESSURE: 84 MMHG | SYSTOLIC BLOOD PRESSURE: 135 MMHG | HEART RATE: 94 BPM | BODY MASS INDEX: 32.72 KG/M2 | WEIGHT: 190.6 LBS

## 2022-06-02 RX ORDER — SEMAGLUTIDE 1.7 MG/.75ML
1.7 INJECTION, SOLUTION SUBCUTANEOUS WEEKLY
Qty: 3 ML | Refills: 0 | Status: SHIPPED | OUTPATIENT
Start: 2022-06-02 | End: 2022-06-30

## 2022-06-02 NOTE — PROGRESS NOTES
[facility]  Weight Management Program        Jaquan Carlson is a 51 y.o. female referred to the [facility] by Adonis Erwin for clinical pharmacy and specialty pharmacy management of GLP-1 Receptor Agonists for weight management.  Jaquan Carlson has previously tried Saxenda, Adipex, and lifestyle changes for weight loss.  Current weight loss efforts include Wegovy 1mg weekly and lifestyle efforts. The patient denies a person history or family history of thyroid cancer and denies a personal history of pancreatitis. The patient denies any side effects and has had no issues with self-administering the injection. She has been meeting her goal of drinking 64 ounces of water every day and getting in 10,000 steps per day.    Relevant Past Medical History and Co-morbidities  Past Medical History:   Diagnosis Date   • Diarrhea    • Hypertensive disorder 10/7/2021   • Hypothyroidism 12/6/2021   • Umbilical hernia      Social History     Socioeconomic History   • Marital status:    Tobacco Use   • Smoking status: Never Smoker   • Smokeless tobacco: Never Used   Substance and Sexual Activity   • Alcohol use: No   • Drug use: No   • Sexual activity: Defer     Birth control/protection: Surgical       Allergies  Penicillins    Current Medication List    Current Outpatient Medications:   •  levothyroxine (Synthroid) 50 MCG tablet, Take 1 tablet by mouth Daily., Disp: 90 tablet, Rfl: 0  •  lisinopril (PRINIVIL,ZESTRIL) 10 MG tablet, Take 1 tablet by mouth Daily., Disp: 90 tablet, Rfl: 0  •  Loratadine (Claritin) 10 MG capsule, Take 10 mg by mouth Daily., Disp: , Rfl:   •  Omega-3 Fatty Acids (fish oil) 1000 MG capsule capsule, Take 1 capsule by mouth 2 (Two) Times a Day With Meals., Disp: 60 capsule, Rfl: 5  •  Semaglutide-Weight Management (Wegovy) 1 MG/0.5ML solution auto-injector, Inject 1 mg under the skin into the appropriate area as directed 1 (One) Time Per Week., Disp: 2  mL, Rfl: 0  •  vitamin D (ERGOCALCIFEROL) 1.25 MG (98862 UT) capsule capsule, TAKE 1 CAPSULE BY MOUTH WEEKLY (Patient taking differently: Take 50,000 Units by mouth Every 30 (Thirty) Days.), Disp: 4 capsule, Rfl: 3    Drug Interactions  None     Relevant Laboratory Values  Lab Results   Component Value Date    CHOL 223 (H) 10/19/2021    CHLPL 199 08/15/2014    TRIG 192 (H) 10/19/2021    HDL 44 10/19/2021     (H) 10/19/2021     There is no height or weight on file to calculate BMI.    Vaccinations:   Patient recommended to keep up with routine vaccinations.     Goals of Therapy  • Clinical Goals or Therapeutic Targets: 10% weight loss goal      Date 5/2/22 6/2/22      Weight (kg) 91.4 kg  201.08lb 190.6lb      BMI kg/ 34.6 32.71      Waist Circumference (in)  42 in 41.5in          Medication Assessment & Plan    Patient's current BMI is 32.71, which is considered Class I Obesity. Patient has lost 11 lbs. Congratulated her on her success.    Will order Wegovy 1.7mg SC weekly. The following medications will need dose adjustments/closer monitoring once the GLP1 is started: levothyroxine     Discussed lifestyle modifications, including diet and exercise.  Patient education provided.     Worked with patient to create SMART Goal(s): Drink 64 oz of water everyday. Achieve/Track 10,000 steps every day via her watch.    Lamar Castro RPH  6/2/2022  09:02 EDT

## 2022-06-12 DIAGNOSIS — Z76.0 MEDICATION REFILL: ICD-10-CM

## 2022-06-12 DIAGNOSIS — I10 ESSENTIAL (PRIMARY) HYPERTENSION: ICD-10-CM

## 2022-06-13 RX ORDER — LISINOPRIL 10 MG/1
10 TABLET ORAL DAILY
Qty: 90 TABLET | Refills: 0 | Status: SHIPPED | OUTPATIENT
Start: 2022-06-13 | End: 2022-09-11 | Stop reason: SDUPTHER

## 2022-06-30 ENCOUNTER — DISEASE STATE MANAGEMENT VISIT (OUTPATIENT)
Dept: PHARMACY | Facility: HOSPITAL | Age: 52
End: 2022-06-30

## 2022-06-30 VITALS
WEIGHT: 185.4 LBS | SYSTOLIC BLOOD PRESSURE: 149 MMHG | DIASTOLIC BLOOD PRESSURE: 83 MMHG | BODY MASS INDEX: 31.82 KG/M2 | HEART RATE: 81 BPM

## 2022-06-30 RX ORDER — SEMAGLUTIDE 2.4 MG/.75ML
2.4 INJECTION, SOLUTION SUBCUTANEOUS WEEKLY
Qty: 3 ML | Refills: 0 | Status: SHIPPED | OUTPATIENT
Start: 2022-06-30 | End: 2022-07-28

## 2022-06-30 NOTE — PROGRESS NOTES
[facility]  Weight Management Program        Jaquan Carlson is a 51 y.o. female referred to the [facility] by Adonis Erwin for clinical pharmacy and specialty pharmacy management of GLP-1 Receptor Agonists for weight management.  Jaquan Carlson has previously tried Saxenda, Adipex, and lifestyle changes for weight loss.  Current weight loss efforts include Wegovy 1.7 mg weekly and lifestyle efforts. The patient denies a person history or family history of thyroid cancer and denies a personal history of pancreatitis. The patient denies any side effects and has had no issues with self-administering the injection. She has been meeting her goal of drinking 64 ounces of water every day and getting in 10,000 steps per day.    Relevant Past Medical History and Co-morbidities  Past Medical History:   Diagnosis Date   • Diarrhea    • Hypertensive disorder 10/7/2021   • Hypothyroidism 12/6/2021   • Umbilical hernia      Social History     Socioeconomic History   • Marital status:    Tobacco Use   • Smoking status: Never Smoker   • Smokeless tobacco: Never Used   Substance and Sexual Activity   • Alcohol use: No   • Drug use: No   • Sexual activity: Defer     Birth control/protection: Surgical       Allergies  Penicillins    Current Medication List    Current Outpatient Medications:   •  levothyroxine (Synthroid) 50 MCG tablet, Take 1 tablet by mouth Daily., Disp: 90 tablet, Rfl: 0  •  lisinopril (PRINIVIL,ZESTRIL) 10 MG tablet, Take 1 tablet by mouth Daily., Disp: 90 tablet, Rfl: 0  •  Loratadine 10 MG capsule, Take 10 mg by mouth Daily., Disp: , Rfl:   •  Omega-3 Fatty Acids (fish oil) 1000 MG capsule capsule, Take 1 capsule by mouth 2 (Two) Times a Day With Meals., Disp: 60 capsule, Rfl: 5  •  Semaglutide-Weight Management (Wegovy) 1.7 MG/0.75ML solution auto-injector, Inject 0.75 mL under the skin into the appropriate area as directed 1 (One) Time Per Week., Disp: 3 mL,  Rfl: 0  •  vitamin D (ERGOCALCIFEROL) 1.25 MG (27651 UT) capsule capsule, TAKE 1 CAPSULE BY MOUTH WEEKLY (Patient taking differently: Take 50,000 Units by mouth Every 30 (Thirty) Days.), Disp: 4 capsule, Rfl: 3    Drug Interactions  None     Relevant Laboratory Values  Lab Results   Component Value Date    CHOL 223 (H) 10/19/2021    CHLPL 199 08/15/2014    TRIG 192 (H) 10/19/2021    HDL 44 10/19/2021     (H) 10/19/2021     There is no height or weight on file to calculate BMI.    Vaccinations:   Patient recommended to keep up with routine vaccinations.     Goals of Therapy  • Clinical Goals or Therapeutic Targets: 10% weight loss goal      Date 5/2/22 6/2/22 6/30/22     Weight (kg) 91.4 kg  201.08lb 190.6lb 185.4lb     BMI kg/ 34.6 32.71 31.8     Waist Circumference (in)  42 in 41.5in 40in         Medication Assessment & Plan    Patient's current BMI is 31.8, which is considered Class I Obesity. Patient has lost 15.7 lbs. Congratulated her on her success.    Will order Wegovy 2.4mg SC weekly. The following medications will need dose adjustments/closer monitoring once the GLP1 is started: levothyroxine     Discussed lifestyle modifications, including diet and exercise.  Patient education provided.     Worked with patient to create SMART Goal(s): Drink 64 oz of water everyday. Achieve/Track 10,000 steps every day via her watch.    She is due for laboratory monitoring.  Placed order for lipid panel, A1c, CMP and thyroid panel.  She will complete labs and see provider in the next month.     Gema Villa, PharmD  6/30/2022  09:07 EDT

## 2022-07-08 ENCOUNTER — LAB (OUTPATIENT)
Dept: LAB | Facility: HOSPITAL | Age: 52
End: 2022-07-08

## 2022-07-08 LAB
ALBUMIN SERPL-MCNC: 4.4 G/DL (ref 3.5–5.2)
ALBUMIN/GLOB SERPL: 1.5 G/DL
ALP SERPL-CCNC: 76 U/L (ref 39–117)
ALT SERPL W P-5'-P-CCNC: 40 U/L (ref 1–33)
ANION GAP SERPL CALCULATED.3IONS-SCNC: 11 MMOL/L (ref 5–15)
AST SERPL-CCNC: 22 U/L (ref 1–32)
BILIRUB SERPL-MCNC: 0.4 MG/DL (ref 0–1.2)
BUN SERPL-MCNC: 15 MG/DL (ref 6–20)
BUN/CREAT SERPL: 12.1 (ref 7–25)
CALCIUM SPEC-SCNC: 9.4 MG/DL (ref 8.6–10.5)
CHLORIDE SERPL-SCNC: 105 MMOL/L (ref 98–107)
CHOLEST SERPL-MCNC: 212 MG/DL (ref 0–200)
CO2 SERPL-SCNC: 22 MMOL/L (ref 22–29)
CREAT SERPL-MCNC: 1.24 MG/DL (ref 0.57–1)
EGFRCR SERPLBLD CKD-EPI 2021: 52.5 ML/MIN/1.73
GLOBULIN UR ELPH-MCNC: 3 GM/DL
GLUCOSE SERPL-MCNC: 86 MG/DL (ref 65–99)
HBA1C MFR BLD: 5.8 % (ref 4.8–5.6)
HDLC SERPL-MCNC: 50 MG/DL (ref 40–60)
LDLC SERPL CALC-MCNC: 134 MG/DL (ref 0–100)
LDLC/HDLC SERPL: 2.62 {RATIO}
POTASSIUM SERPL-SCNC: 4.8 MMOL/L (ref 3.5–5.2)
PROT SERPL-MCNC: 7.4 G/DL (ref 6–8.5)
SODIUM SERPL-SCNC: 138 MMOL/L (ref 136–145)
T-UPTAKE NFR SERPL: 0.94 TBI (ref 0.8–1.3)
T4 SERPL-MCNC: 9.64 MCG/DL (ref 4.5–11.7)
TRIGL SERPL-MCNC: 155 MG/DL (ref 0–150)
TSH SERPL DL<=0.05 MIU/L-ACNC: 2.73 UIU/ML (ref 0.27–4.2)
VLDLC SERPL-MCNC: 28 MG/DL (ref 5–40)

## 2022-07-08 PROCEDURE — 80053 COMPREHEN METABOLIC PANEL: CPT

## 2022-07-08 PROCEDURE — 36415 COLL VENOUS BLD VENIPUNCTURE: CPT

## 2022-07-08 PROCEDURE — 84436 ASSAY OF TOTAL THYROXINE: CPT

## 2022-07-08 PROCEDURE — 80061 LIPID PANEL: CPT

## 2022-07-08 PROCEDURE — 84443 ASSAY THYROID STIM HORMONE: CPT

## 2022-07-08 PROCEDURE — 84479 ASSAY OF THYROID (T3 OR T4): CPT

## 2022-07-08 PROCEDURE — 83036 HEMOGLOBIN GLYCOSYLATED A1C: CPT

## 2022-07-28 ENCOUNTER — DISEASE STATE MANAGEMENT VISIT (OUTPATIENT)
Dept: PHARMACY | Facility: HOSPITAL | Age: 52
End: 2022-07-28

## 2022-07-28 VITALS
SYSTOLIC BLOOD PRESSURE: 147 MMHG | DIASTOLIC BLOOD PRESSURE: 87 MMHG | HEART RATE: 103 BPM | WEIGHT: 180 LBS | BODY MASS INDEX: 30.9 KG/M2

## 2022-07-28 RX ORDER — TIRZEPATIDE 2.5 MG/.5ML
2.5 INJECTION, SOLUTION SUBCUTANEOUS WEEKLY
Qty: 2 ML | Refills: 0 | Status: SHIPPED | OUTPATIENT
Start: 2022-07-28 | End: 2022-08-25

## 2022-07-28 NOTE — PROGRESS NOTES
[facility]  Weight Management Program        Jaquan Carlson is a 51 y.o. female referred to the [facility] by Adonis Erwin for clinical pharmacy and specialty pharmacy management of GLP-1 Receptor Agonists for weight management.  Jaquan Carlson has previously tried Saxenda, Adipex, and lifestyle changes for weight loss.  Current weight loss efforts include Wegovy 2.4 mg weekly and lifestyle efforts. The patient denies a personal history or family history of thyroid cancer and denies a personal history of pancreatitis. The patient denies any side effects and has had no issues with self-administering the injection. She has been meeting her goal of drinking 64 ounces of water every day and getting in 10,000 steps per day.    Relevant Past Medical History and Co-morbidities  Past Medical History:   Diagnosis Date   • Diarrhea    • Hypertensive disorder 10/7/2021   • Hypothyroidism 12/6/2021   • Umbilical hernia      Social History     Socioeconomic History   • Marital status:    Tobacco Use   • Smoking status: Never Smoker   • Smokeless tobacco: Never Used   Substance and Sexual Activity   • Alcohol use: No   • Drug use: No   • Sexual activity: Defer     Birth control/protection: Surgical       Allergies  Penicillins    Current Medication List    Current Outpatient Medications:   •  levothyroxine (Synthroid) 50 MCG tablet, Take 1 tablet by mouth Daily., Disp: 90 tablet, Rfl: 0  •  lisinopril (PRINIVIL,ZESTRIL) 10 MG tablet, Take 1 tablet by mouth Daily., Disp: 90 tablet, Rfl: 0  •  Loratadine 10 MG capsule, Take 10 mg by mouth Daily., Disp: , Rfl:   •  Omega-3 Fatty Acids (fish oil) 1000 MG capsule capsule, Take 1 capsule by mouth 2 (Two) Times a Day With Meals., Disp: 60 capsule, Rfl: 5  •  Semaglutide-Weight Management (Wegovy) 2.4 MG/0.75ML solution auto-injector, Inject 2.4 mg under the skin into the appropriate area as directed 1 (One) Time Per Week., Disp: 3 mL,  Rfl: 0  •  vitamin D (ERGOCALCIFEROL) 1.25 MG (91553 UT) capsule capsule, TAKE 1 CAPSULE BY MOUTH WEEKLY (Patient taking differently: Take 50,000 Units by mouth Every 30 (Thirty) Days.), Disp: 4 capsule, Rfl: 3    Drug Interactions  None     Relevant Laboratory Values  Lab Results   Component Value Date    CHOL 212 (H) 07/08/2022    CHLPL 199 08/15/2014    TRIG 155 (H) 07/08/2022    HDL 50 07/08/2022     (H) 07/08/2022     Body mass index is 30.9 kg/m².    Vaccinations:   Patient recommended to keep up with routine vaccinations.     Goals of Therapy  • Clinical Goals or Therapeutic Targets: 10% weight loss goal      Date 5/2/22 6/2/22 6/30/22 7/28/22    Weight (kg) 91.4 kg  201.08lb 190.6lb 185.4lb 180lb    BMI kg/ 34.6 32.71 31.8 30.9    Waist Circumference (in)  42 in 41.5in 40in 40in        Medication Assessment & Plan    Patient's current BMI is 30.9, which is considered Class I Obesity. Patient has lost 21.08lbs. Congratulated her on her success.    Patient would like to switch to Mounjaro. Will order Mounjaro 2.5mg SC weekly. Medication education and counseling provided.  Pt aware that Mounjaro use is off-label and she is eager to start it. The following medications will need dose adjustments/closer monitoring once the GLP1 is started: levothyroxine     Discussed lifestyle modifications, including diet and exercise.  Patient education provided.     Worked with patient to create SMART Goal(s): Drink 64 oz of water everyday. Achieve/Track 10,000 steps every day via her watch.      Glenny Amaral, Pharmacy Intern   Gema Villa, Michelle  7/28/2022  09:12 EDT

## 2022-08-03 DIAGNOSIS — E03.9 HYPOTHYROIDISM, UNSPECIFIED TYPE: ICD-10-CM

## 2022-08-03 DIAGNOSIS — Z76.0 MEDICATION REFILL: ICD-10-CM

## 2022-08-03 RX ORDER — LEVOTHYROXINE SODIUM 0.05 MG/1
50 TABLET ORAL DAILY
Qty: 90 TABLET | Refills: 0 | Status: SHIPPED | OUTPATIENT
Start: 2022-08-03 | End: 2022-10-28 | Stop reason: SDUPTHER

## 2022-08-25 ENCOUNTER — DISEASE STATE MANAGEMENT VISIT (OUTPATIENT)
Dept: PHARMACY | Facility: HOSPITAL | Age: 52
End: 2022-08-25

## 2022-08-25 VITALS
HEART RATE: 90 BPM | WEIGHT: 181 LBS | DIASTOLIC BLOOD PRESSURE: 87 MMHG | BODY MASS INDEX: 31.07 KG/M2 | SYSTOLIC BLOOD PRESSURE: 135 MMHG

## 2022-08-25 RX ORDER — TIRZEPATIDE 5 MG/.5ML
5 INJECTION, SOLUTION SUBCUTANEOUS WEEKLY
Qty: 2 ML | Refills: 0 | Status: SHIPPED | OUTPATIENT
Start: 2022-08-25 | End: 2022-09-22

## 2022-08-25 NOTE — PROGRESS NOTES
[facility]  Weight Management Program        Jaquan Carlson is a 52 y.o. female referred to the [facility] by Adonis Erwin for clinical pharmacy and specialty pharmacy management of GLP-1 Receptor Agonists for weight management.  Jaquan Carlson has previously tried Saxenda, Adipex, Wegovy,  and lifestyle changes for weight loss.  Current weight loss efforts include Mounjaro 2.5mg weekly. The patient denies a personal history or family history of thyroid cancer and denies a personal history of pancreatitis. The patient denies any side effects other than some nausea and has had no issues with self-administering the injection. She has been meeting her goal of drinking 64 ounces of water every day and getting in 10,000 steps per day.    Relevant Past Medical History and Co-morbidities  Past Medical History:   Diagnosis Date   • Diarrhea    • Hypertensive disorder 10/7/2021   • Hypothyroidism 12/6/2021   • Umbilical hernia      Social History     Socioeconomic History   • Marital status:    Tobacco Use   • Smoking status: Never Smoker   • Smokeless tobacco: Never Used   Substance and Sexual Activity   • Alcohol use: No   • Drug use: No   • Sexual activity: Defer     Birth control/protection: Surgical       Allergies  Penicillins    Current Medication List    Current Outpatient Medications:   •  levothyroxine (Synthroid) 50 MCG tablet, Take 1 tablet by mouth Daily., Disp: 90 tablet, Rfl: 0  •  lisinopril (PRINIVIL,ZESTRIL) 10 MG tablet, Take 1 tablet by mouth Daily., Disp: 90 tablet, Rfl: 0  •  Loratadine 10 MG capsule, Take 10 mg by mouth Daily., Disp: , Rfl:   •  Omega-3 Fatty Acids (fish oil) 1000 MG capsule capsule, Take 1 capsule by mouth 2 (Two) Times a Day With Meals., Disp: 60 capsule, Rfl: 5  •  Tirzepatide (Mounjaro) 2.5 MG/0.5ML solution pen-injector, Inject 2.5 mg under the skin into the appropriate area as directed 1 (One) Time Per Week., Disp: 2 mL, Rfl:  0  •  vitamin D (ERGOCALCIFEROL) 1.25 MG (11555 UT) capsule capsule, TAKE 1 CAPSULE BY MOUTH WEEKLY (Patient taking differently: Take 50,000 Units by mouth Every 30 (Thirty) Days.), Disp: 4 capsule, Rfl: 3    Drug Interactions  None     Relevant Laboratory Values  Lab Results   Component Value Date    CHOL 212 (H) 07/08/2022    CHLPL 199 08/15/2014    TRIG 155 (H) 07/08/2022    HDL 50 07/08/2022     (H) 07/08/2022     There is no height or weight on file to calculate BMI.    Vaccinations:   Patient recommended to keep up with routine vaccinations.     Goals of Therapy  • Clinical Goals or Therapeutic Targets: 10% weight loss goal      Date 5/2/22 6/2/22 6/30/22 7/28/22 8/25/55   Weight (kg) 91.4 kg  201.08lb 190.6lb 185.4lb 180lb 181lb   BMI kg/ 34.6 32.71 31.8 30.9 31   Waist Circumference (in)  42 in 41.5in 40in 40in 39.75in       Medication Assessment & Plan    Patient's current BMI is 31, which is considered Class I Obesity. Patient has lost 21lbs. Congratulated her on her success.    Patient would like to continue Mounjaro. Will order Mounjaro 5mg SC weekly. Medication education and counseling provided.  Pt aware that Mounjaro use is off-label and she is eager to start it. The following medications will need dose adjustments/closer monitoring once the GLP1 is started: levothyroxine     Discussed lifestyle modifications, including diet and exercise.  Patient education provided.     Worked with patient to create SMART Goal(s): Increase water intake to drink 80 oz of water everyday. Continue to Achieve/Track 10,000 steps every day via her watch.      Gema Villa, LuisD   8/25/2022  09:14 EDT

## 2022-09-11 DIAGNOSIS — Z76.0 MEDICATION REFILL: ICD-10-CM

## 2022-09-11 DIAGNOSIS — I10 ESSENTIAL (PRIMARY) HYPERTENSION: ICD-10-CM

## 2022-09-11 RX ORDER — LISINOPRIL 10 MG/1
10 TABLET ORAL DAILY
Qty: 90 TABLET | Refills: 0 | Status: SHIPPED | OUTPATIENT
Start: 2022-09-11 | End: 2022-12-08 | Stop reason: SDUPTHER

## 2022-09-22 ENCOUNTER — DISEASE STATE MANAGEMENT VISIT (OUTPATIENT)
Dept: PHARMACY | Facility: HOSPITAL | Age: 52
End: 2022-09-22

## 2022-09-22 VITALS
WEIGHT: 175.6 LBS | HEART RATE: 105 BPM | DIASTOLIC BLOOD PRESSURE: 72 MMHG | SYSTOLIC BLOOD PRESSURE: 117 MMHG | BODY MASS INDEX: 30.14 KG/M2

## 2022-09-22 RX ORDER — TIRZEPATIDE 7.5 MG/.5ML
7.5 INJECTION, SOLUTION SUBCUTANEOUS WEEKLY
Qty: 2 ML | Refills: 0 | Status: SHIPPED | OUTPATIENT
Start: 2022-09-22 | End: 2022-10-20

## 2022-09-22 NOTE — PROGRESS NOTES
[facility]  Weight Management Program        Jaquan Carlson is a 52 y.o. female referred to the [facility] by Adonis Erwin for clinical pharmacy and specialty pharmacy management of GLP-1 Receptor Agonists for weight management.  Jaquan Carlson has previously tried Saxenda, Adipex, Wegovy,  and lifestyle changes for weight loss.  Current weight loss efforts include Mounjaro 5mg weekly. The patient denies a personal history or family history of thyroid cancer and denies a personal history of pancreatitis. The patient denies any side effects and has had no issues with self-administering the injection. She has been meeting her goal of drinking 80 ounces of water every day and getting in 10,000 steps per day.    Relevant Past Medical History and Co-morbidities  Past Medical History:   Diagnosis Date   • Diarrhea    • Hypertensive disorder 10/7/2021   • Hypothyroidism 12/6/2021   • Umbilical hernia      Social History     Socioeconomic History   • Marital status:    Tobacco Use   • Smoking status: Never Smoker   • Smokeless tobacco: Never Used   Substance and Sexual Activity   • Alcohol use: No   • Drug use: No   • Sexual activity: Defer     Birth control/protection: Surgical       Allergies  Penicillins    Current Medication List    Current Outpatient Medications:   •  levothyroxine (Synthroid) 50 MCG tablet, Take 1 tablet by mouth Daily., Disp: 90 tablet, Rfl: 0  •  lisinopril (PRINIVIL,ZESTRIL) 10 MG tablet, Take 1 tablet by mouth Daily., Disp: 90 tablet, Rfl: 0  •  Loratadine 10 MG capsule, Take 10 mg by mouth Daily., Disp: , Rfl:   •  Omega-3 Fatty Acids (fish oil) 1000 MG capsule capsule, Take 1 capsule by mouth 2 (Two) Times a Day With Meals., Disp: 60 capsule, Rfl: 5  •  Tirzepatide (Mounjaro) 5 MG/0.5ML solution pen-injector, Inject 5 mg under the skin into the appropriate area as directed 1 (One) Time Per Week., Disp: 2 mL, Rfl: 0  •  vitamin D  (ERGOCALCIFEROL) 1.25 MG (61679 UT) capsule capsule, TAKE 1 CAPSULE BY MOUTH WEEKLY (Patient taking differently: Take 50,000 Units by mouth Every 30 (Thirty) Days.), Disp: 4 capsule, Rfl: 3    Drug Interactions  None     Relevant Laboratory Values  Lab Results   Component Value Date    CHOL 212 (H) 07/08/2022    CHLPL 199 08/15/2014    TRIG 155 (H) 07/08/2022    HDL 50 07/08/2022     (H) 07/08/2022     There is no height or weight on file to calculate BMI.    Vaccinations:   Patient recommended to keep up with routine vaccinations.     Goals of Therapy  • Clinical Goals or Therapeutic Targets: 10% weight loss goal      Date 5/2/22 6/2/22 6/30/22 7/28/22 8/25/55 9/22/22   Weight (kg) 91.4 kg  201.08lb 190.6lb 185.4lb 180lb 181lb 175.6lb   BMI kg/ 34.6 32.71 31.8 30.9 31 30   Waist Circumference (in)  42 in 41.5in 40in 40in 39.75in 39.75       Medication Assessment & Plan    Patient's current BMI is 30, which is considered Class I Obesity. Patient has lost 25lbs. Congratulated her on her success.    Patient would like to continue Mounjaro. Will order Mounjaro 7.5mg SC weekly. Medication education and counseling provided.  Pt aware that Mounjaro use is off-label and she is eager to start it. The following medications will need dose adjustments/closer monitoring once the GLP1 is started: levothyroxine     Discussed lifestyle modifications, including diet and exercise.  Patient education provided.     Worked with patient to create SMART Goal(s): Increase water intake to drink 80 oz of water everyday. Continue to Achieve/Track 10,000 steps every day via her watch.    Will follow-up in 4 weeks.     Gema Villa, Michelle   9/22/2022  08:03 EDT

## 2022-10-10 ENCOUNTER — HOSPITAL ENCOUNTER (OUTPATIENT)
Dept: MAMMOGRAPHY | Facility: HOSPITAL | Age: 52
Discharge: HOME | End: 2022-10-10
Admitting: NURSE PRACTITIONER

## 2022-10-10 DIAGNOSIS — Z12.31 VISIT FOR SCREENING MAMMOGRAM: ICD-10-CM

## 2022-10-10 PROCEDURE — 77067 SCR MAMMO BI INCL CAD: CPT | Performed by: RADIOLOGY

## 2022-10-10 PROCEDURE — 77063 BREAST TOMOSYNTHESIS BI: CPT

## 2022-10-10 PROCEDURE — 77063 BREAST TOMOSYNTHESIS BI: CPT | Performed by: RADIOLOGY

## 2022-10-10 PROCEDURE — 77067 SCR MAMMO BI INCL CAD: CPT

## 2022-10-20 ENCOUNTER — SPECIALTY PHARMACY (OUTPATIENT)
Dept: PHARMACY | Facility: HOSPITAL | Age: 52
End: 2022-10-20

## 2022-10-20 ENCOUNTER — DISEASE STATE MANAGEMENT VISIT (OUTPATIENT)
Dept: PHARMACY | Facility: HOSPITAL | Age: 52
End: 2022-10-20

## 2022-10-20 VITALS
HEIGHT: 64 IN | BODY MASS INDEX: 29.02 KG/M2 | SYSTOLIC BLOOD PRESSURE: 119 MMHG | DIASTOLIC BLOOD PRESSURE: 77 MMHG | WEIGHT: 170 LBS | HEART RATE: 85 BPM

## 2022-10-20 RX ORDER — SEMAGLUTIDE 2.4 MG/.75ML
2.4 INJECTION, SOLUTION SUBCUTANEOUS WEEKLY
Qty: 3 ML | Refills: 0 | Status: SHIPPED | OUTPATIENT
Start: 2022-10-20 | End: 2022-10-25

## 2022-10-20 NOTE — PROGRESS NOTES
[facility]  Weight Management Program        Jaquan Carlson is a 52 y.o. female referred to the [facility] by Adonis Erwin for clinical pharmacy and specialty pharmacy management of GLP-1 Receptor Agonists for weight management.  Jaquan Carlson has previously tried Saxenda, Adipex, Wegovy, and lifestyle changes for weight loss.  Current weight loss efforts include Mounjaro 7.5 mg weekly. The patient denies a personal history or family history of thyroid cancer and denies a personal history of pancreatitis. The patient denies any side effects and has had no issues with self-administering the injection. She has been meeting her goal of drinking 80 ounces of water every day and getting in 10,000 steps per day.        Relevant Past Medical History and Co-morbidities  Past Medical History:   Diagnosis Date   • Diarrhea    • Hypertensive disorder 10/7/2021   • Hypothyroidism 12/6/2021   • Umbilical hernia      Social History     Socioeconomic History   • Marital status:    Tobacco Use   • Smoking status: Never   • Smokeless tobacco: Never   Substance and Sexual Activity   • Alcohol use: No   • Drug use: No   • Sexual activity: Defer     Birth control/protection: Surgical       Allergies  Penicillins    Current Medication List    Current Outpatient Medications:   •  levothyroxine (Synthroid) 50 MCG tablet, Take 1 tablet by mouth Daily., Disp: 90 tablet, Rfl: 0  •  lisinopril (PRINIVIL,ZESTRIL) 10 MG tablet, Take 1 tablet by mouth Daily., Disp: 90 tablet, Rfl: 0  •  Loratadine 10 MG capsule, Take 10 mg by mouth Daily., Disp: , Rfl:   •  Omega-3 Fatty Acids (fish oil) 1000 MG capsule capsule, Take 1 capsule by mouth 2 (Two) Times a Day With Meals., Disp: 60 capsule, Rfl: 5  •  Semaglutide-Weight Management (Wegovy) 2.4 MG/0.75ML solution auto-injector, Inject 2.4 mg under the skin into the appropriate area as directed 1 (One) Time Per Week., Disp: 3 mL, Rfl: 0  •  vitamin  D (ERGOCALCIFEROL) 1.25 MG (93298 UT) capsule capsule, TAKE 1 CAPSULE BY MOUTH WEEKLY (Patient taking differently: Take 1 capsule by mouth Every 30 (Thirty) Days.), Disp: 4 capsule, Rfl: 3    Drug Interactions  None     Relevant Laboratory Values  Lab Results   Component Value Date    CHOL 212 (H) 07/08/2022    CHLPL 199 08/15/2014    TRIG 155 (H) 07/08/2022    HDL 50 07/08/2022     (H) 07/08/2022     Vaccinations:   Patient recommended to keep up with routine vaccinations.     Goals of Therapy  • Clinical Goals or Therapeutic Targets: 10% weight loss goal      Date 5/2/22 6/2/22 6/30/22 7/28/22 8/25/55 9/22/22   Weight (kg) 91.4 kg  201.08lb 190.6lb 185.4lb 180lb 181lb 175.6lb   BMI kg/ 34.6 32.71 31.8 30.9 31 30   Waist Circumference (in)  42 in 41.5in 40in 40in 39.75in 39.75     Date 10/20/22   Weight (kg) 170 lbs   BMI kg/ 29.18   Waist Circumference (in)  38 in     Medication Assessment & Plan    Patient's current BMI is 29.18, which is considered overweight. Patient has lost 31.08lbs. Congratulated her on her success!    Informed patient Mounjaro is no longer covered with savings card. Patient reported that Saxenda caused patient to be sick and wanted to continue using Wegovy 2.4mg since she was tolerating the medication well.     Will order Wegovy 2.4 SC weekly.     The following medications will need dose adjustments/closer monitoring once the GLP1 is started: levothyroxine     Discussed lifestyle modifications, including diet and exercise.  Patient has previously been on Wegovy 2.4 mg and was aware on how to use the medication.    Worked with patient to create SMART Goal(s): Increase water intake to drink 80 oz of water everyday. Continue to Achieve/Track 10,000 steps every day via her watch.    Will follow-up in 4 weeks.     Makenna Mayfield [facility]   10/20/2022  08:57 EDT

## 2022-10-20 NOTE — PROGRESS NOTES
[facility]  Weight Management Program        Jaquan Carlson is a 52 y.o. female referred to the [facility] by Adonis Erwin for clinical pharmacy and specialty pharmacy management of GLP-1 Receptor Agonists for weight management.  Jaquan Carlson has previously tried Saxenda, Adipex, Wegovy, and lifestyle changes for weight loss.  Current weight loss efforts include Mounjaro 7.5 mg weekly. The patient denies a personal history or family history of thyroid cancer and denies a personal history of pancreatitis. The patient denies any side effects and has had no issues with self-administering the injection. She has been meeting her goal of drinking 80 ounces of water every day and getting in 10,000 steps per day.        Relevant Past Medical History and Co-morbidities  Past Medical History:   Diagnosis Date   • Diarrhea    • Hypertensive disorder 10/7/2021   • Hypothyroidism 12/6/2021   • Umbilical hernia      Social History     Socioeconomic History   • Marital status:    Tobacco Use   • Smoking status: Never   • Smokeless tobacco: Never   Substance and Sexual Activity   • Alcohol use: No   • Drug use: No   • Sexual activity: Defer     Birth control/protection: Surgical       Allergies  Penicillins    Current Medication List    Current Outpatient Medications:   •  levothyroxine (Synthroid) 50 MCG tablet, Take 1 tablet by mouth Daily., Disp: 90 tablet, Rfl: 0  •  lisinopril (PRINIVIL,ZESTRIL) 10 MG tablet, Take 1 tablet by mouth Daily., Disp: 90 tablet, Rfl: 0  •  Loratadine 10 MG capsule, Take 10 mg by mouth Daily., Disp: , Rfl:   •  Omega-3 Fatty Acids (fish oil) 1000 MG capsule capsule, Take 1 capsule by mouth 2 (Two) Times a Day With Meals., Disp: 60 capsule, Rfl: 5  •  Tirzepatide (Mounjaro) 7.5 MG/0.5ML solution pen-injector, Inject 7.5 mg under the skin into the appropriate area as directed 1 (One) Time Per Week., Disp: 2 mL, Rfl: 0  •  vitamin D (ERGOCALCIFEROL)  1.25 MG (76674 UT) capsule capsule, TAKE 1 CAPSULE BY MOUTH WEEKLY (Patient taking differently: Take 50,000 Units by mouth Every 30 (Thirty) Days.), Disp: 4 capsule, Rfl: 3    Drug Interactions  None     Relevant Laboratory Values  Lab Results   Component Value Date    CHOL 212 (H) 07/08/2022    CHLPL 199 08/15/2014    TRIG 155 (H) 07/08/2022    HDL 50 07/08/2022     (H) 07/08/2022     Vaccinations:   Patient recommended to keep up with routine vaccinations.     Goals of Therapy  • Clinical Goals or Therapeutic Targets: 10% weight loss goal      Date 5/2/22 6/2/22 6/30/22 7/28/22 8/25/55 9/22/22   Weight (kg) 91.4 kg  201.08lb 190.6lb 185.4lb 180lb 181lb 175.6lb   BMI kg/ 34.6 32.71 31.8 30.9 31 30   Waist Circumference (in)  42 in 41.5in 40in 40in 39.75in 39.75     Date 10/20/22   Weight (kg) 170 lbs   BMI kg/ 29.18   Waist Circumference (in)  38 in     Medication Assessment & Plan    Patient's current BMI is 29.18, which is considered overweight. Patient has lost 31.08lbs. Congratulated her on her success!    Informed patient Mounjaro is no longer covered with savings card. Patient reported that Saxenda caused patient to be sick and wanted to continue using Wegovy 2.4mg since she was tolerating the medication well. Will order Wegovy 2.4 SC weekly.     The following medications will need dose adjustments/closer monitoring once the GLP1 is started: levothyroxine     Discussed lifestyle modifications, including diet and exercise.  Patient education provided.     Worked with patient to create SMART Goal(s): Increase water intake to drink 80 oz of water everyday. Continue to Achieve/Track 10,000 steps every day via her watch.    Will follow-up in 4 weeks.     Makenna [facility]field [facility]   10/20/2022  08:18 EDT

## 2022-10-25 ENCOUNTER — TELEPHONE (OUTPATIENT)
Dept: PSYCHIATRY | Facility: CLINIC | Age: 52
End: 2022-10-25

## 2022-10-25 ENCOUNTER — DISEASE STATE MANAGEMENT VISIT (OUTPATIENT)
Dept: PHARMACY | Facility: HOSPITAL | Age: 52
End: 2022-10-25

## 2022-10-25 DIAGNOSIS — R11.0 NAUSEA: Primary | ICD-10-CM

## 2022-10-25 RX ORDER — SEMAGLUTIDE 1.7 MG/.75ML
1.7 INJECTION, SOLUTION SUBCUTANEOUS WEEKLY
Qty: 3 ML | Refills: 0 | Status: SHIPPED | OUTPATIENT
Start: 2022-10-25 | End: 2022-12-15

## 2022-10-25 NOTE — PROGRESS NOTES
[facility]  Weight Management Program        Jaquan Carlson is a 52 y.o. female referred to the [facility] by Adonis Erwin for clinical pharmacy and specialty pharmacy management of GLP-1 Receptor Agonists for weight management.  Jaquan Carlson has previously tried Saxenda, Adipex, Wegovy, and lifestyle changes for weight loss.  Current weight loss efforts includes one week of Wegovy 2.4 mg. The patient denies a personal history or family history of thyroid cancer and denies a personal history of pancreatitis. The patient denies any previous side effects and has had no issues with self-administering the injection. However, patient just last week was changed from Mounjaro 7.5 mg and wanted to try and resume Wegovy 2.4 mg that she had tolerated previously and has not been able to tolerate. Patient reports vomiting several times and having persistent nausea that is not tolerable to function throughout the day.   She has been meeting her goal of drinking 80 ounces of water every day and getting in 10,000 steps per day.        Relevant Past Medical History and Co-morbidities  Past Medical History:   Diagnosis Date   • Diarrhea    • Hypertensive disorder 10/7/2021   • Hypothyroidism 12/6/2021   • Umbilical hernia      Social History     Socioeconomic History   • Marital status:    Tobacco Use   • Smoking status: Never   • Smokeless tobacco: Never   Substance and Sexual Activity   • Alcohol use: No   • Drug use: No   • Sexual activity: Defer     Birth control/protection: Surgical       Allergies  Penicillins    Current Medication List    Current Outpatient Medications:   •  levothyroxine (Synthroid) 50 MCG tablet, Take 1 tablet by mouth Daily., Disp: 90 tablet, Rfl: 0  •  lisinopril (PRINIVIL,ZESTRIL) 10 MG tablet, Take 1 tablet by mouth Daily., Disp: 90 tablet, Rfl: 0  •  Loratadine 10 MG capsule, Take 10 mg by mouth Daily., Disp: , Rfl:   •  Omega-3 Fatty Acids (fish  oil) 1000 MG capsule capsule, Take 1 capsule by mouth 2 (Two) Times a Day With Meals., Disp: 60 capsule, Rfl: 5  •  Semaglutide-Weight Management (Wegovy) 2.4 MG/0.75ML solution auto-injector, Inject 2.4 mg under the skin into the appropriate area as directed 1 (One) Time Per Week., Disp: 3 mL, Rfl: 0  •  vitamin D (ERGOCALCIFEROL) 1.25 MG (33530 UT) capsule capsule, TAKE 1 CAPSULE BY MOUTH WEEKLY (Patient taking differently: Take 1 capsule by mouth Every 30 (Thirty) Days.), Disp: 4 capsule, Rfl: 3    Drug Interactions  None     Relevant Laboratory Values  Lab Results   Component Value Date    CHOL 212 (H) 07/08/2022    CHLPL 199 08/15/2014    TRIG 155 (H) 07/08/2022    HDL 50 07/08/2022     (H) 07/08/2022     Vaccinations:   Patient recommended to keep up with routine vaccinations.     Goals of Therapy  • Clinical Goals or Therapeutic Targets: 10% weight loss goal      Date 5/2/22 6/2/22 6/30/22 7/28/22 8/25/55 9/22/22   Weight (kg) 91.4 kg  201.08lb 190.6lb 185.4lb 180lb 181lb 175.6lb   BMI kg/ 34.6 32.71 31.8 30.9 31 30   Waist Circumference (in)  42 in 41.5in 40in 40in 39.75in 39.75     Date 10/20/22   Weight (kg) 170 lbs   BMI kg/ 29.18   Waist Circumference (in)  38 in     Medication Assessment & Plan    Patient's current BMI is 29.18, which is considered overweight. Patient has lost 31.08lbs. Congratulated her on her success!    Will order Wegovy 1.7 mg SC weekly. Patient has not tolerated increase back to Wegovy 2.4 mg from last week after transitioning from Mounjaro 7.5 to Wegovy 2.4 mg. Patient will keep normal follow up as previously scheduled and has reached out to referring provider to request nausea medication.     The following medications will need dose adjustments/closer monitoring once the GLP1 is started: levothyroxine     Discussed lifestyle modifications, including diet and exercise.  Patient education provided.     Worked with patient to create SMART Goal(s): Increase water intake to  drink 80 oz of water everyday. Continue to Achieve/Track 10,000 steps every day via her watch.    Will follow-up in 4 weeks.     Makenna Snell. Caitlin, PharmD   10/25/2022  11:36 EDT

## 2022-10-25 NOTE — TELEPHONE ENCOUNTER
Patient left a message on medline asking if you could call her in some Zofran. She is having some nausea from the Wegovy and the weight loss clinic told her to see if you could send Zofran in to help. Thank you.

## 2022-10-26 RX ORDER — ONDANSETRON 4 MG/1
4 TABLET, ORALLY DISINTEGRATING ORAL EVERY 12 HOURS PRN
Qty: 45 TABLET | Refills: 1 | Status: SHIPPED | OUTPATIENT
Start: 2022-10-26

## 2022-10-28 DIAGNOSIS — E03.9 HYPOTHYROIDISM, UNSPECIFIED TYPE: ICD-10-CM

## 2022-10-28 DIAGNOSIS — Z76.0 MEDICATION REFILL: ICD-10-CM

## 2022-10-29 RX ORDER — LEVOTHYROXINE SODIUM 0.05 MG/1
50 TABLET ORAL DAILY
Qty: 90 TABLET | Refills: 0 | Status: SHIPPED | OUTPATIENT
Start: 2022-10-29 | End: 2023-02-01 | Stop reason: SDUPTHER

## 2022-11-02 ENCOUNTER — TELEMEDICINE (OUTPATIENT)
Dept: FAMILY MEDICINE CLINIC | Age: 52
End: 2022-11-02

## 2022-11-02 DIAGNOSIS — R94.4 DECREASED GFR: ICD-10-CM

## 2022-11-02 DIAGNOSIS — E78.5 DYSLIPIDEMIA: ICD-10-CM

## 2022-11-02 DIAGNOSIS — E03.9 HYPOTHYROIDISM, UNSPECIFIED TYPE: ICD-10-CM

## 2022-11-02 DIAGNOSIS — E66.3 OVERWEIGHT WITH BODY MASS INDEX (BMI) OF 29 TO 29.9 IN ADULT: Primary | ICD-10-CM

## 2022-11-02 PROCEDURE — 99213 OFFICE O/P EST LOW 20 MIN: CPT | Performed by: NURSE PRACTITIONER

## 2022-11-02 NOTE — PROGRESS NOTES
Subjective   Jaquan Carlson is a 52 y.o. female.     History of Present Illness  Patient presents to the clinic for follow-up in the weight loss clinic.  As of today patient is down 31 pounds and currently taking Saxenda without issues or concerns.  Patient states she does have occasional nausea but it is quick to subside.  Patient voices being very happy with her results and the weight loss program.  Follow-up labs ordered to compare with baseline.  Denies acute illness or further concerns at this time..    This provider is located in [facility] and is the established PCP for the primary care office within the [facility]. Pt is being seen today remotely through telehealth via ZOOM. Pt is being seen from personal location of choice and confirms that they are in a secure environment for this session. The patient's condition being diagnosed/treated is appropriate for telemedicine. Provider identified as PAVAN Khalil. Patient gave consent to be seen remotely. When consent is given, they understand that the consent allows for patient identifiable information to be sent to a third party as needed. They may refuse to be seen remotely at any time.The electronic data is encrypted and password protected, and the patient has been advised of the potential risks to privacy not withstanding those measures.         The following portions of the patient's history were reviewed and updated as appropriate: allergies, current medications, past family history, past medical history, past social history, past surgical history and problem list.    Review of Systems  See history of Present Illness     Objective     Virtual Visit Physical Exam    No flowsheet data found.    BMI is >= 25 and <30. (Overweight) The following options were offered after discussion;: exercise counseling/recommendations, nutrition counseling/recommendations and referral to a nutritionist   (Normal BMI:  18.5-24.9, OW 25-29.9, Obesity 30 or  greater)      Assessment & Plan     Problems Addressed this Visit        Cardiac and Vasculature    Dyslipidemia    Relevant Orders    Hemoglobin A1c    Lipid Panel       Endocrine and Metabolic    Hypothyroidism    Relevant Orders    T4, Free    TSH       Genitourinary and Reproductive     Decreased GFR    Relevant Orders    Comprehensive Metabolic Panel       Other    Overweight with body mass index (BMI) of 29 to 29.9 in adult - Primary    Relevant Orders    CBC & Differential    Comprehensive Metabolic Panel    Hemoglobin A1c    Lipid Panel    T4, Free    TSH   Diagnoses       Codes Comments    Overweight with body mass index (BMI) of 29 to 29.9 in adult    -  Primary ICD-10-CM: E66.3, Z68.29  ICD-9-CM: 278.02, V85.25     Dyslipidemia     ICD-10-CM: E78.5  ICD-9-CM: 272.4     Hypothyroidism, unspecified type     ICD-10-CM: E03.9  ICD-9-CM: 244.9     Decreased GFR     ICD-10-CM: R94.4  ICD-9-CM: 794.4                  This document has been electronically signed by PAVAN Rojas  November 2, 2022 08:16 EDT    Part of this note may be an electronic transcription/translation of spoken language to printed text using the Dragon Dictation System.

## 2022-11-17 ENCOUNTER — DISEASE STATE MANAGEMENT VISIT (OUTPATIENT)
Dept: PHARMACY | Facility: HOSPITAL | Age: 52
End: 2022-11-17

## 2022-11-17 VITALS
SYSTOLIC BLOOD PRESSURE: 126 MMHG | DIASTOLIC BLOOD PRESSURE: 79 MMHG | WEIGHT: 170.6 LBS | BODY MASS INDEX: 29.28 KG/M2 | HEART RATE: 90 BPM

## 2022-11-17 NOTE — PROGRESS NOTES
[facility]  Weight Management Program        Jaquan Carlson is a 52 y.o. female referred to the [facility] by Adonis Erwin for clinical pharmacy and specialty pharmacy management of GLP-1 Receptor Agonists for weight management.  Jaquan Carlson has previously tried Saxenda, Adipex, Wegovy, Mounjaro and lifestyle changes for weight loss.  Current weight loss efforts includes Wegovy. The patient denies a personal history or family history of thyroid cancer and denies a personal history of pancreatitis. Denies hoarseness/swelling/lumps in throat or severe abdominal pain.     Patient switched from Mounjaro to Wegovy 2.4 mg and severely vomited a few hours after, vomited several times and had persistent nausea that is not tolerable to function throughout the day. The patient decreased down to 1.7 mg and has not had side effects and is tolerating well. Her first injection some medication leaked out, but the patient has been able to improve her technique and properly inject the medication with no issues. She has 1 pen of 1.7 mg and 3 pens of 2.4 mg. Patient gives her injections on Friday. She would like to use her last 1.7 mg this week and then increase to 2.4 mg. Patient states she will have enough medication until next visit and does not need an injections. Denies any missed doses of medications. She has been able to meet her SMART goals and would like to continue achieving these goals. Patient discussed having a gym membership and is considering starting going back again.       Relevant Past Medical History and Co-morbidities  Past Medical History:   Diagnosis Date   • Diarrhea    • Hypertensive disorder 10/7/2021   • Hypothyroidism 12/6/2021   • Umbilical hernia      Social History     Socioeconomic History   • Marital status:    Tobacco Use   • Smoking status: Never   • Smokeless tobacco: Never   Substance and Sexual Activity   • Alcohol use: No   • Drug use: No   •  Sexual activity: Defer     Birth control/protection: Surgical       Allergies  Penicillins    Current Medication List    Current Outpatient Medications:   •  levothyroxine (Synthroid) 50 MCG tablet, Take 1 tablet by mouth Daily., Disp: 90 tablet, Rfl: 0  •  lisinopril (PRINIVIL,ZESTRIL) 10 MG tablet, Take 1 tablet by mouth Daily., Disp: 90 tablet, Rfl: 0  •  Loratadine 10 MG capsule, Take 10 mg by mouth Daily., Disp: , Rfl:   •  Omega-3 Fatty Acids (fish oil) 1000 MG capsule capsule, Take 1 capsule by mouth 2 (Two) Times a Day With Meals., Disp: 60 capsule, Rfl: 5  •  ondansetron ODT (ZOFRAN-ODT) 4 MG disintegrating tablet, Dissolve 1 tablet on the tongue Every 12 (Twelve) Hours As Needed for Nausea or Vomiting., Disp: 45 tablet, Rfl: 1  •  Semaglutide-Weight Management (Wegovy) 1.7 MG/0.75ML solution auto-injector, Inject 0.75 mL under the skin into the appropriate area as directed 1 (One) Time Per Week., Disp: 3 mL, Rfl: 0  •  vitamin D (ERGOCALCIFEROL) 1.25 MG (17616 UT) capsule capsule, TAKE 1 CAPSULE BY MOUTH WEEKLY (Patient taking differently: Take 50,000 Units by mouth Every 30 (Thirty) Days.), Disp: 4 capsule, Rfl: 3    Drug Interactions  None     Relevant Laboratory Values  Lab Results   Component Value Date    CHOL 212 (H) 07/08/2022    CHLPL 199 08/15/2014    TRIG 155 (H) 07/08/2022    HDL 50 07/08/2022     (H) 07/08/2022     Vaccinations:   Patient recommended to keep up with routine vaccinations.     Goals of Therapy  • Clinical Goals or Therapeutic Targets: 10% weight loss goal      Date 5/2/22 6/2/22 6/30/22 7/28/22 8/25/55 9/22/22   Weight (kg) 91.4 kg  201.08lb 190.6lb 185.4lb 180lb 181lb 175.6lb   BMI kg/ 34.6 32.71 31.8 30.9 31 30   Waist Circumference (in)  42 in 41.5in 40in 40in 39.75in 39.75     Date 10/20/22 11/17/22   Weight (kg) 170 lbs 170.6 lbs   BMI kg/ 29.18 29.28   Waist Circumference (in)  38 in 37 in     Medication Assessment & Plan    Patient's current BMI is 29.28, which is  considered overweight. Patient has lost 30.46 lbs overall and 1 inch off her waist since last visit. Congratulated her on her success!    Patient has 1 pen of 1.7 mg and 3 pens of 2.4 mg and does not need a prescription this visit. No prescription sent today.    The following medications will need dose adjustments/closer monitoring once the GLP1 is started: levothyroxine     Discussed lifestyle modifications, including diet and exercise.  Patient education provided.     Worked with patient to create SMART Goal(s): Patient would like to continue these goals  1. Increase water intake to drink 80 oz of water everyday.   2. Continue to Achieve/Track 10,000 steps every day via her watch.    Will follow-up in 4 weeks.     Candace Khoury RPH   11/17/2022  08:17 EST

## 2022-11-23 ENCOUNTER — LAB (OUTPATIENT)
Dept: LAB | Facility: HOSPITAL | Age: 52
End: 2022-11-23

## 2022-11-23 DIAGNOSIS — E78.5 DYSLIPIDEMIA: ICD-10-CM

## 2022-11-23 DIAGNOSIS — R94.4 DECREASED GFR: ICD-10-CM

## 2022-11-23 DIAGNOSIS — E66.3 OVERWEIGHT WITH BODY MASS INDEX (BMI) OF 29 TO 29.9 IN ADULT: ICD-10-CM

## 2022-11-23 DIAGNOSIS — E03.9 HYPOTHYROIDISM, UNSPECIFIED TYPE: ICD-10-CM

## 2022-11-23 LAB
ALBUMIN SERPL-MCNC: 4.1 G/DL (ref 3.5–5.2)
ALBUMIN/GLOB SERPL: 1.4 G/DL
ALP SERPL-CCNC: 72 U/L (ref 39–117)
ALT SERPL W P-5'-P-CCNC: 21 U/L (ref 1–33)
ANION GAP SERPL CALCULATED.3IONS-SCNC: 11 MMOL/L (ref 5–15)
AST SERPL-CCNC: 19 U/L (ref 1–32)
BILIRUB SERPL-MCNC: 0.3 MG/DL (ref 0–1.2)
BUN SERPL-MCNC: 13 MG/DL (ref 6–20)
BUN/CREAT SERPL: 11.3 (ref 7–25)
CALCIUM SPEC-SCNC: 9.3 MG/DL (ref 8.6–10.5)
CHLORIDE SERPL-SCNC: 105 MMOL/L (ref 98–107)
CHOLEST SERPL-MCNC: 193 MG/DL (ref 0–200)
CO2 SERPL-SCNC: 24 MMOL/L (ref 22–29)
CREAT SERPL-MCNC: 1.15 MG/DL (ref 0.57–1)
DEPRECATED RDW RBC AUTO: 43 FL (ref 37–54)
EGFRCR SERPLBLD CKD-EPI 2021: 57.1 ML/MIN/1.73
ERYTHROCYTE [DISTWIDTH] IN BLOOD BY AUTOMATED COUNT: 13.1 % (ref 12.3–15.4)
GLOBULIN UR ELPH-MCNC: 3 GM/DL
GLUCOSE SERPL-MCNC: 84 MG/DL (ref 65–99)
HBA1C MFR BLD: 5.6 % (ref 4.8–5.6)
HCT VFR BLD AUTO: 45.8 % (ref 34–46.6)
HDLC SERPL-MCNC: 52 MG/DL (ref 40–60)
HGB BLD-MCNC: 15.2 G/DL (ref 12–15.9)
LDLC SERPL CALC-MCNC: 118 MG/DL (ref 0–100)
LDLC/HDLC SERPL: 2.21 {RATIO}
LYMPHOCYTES # BLD MANUAL: 3.64 10*3/MM3 (ref 0.7–3.1)
LYMPHOCYTES NFR BLD MANUAL: 2 % (ref 5–12)
MCH RBC QN AUTO: 29.9 PG (ref 26.6–33)
MCHC RBC AUTO-ENTMCNC: 33.2 G/DL (ref 31.5–35.7)
MCV RBC AUTO: 90 FL (ref 79–97)
MONOCYTES # BLD: 0.13 10*3/MM3 (ref 0.1–0.9)
NEUTROPHILS # BLD AUTO: 2.51 10*3/MM3 (ref 1.7–7)
NEUTROPHILS NFR BLD MANUAL: 40 % (ref 42.7–76)
NRBC BLD AUTO-RTO: 0 /100 WBC (ref 0–0.2)
PLAT MORPH BLD: NORMAL
PLATELET # BLD AUTO: 349 10*3/MM3 (ref 140–450)
PMV BLD AUTO: 10 FL (ref 6–12)
POTASSIUM SERPL-SCNC: 4.5 MMOL/L (ref 3.5–5.2)
PROT SERPL-MCNC: 7.1 G/DL (ref 6–8.5)
RBC # BLD AUTO: 5.09 10*6/MM3 (ref 3.77–5.28)
RBC MORPH BLD: NORMAL
SODIUM SERPL-SCNC: 140 MMOL/L (ref 136–145)
T4 FREE SERPL-MCNC: 1.34 NG/DL (ref 0.93–1.7)
TRIGL SERPL-MCNC: 130 MG/DL (ref 0–150)
TSH SERPL DL<=0.05 MIU/L-ACNC: 2.19 UIU/ML (ref 0.27–4.2)
VARIANT LYMPHS NFR BLD MANUAL: 58 % (ref 19.6–45.3)
VLDLC SERPL-MCNC: 23 MG/DL (ref 5–40)
WBC MORPH BLD: NORMAL
WBC NRBC COR # BLD: 6.27 10*3/MM3 (ref 3.4–10.8)

## 2022-11-23 PROCEDURE — 83036 HEMOGLOBIN GLYCOSYLATED A1C: CPT

## 2022-11-23 PROCEDURE — 84439 ASSAY OF FREE THYROXINE: CPT

## 2022-11-23 PROCEDURE — 36415 COLL VENOUS BLD VENIPUNCTURE: CPT

## 2022-11-23 PROCEDURE — 80061 LIPID PANEL: CPT

## 2022-11-23 PROCEDURE — 80050 GENERAL HEALTH PANEL: CPT

## 2022-11-23 PROCEDURE — 85007 BL SMEAR W/DIFF WBC COUNT: CPT

## 2022-11-29 DIAGNOSIS — E66.9 CLASS 1 OBESITY WITH BODY MASS INDEX (BMI) OF 33.0 TO 33.9 IN ADULT, UNSPECIFIED OBESITY TYPE, UNSPECIFIED WHETHER SERIOUS COMORBIDITY PRESENT: ICD-10-CM

## 2022-11-29 DIAGNOSIS — E78.5 DYSLIPIDEMIA: ICD-10-CM

## 2022-11-29 DIAGNOSIS — E55.9 VITAMIN D DEFICIENCY: ICD-10-CM

## 2022-11-30 RX ORDER — CHLORAL HYDRATE 500 MG
1000 CAPSULE ORAL 2 TIMES DAILY WITH MEALS
Qty: 60 CAPSULE | Refills: 5 | Status: SHIPPED | OUTPATIENT
Start: 2022-11-30

## 2022-11-30 RX ORDER — ERGOCALCIFEROL 1.25 MG/1
CAPSULE ORAL
Qty: 4 CAPSULE | Refills: 3 | Status: SHIPPED | OUTPATIENT
Start: 2022-11-30

## 2022-12-08 DIAGNOSIS — I10 ESSENTIAL (PRIMARY) HYPERTENSION: ICD-10-CM

## 2022-12-08 DIAGNOSIS — Z76.0 MEDICATION REFILL: ICD-10-CM

## 2022-12-08 RX ORDER — LISINOPRIL 10 MG/1
10 TABLET ORAL DAILY
Qty: 90 TABLET | Refills: 0 | Status: CANCELLED | OUTPATIENT
Start: 2022-12-08

## 2022-12-09 DIAGNOSIS — I10 ESSENTIAL (PRIMARY) HYPERTENSION: ICD-10-CM

## 2022-12-09 DIAGNOSIS — Z76.0 MEDICATION REFILL: ICD-10-CM

## 2022-12-10 RX ORDER — LISINOPRIL 10 MG/1
10 TABLET ORAL DAILY
Qty: 90 TABLET | Refills: 0 | Status: SHIPPED | OUTPATIENT
Start: 2022-12-10 | End: 2023-03-13

## 2022-12-15 ENCOUNTER — DISEASE STATE MANAGEMENT VISIT (OUTPATIENT)
Dept: PHARMACY | Facility: HOSPITAL | Age: 52
End: 2022-12-15

## 2022-12-15 VITALS
DIASTOLIC BLOOD PRESSURE: 75 MMHG | WEIGHT: 165.8 LBS | BODY MASS INDEX: 28.46 KG/M2 | SYSTOLIC BLOOD PRESSURE: 132 MMHG | HEART RATE: 90 BPM

## 2022-12-15 RX ORDER — SEMAGLUTIDE 2.4 MG/.75ML
2.4 INJECTION, SOLUTION SUBCUTANEOUS WEEKLY
Qty: 3 ML | Refills: 0 | Status: SHIPPED | OUTPATIENT
Start: 2022-12-15 | End: 2023-01-12 | Stop reason: SDUPTHER

## 2022-12-15 NOTE — PROGRESS NOTES
[facility]  Weight Management Program        Jaquan Carlson is a 52 y.o. female referred to the [facility] by Adonis Erwin for clinical pharmacy and specialty pharmacy management of GLP-1 Receptor Agonists for weight management.  Jaquan Carlson has previously tried Saxenda, Adipex, Wegovy, Mounjaro and lifestyle changes for weight loss.  Current weight loss efforts includes Wegovy. The patient denies a personal history or family history of thyroid cancer and denies a personal history of pancreatitis.     Denies hoarseness/swelling/lumps in throat or severe abdominal pain. Denies any missed doses of medications. She is not experiencing side effects or having any issues giving injection. Patient had issue with medication leaking out during injection at previous visits, but has not had issues since. A1c in normal range now and no longer pre-diabetic, BP improved, patient is satisfied with her progress thus far.  She has been able to meet her SMART goals and would like to continue achieving these goals. Patient discussed having a gym membership and is considering starting going back again. Patient gives her injections on Fridays, used her last pen last week and is due for injection tomorrow night.      Relevant Past Medical History and Co-morbidities  Past Medical History:   Diagnosis Date   • Diarrhea    • Hypertensive disorder 10/7/2021   • Hypothyroidism 12/6/2021   • Umbilical hernia      Social History     Socioeconomic History   • Marital status:    Tobacco Use   • Smoking status: Never   • Smokeless tobacco: Never   Substance and Sexual Activity   • Alcohol use: No   • Drug use: No   • Sexual activity: Defer     Birth control/protection: Surgical       Allergies  Penicillins    Current Medication List    Current Outpatient Medications:   •  levothyroxine (Synthroid) 50 MCG tablet, Take 1 tablet by mouth Daily., Disp: 90 tablet, Rfl: 0  •  lisinopril  (PRINIVIL,ZESTRIL) 10 MG tablet, Take 1 tablet by mouth Daily., Disp: 90 tablet, Rfl: 0  •  Loratadine 10 MG capsule, Take 10 mg by mouth Daily., Disp: , Rfl:   •  Omega-3 Fatty Acids (fish oil) 1000 MG capsule capsule, Take 1 capsule by mouth 2 (Two) Times a Day With Meals., Disp: 60 capsule, Rfl: 5  •  ondansetron ODT (ZOFRAN-ODT) 4 MG disintegrating tablet, Dissolve 1 tablet on the tongue Every 12 (Twelve) Hours As Needed for Nausea or Vomiting., Disp: 45 tablet, Rfl: 1  •  Semaglutide-Weight Management (Wegovy) 1.7 MG/0.75ML solution auto-injector, Inject 0.75 mL under the skin into the appropriate area as directed 1 (One) Time Per Week., Disp: 3 mL, Rfl: 0  •  vitamin D (ERGOCALCIFEROL) 1.25 MG (79992 UT) capsule capsule, TAKE 1 CAPSULE BY MOUTH WEEKLY, Disp: 4 capsule, Rfl: 3    Drug Interactions  None     Relevant Laboratory Values  Lab Results   Component Value Date    CHOL 193 11/23/2022    CHLPL 199 08/15/2014    TRIG 130 11/23/2022    HDL 52 11/23/2022     (H) 11/23/2022     Vaccinations:   Patient recommended to keep up with routine vaccinations.     Goals of Therapy  • Clinical Goals or Therapeutic Targets: 10% weight loss goal      Date 5/2/22 6/2/22 6/30/22 7/28/22 8/25/55 9/22/22   Weight (kg) 91.4 kg  201.08lb 190.6lb 185.4lb 180lb 181lb 175.6lb   BMI kg/ 34.6 32.71 31.8 30.9 31 30   Waist Circumference (in)  42 in 41.5in 40in 40in 39.75in 39.75     Date 10/20/22 11/17/22 12/15/22   Weight (kg) 170 lbs 170.6 lbs 165.8lbs   BMI kg/ 29.18 29.28 28.46   Waist Circumference (in)  38 in 37 in 36.5 in     Medication Assessment & Plan    Patient's current BMI is 28.46, which is considered overweight. Patient has lost 4.8 lbs and 0.5 inches since last visit and 35.28 lbs overall. Congratulated her on her success!    Will order Wegovy 2.4 SC weekly.     The following medications will need dose adjustments/closer monitoring once the GLP1 is started: levothyroxine     Discussed lifestyle modifications,  including diet and exercise.  Patient education provided.     Reviewed lab results with patient, labs have improved.    Worked with patient to create SMART Goal(s): Patient would like to continue these goals  1. Increase water intake to drink 80 oz of water everyday.   2. Continue to Achieve/Track 10,000 steps every day via her watch.    Will follow-up in 4 weeks.     Candace Khoury RPH   12/15/2022  08:15 EST

## 2023-01-12 ENCOUNTER — DISEASE STATE MANAGEMENT VISIT (OUTPATIENT)
Dept: PHARMACY | Facility: HOSPITAL | Age: 53
End: 2023-01-12
Payer: COMMERCIAL

## 2023-01-12 VITALS
HEART RATE: 100 BPM | SYSTOLIC BLOOD PRESSURE: 122 MMHG | DIASTOLIC BLOOD PRESSURE: 77 MMHG | BODY MASS INDEX: 28.1 KG/M2 | HEIGHT: 64 IN | WEIGHT: 164.6 LBS

## 2023-01-12 RX ORDER — SEMAGLUTIDE 2.4 MG/.75ML
2.4 INJECTION, SOLUTION SUBCUTANEOUS WEEKLY
Qty: 3 ML | Refills: 0 | Status: SHIPPED | OUTPATIENT
Start: 2023-01-12 | End: 2023-02-09 | Stop reason: SDUPTHER

## 2023-01-12 NOTE — PROGRESS NOTES
[facility]  Weight Management Program        Jaquan Carlson is a 52 y.o. female referred to the [facility] by Adonis Erwin for clinical pharmacy and specialty pharmacy management of GLP-1 Receptor Agonists for weight management.  Jaquan Carlson has previously tried Saxenda, Adipex, Wegovy, Mounjaro and lifestyle changes for weight loss.  Current weight loss efforts includes Wegovy. The patient denies a personal history or family history of thyroid cancer and denies a personal history of pancreatitis.     Denies hoarseness/swelling/lumps in throat or severe abdominal pain. Denies any missed doses of medications. She is not experiencing side effects or having any issues giving injection.  She has been able to meet her SMART goals and would like to continue achieving these goals. Patient reported that she will think over the next 4 weeks about a new goal to target as well.  Patient gives her injections on Fridays, used her last pen last week and is due for injection tomorrow night. Patient reports at this time that her goal with therapy is still to have some weight loss as she would like to be at a BMI of normal weight.       Relevant Past Medical History and Co-morbidities  Past Medical History:   Diagnosis Date   • Diarrhea    • Hypertensive disorder 10/7/2021   • Hypothyroidism 12/6/2021   • Umbilical hernia      Social History     Socioeconomic History   • Marital status:    Tobacco Use   • Smoking status: Never   • Smokeless tobacco: Never   Substance and Sexual Activity   • Alcohol use: No   • Drug use: No   • Sexual activity: Defer     Birth control/protection: Surgical       Allergies  Penicillins    Current Medication List    Current Outpatient Medications:   •  levothyroxine (Synthroid) 50 MCG tablet, Take 1 tablet by mouth Daily., Disp: 90 tablet, Rfl: 0  •  lisinopril (PRINIVIL,ZESTRIL) 10 MG tablet, Take 1 tablet by mouth Daily., Disp: 90 tablet, Rfl:  "0  •  Loratadine 10 MG capsule, Take 10 mg by mouth Daily., Disp: , Rfl:   •  Omega-3 Fatty Acids (fish oil) 1000 MG capsule capsule, Take 1 capsule by mouth 2 (Two) Times a Day With Meals., Disp: 60 capsule, Rfl: 5  •  ondansetron ODT (ZOFRAN-ODT) 4 MG disintegrating tablet, Dissolve 1 tablet on the tongue Every 12 (Twelve) Hours As Needed for Nausea or Vomiting., Disp: 45 tablet, Rfl: 1  •  Semaglutide-Weight Management (Wegovy) 2.4 MG/0.75ML solution auto-injector, Inject 2.4 mg under the skin into the appropriate area as directed 1 (One) Time Per Week., Disp: 3 mL, Rfl: 0  •  vitamin D (ERGOCALCIFEROL) 1.25 MG (55487 UT) capsule capsule, TAKE 1 CAPSULE BY MOUTH WEEKLY, Disp: 4 capsule, Rfl: 3    Drug Interactions  None     Relevant Laboratory Values  Lab Results   Component Value Date    CHOL 193 11/23/2022    CHLPL 199 08/15/2014    TRIG 130 11/23/2022    HDL 52 11/23/2022     (H) 11/23/2022     Vaccinations:   Patient recommended to keep up with routine vaccinations.     Goals of Therapy  • Clinical Goals or Therapeutic Targets: 10% weight loss goal      Date 5/2/22 6/2/22 6/30/22 7/28/22 8/25/55 9/22/22   Weight (kg) 91.4 kg  201.08lb 190.6lb 185.4lb 180lb 181lb 175.6lb   BMI kg/ 34.6 32.71 31.8 30.9 31 30   Waist Circumference (in)  42 in 41.5in 40in 40in 39.75in 39.75     Date 10/20/22 11/17/22 12/15/22 1/12/23   Weight (kg) 170 lbs 170.6 lbs 165.8lbs 164.6 lbs   BMI kg/ 29.18 29.28 28.46 28.25   Waist Circumference (in)  38 in 37 in 36.5 in 37.75\"     Medication Assessment & Plan    Patient's current BMI is 28.25, which is considered overweight. Patient has lost 36.48 lbs overall. Congratulated her on her success!    Will order Wegovy 2.4 SC weekly.     The following medications will need dose adjustments/closer monitoring once the GLP1 is started: levothyroxine     Discussed lifestyle modifications, including diet and exercise.  Patient education provided.     Have encouraged patient to reach out " for new referring provider prior to next visit. She was provided information for Nicole Garcia if interested.     Reviewed lab results with patient, labs have improved.    Worked with patient to create SMART Goal(s): Patient would like to continue these goals  1. Increase water intake to drink 80 oz of water everyday.   2. Continue to Achieve/Track 10,000 steps every day via her watch.    Will follow-up in 4 weeks.     Makenna Snell. Caitlin, PharmD   1/12/2023  08:38 EST

## 2023-01-18 ENCOUNTER — OFFICE VISIT (OUTPATIENT)
Dept: FAMILY MEDICINE CLINIC | Facility: CLINIC | Age: 53
End: 2023-01-18
Payer: COMMERCIAL

## 2023-01-18 VITALS
HEIGHT: 64 IN | SYSTOLIC BLOOD PRESSURE: 120 MMHG | OXYGEN SATURATION: 95 % | HEART RATE: 98 BPM | WEIGHT: 167.8 LBS | BODY MASS INDEX: 28.65 KG/M2 | TEMPERATURE: 97.8 F | DIASTOLIC BLOOD PRESSURE: 70 MMHG

## 2023-01-18 DIAGNOSIS — E03.9 HYPOTHYROIDISM, UNSPECIFIED TYPE: ICD-10-CM

## 2023-01-18 DIAGNOSIS — E66.3 OVERWEIGHT WITH BODY MASS INDEX (BMI) OF 29 TO 29.9 IN ADULT: Primary | ICD-10-CM

## 2023-01-18 PROCEDURE — 99214 OFFICE O/P EST MOD 30 MIN: CPT | Performed by: FAMILY MEDICINE

## 2023-02-01 DIAGNOSIS — Z76.0 MEDICATION REFILL: ICD-10-CM

## 2023-02-01 DIAGNOSIS — E03.9 HYPOTHYROIDISM, UNSPECIFIED TYPE: ICD-10-CM

## 2023-02-02 RX ORDER — LEVOTHYROXINE SODIUM 0.05 MG/1
50 TABLET ORAL DAILY
Qty: 90 TABLET | Refills: 0 | Status: SHIPPED | OUTPATIENT
Start: 2023-02-02

## 2023-02-09 ENCOUNTER — DISEASE STATE MANAGEMENT VISIT (OUTPATIENT)
Dept: PHARMACY | Facility: HOSPITAL | Age: 53
End: 2023-02-09
Payer: COMMERCIAL

## 2023-02-09 VITALS
HEIGHT: 64 IN | DIASTOLIC BLOOD PRESSURE: 81 MMHG | HEART RATE: 109 BPM | WEIGHT: 162.6 LBS | BODY MASS INDEX: 27.76 KG/M2 | SYSTOLIC BLOOD PRESSURE: 132 MMHG

## 2023-02-09 RX ORDER — SEMAGLUTIDE 2.4 MG/.75ML
2.4 INJECTION, SOLUTION SUBCUTANEOUS WEEKLY
Qty: 3 ML | Refills: 0 | Status: SHIPPED | OUTPATIENT
Start: 2023-02-09 | End: 2023-03-09 | Stop reason: SDUPTHER

## 2023-02-09 NOTE — PROGRESS NOTES
[facility]  Weight Management Program        Jaquan Carlson is a 52 y.o. 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  Jaquan Carlson has previously tried Saxenda, Adipex, Wegovy, Mounjaro and lifestyle changes for weight loss.  Current weight loss efforts includes Wegovy. The patient denies a personal history or family history of thyroid cancer and denies a personal history of pancreatitis. Denies hoarseness/swelling/lumps in throat or severe abdominal pain.     Patient continues on Wegovy 2.4 mg. Denies any missed doses of medications. She is not experiencing side effects or having any issues giving injection.  She has been able to meet her SMART goals and would like to continue achieving these goals.  Patient gives her injections on Fridays, used her last pen last week and is due for injection tomorrow night. Patient reports at this time that her goal with therapy is still to have some weight loss as she would like to be at a BMI of normal weight.       Relevant Past Medical History and Co-morbidities  Past Medical History:   Diagnosis Date   • Diarrhea    • Hypertensive disorder 10/7/2021   • Hypothyroidism 12/6/2021   • Umbilical hernia      Social History     Socioeconomic History   • Marital status:    Tobacco Use   • Smoking status: Never   • Smokeless tobacco: Never   Substance and Sexual Activity   • Alcohol use: No   • Drug use: No   • Sexual activity: Yes     Partners: Male     Birth control/protection: Surgical       Allergies  Penicillins    Current Medication List    Current Outpatient Medications:   •  levothyroxine (Synthroid) 50 MCG tablet, Take 1 tablet by mouth Daily., Disp: 90 tablet, Rfl: 0  •  lisinopril (PRINIVIL,ZESTRIL) 10 MG tablet, Take 1 tablet by mouth Daily., Disp: 90 tablet,  "Rfl: 0  •  Loratadine 10 MG capsule, Take 10 mg by mouth Daily., Disp: , Rfl:   •  Omega-3 Fatty Acids (fish oil) 1000 MG capsule capsule, Take 1 capsule by mouth 2 (Two) Times a Day With Meals., Disp: 60 capsule, Rfl: 5  •  ondansetron ODT (ZOFRAN-ODT) 4 MG disintegrating tablet, Dissolve 1 tablet on the tongue Every 12 (Twelve) Hours As Needed for Nausea or Vomiting., Disp: 45 tablet, Rfl: 1  •  Semaglutide-Weight Management (Wegovy) 2.4 MG/0.75ML solution auto-injector, Inject 2.4 mg under the skin into the appropriate area as directed 1 (One) Time Per Week., Disp: 3 mL, Rfl: 0  •  vitamin D (ERGOCALCIFEROL) 1.25 MG (43569 UT) capsule capsule, TAKE 1 CAPSULE BY MOUTH WEEKLY, Disp: 4 capsule, Rfl: 3    Drug Interactions  None     Relevant Laboratory Values  Lab Results   Component Value Date    CHOL 193 11/23/2022    CHLPL 199 08/15/2014    TRIG 130 11/23/2022    HDL 52 11/23/2022     (H) 11/23/2022     Vaccinations:   Patient recommended to keep up with routine vaccinations.     Goals of Therapy  • Clinical Goals or Therapeutic Targets: 10% weight loss goal      Date 5/2/22 6/2/22 6/30/22 7/28/22 8/25/55 9/22/22   Weight (kg) 91.4 kg  201.08lb 190.6lb 185.4lb 180lb 181lb 175.6lb   BMI kg/ 34.6 32.71 31.8 30.9 31 30   Waist Circumference (in)  42 in 41.5in 40in 40in 39.75in 39.75     Date 10/20/22 11/17/22 12/15/22 1/12/23 2/9/23   Weight (kg) 170 lbs 170.6 lbs 165.8lbs 164.6 lbs 162.6 lb   BMI kg/ 29.18 29.28 28.46 28.25 27.91   Waist Circumference (in)  38 in 37 in 36.5 in 37.75\" 36\"     Medication Assessment & Plan    Patient's current BMI is 27.91, which is considered overweight. Patient has lost 38.48 lbs overall. Congratulated her on her success!    Will order Wegovy 2.4 SC weekly.     The following medications will need dose adjustments/closer monitoring once the GLP1 is started: levothyroxine     Discussed lifestyle modifications, including diet and exercise.  Patient education provided.     Patient " has follow up in April with Nicole Garcia. Patient could benefit from new lab work ordered on next clinic visit in March so that she has recent lab work prior to returning to [facility] in April. Patient notified that this will most likely be the plan going forward.     Reviewed lab results with patient, labs have improved.    Worked with patient to create SMART Goal(s): Patient would like to continue these goals  1. Increase water intake to drink 80 oz of water everyday.   2. Continue to Achieve/Track 10,000 steps every day via her watch.    Will follow-up in 4 weeks.     Makenna Snell. Caitlin, PharmD   2/9/2023  08:53 EST

## 2023-03-09 ENCOUNTER — DISEASE STATE MANAGEMENT VISIT (OUTPATIENT)
Dept: PHARMACY | Facility: HOSPITAL | Age: 53
End: 2023-03-09
Payer: COMMERCIAL

## 2023-03-09 VITALS
HEART RATE: 83 BPM | HEIGHT: 64 IN | SYSTOLIC BLOOD PRESSURE: 114 MMHG | BODY MASS INDEX: 27.72 KG/M2 | DIASTOLIC BLOOD PRESSURE: 75 MMHG | WEIGHT: 162.4 LBS

## 2023-03-09 DIAGNOSIS — E66.3 OVERWEIGHT (BMI 25.0-29.9): Primary | ICD-10-CM

## 2023-03-09 RX ORDER — SEMAGLUTIDE 2.4 MG/.75ML
2.4 INJECTION, SOLUTION SUBCUTANEOUS WEEKLY
Qty: 3 ML | Refills: 0 | Status: SHIPPED | OUTPATIENT
Start: 2023-03-09 | End: 2023-04-06 | Stop reason: SDUPTHER

## 2023-03-09 NOTE — PROGRESS NOTES
[facility]  Weight Management Program        Jaquan Carlson is a 52 y.o. 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  Jaquan Carlson has previously tried Saxenda, Adipex, Wegovy, Mounjaro and lifestyle changes for weight loss.  Current weight loss efforts includes Wegovy. The patient denies a personal history or family history of thyroid cancer and denies a personal history of pancreatitis. Denies hoarseness/swelling/lumps in throat or severe abdominal pain.     Patient continues on Wegovy 2.4 mg. Denies any missed doses of medications. She is not experiencing side effects or having any issues giving injection.  She has been able to meet her SMART goals and would like to continue achieving these goals.  Patient gives her injections on Fridays, used her last pen last week and is due for injection tomorrow night. Patient reports at this time that her goal with therapy is still to have some weight loss as she would like to be at a BMI of normal weight.  Pt does report that she has had an increase in sweets with Easter candy being available.       Relevant Past Medical History and Co-morbidities  Past Medical History:   Diagnosis Date   • Diarrhea    • Hypertensive disorder 10/7/2021   • Hypothyroidism 12/6/2021   • Umbilical hernia      Social History     Socioeconomic History   • Marital status:    Tobacco Use   • Smoking status: Never   • Smokeless tobacco: Never   Substance and Sexual Activity   • Alcohol use: No   • Drug use: No   • Sexual activity: Yes     Partners: Male     Birth control/protection: Surgical       Allergies  Penicillins    Current Medication List    Current Outpatient Medications:   •  levothyroxine (Synthroid) 50 MCG tablet, Take 1 tablet by mouth Daily., Disp: 90 tablet, Rfl: 0  •   "lisinopril (PRINIVIL,ZESTRIL) 10 MG tablet, Take 1 tablet by mouth Daily., Disp: 90 tablet, Rfl: 0  •  Loratadine 10 MG capsule, Take 10 mg by mouth Daily., Disp: , Rfl:   •  Omega-3 Fatty Acids (fish oil) 1000 MG capsule capsule, Take 1 capsule by mouth 2 (Two) Times a Day With Meals., Disp: 60 capsule, Rfl: 5  •  ondansetron ODT (ZOFRAN-ODT) 4 MG disintegrating tablet, Dissolve 1 tablet on the tongue Every 12 (Twelve) Hours As Needed for Nausea or Vomiting., Disp: 45 tablet, Rfl: 1  •  Semaglutide-Weight Management (Wegovy) 2.4 MG/0.75ML solution auto-injector, Inject 2.4 mg under the skin into the appropriate area as directed 1 (One) Time Per Week., Disp: 3 mL, Rfl: 0  •  vitamin D (ERGOCALCIFEROL) 1.25 MG (87307 UT) capsule capsule, TAKE 1 CAPSULE BY MOUTH WEEKLY, Disp: 4 capsule, Rfl: 3    Drug Interactions  None     Relevant Laboratory Values  Lab Results   Component Value Date    CHOL 193 11/23/2022    CHLPL 199 08/15/2014    TRIG 130 11/23/2022    HDL 52 11/23/2022     (H) 11/23/2022     Vaccinations:   Patient recommended to keep up with routine vaccinations.     Goals of Therapy  • Clinical Goals or Therapeutic Targets: 10% weight loss goal      Date 5/2/22 6/2/22 6/30/22 7/28/22 8/25/55 9/22/22   Weight (kg) 91.4 kg  201.08lb 190.6lb 185.4lb 180lb 181lb 175.6lb   BMI kg/ 34.6 32.71 31.8 30.9 31 30   Waist Circumference (in)  42 in 41.5in 40in 40in 39.75in 39.75     Date 10/20/22 11/17/22 12/15/22 1/12/23 2/9/23 3/9/23   Weight (kg) 170 lbs 170.6 lbs 165.8lbs 164.6 lbs 162.6 lb 162.4 lb   BMI kg/ 29.18 29.28 28.46 28.25 27.91 27.88   Waist Circumference (in)  38 in 37 in 36.5 in 37.75\" 36\" 37.5\"     Medication Assessment & Plan    Patient's current BMI is 27.91, which is considered overweight. Patient has lost 38.68 lbs overall. Congratulated her on her success!    Will order Wegovy 2.4 SC weekly.     The following medications will need dose adjustments/closer monitoring once the GLP1 is started: " levothyroxine     Discussed lifestyle modifications, including diet and exercise.  Patient education provided.     Will order labs today for patient to complete prior to appt with Nicoleaba Garcia in April. Orders placed for: Lipid Panel, Hgb A1C, Thyroid Panel, CMP    Reviewed lab results with patient, labs have improved.    Worked with patient to create SMART Goal(s): Patient would like to continue these goals  1. Increase water intake to drink 80 oz of water everyday.   2. Continue to Achieve/Track 10,000 steps every day via her watch.    Will follow-up in 4 weeks.     Makenna Snell. Caitlin, PharmD   3/9/2023  08:18 EST

## 2023-03-12 DIAGNOSIS — Z76.0 MEDICATION REFILL: ICD-10-CM

## 2023-03-12 DIAGNOSIS — I10 ESSENTIAL (PRIMARY) HYPERTENSION: ICD-10-CM

## 2023-03-13 RX ORDER — LISINOPRIL 10 MG/1
10 TABLET ORAL DAILY
Qty: 90 TABLET | Refills: 0 | Status: SHIPPED | OUTPATIENT
Start: 2023-03-13

## 2023-04-06 ENCOUNTER — DISEASE STATE MANAGEMENT VISIT (OUTPATIENT)
Dept: PHARMACY | Facility: HOSPITAL | Age: 53
End: 2023-04-06
Payer: COMMERCIAL

## 2023-04-06 VITALS
WEIGHT: 163 LBS | DIASTOLIC BLOOD PRESSURE: 72 MMHG | BODY MASS INDEX: 27.98 KG/M2 | HEART RATE: 74 BPM | SYSTOLIC BLOOD PRESSURE: 119 MMHG

## 2023-04-06 RX ORDER — SEMAGLUTIDE 2.4 MG/.75ML
2.4 INJECTION, SOLUTION SUBCUTANEOUS WEEKLY
Qty: 3 ML | Refills: 0 | Status: SHIPPED | OUTPATIENT
Start: 2023-04-06

## 2023-04-06 NOTE — PROGRESS NOTES
[facility]  Weight Management Program        Jaquan Carlson is a 52 y.o. 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  Jaquan Carlson has previously tried Saxenda, Adipex, Wegovy, Mounjaro and lifestyle changes for weight loss.  Current weight loss efforts includes Wegovy. The patient denies a personal history or family history of thyroid cancer and denies a personal history of pancreatitis. Denies hoarseness/swelling/lumps in throat or severe abdominal pain.     Patient continues on Wegovy 2.4 mg. Denies any missed doses of medications. She is not experiencing side effects or having any issues giving injection.  She has been able to meet her SMART goals and would like to continue achieving these goals.      Relevant Past Medical History and Co-morbidities  Past Medical History:   Diagnosis Date   • Diarrhea    • Hypertensive disorder 10/7/2021   • Hypothyroidism 12/6/2021   • Umbilical hernia      Social History     Socioeconomic History   • Marital status:    Tobacco Use   • Smoking status: Never   • Smokeless tobacco: Never   Substance and Sexual Activity   • Alcohol use: No   • Drug use: No   • Sexual activity: Yes     Partners: Male     Birth control/protection: Surgical       Allergies  Penicillins    Current Medication List    Current Outpatient Medications:   •  levothyroxine (Synthroid) 50 MCG tablet, Take 1 tablet by mouth Daily., Disp: 90 tablet, Rfl: 0  •  lisinopril (PRINIVIL,ZESTRIL) 10 MG tablet, Take 1 tablet by mouth Daily., Disp: 90 tablet, Rfl: 0  •  Loratadine 10 MG capsule, Take 1 capsule by mouth Daily., Disp: , Rfl:   •  Omega-3 Fatty Acids (fish oil) 1000 MG capsule capsule, Take 1 capsule by mouth 2 (Two) Times a Day With Meals., Disp: 60 capsule, Rfl: 5  •  ondansetron ODT (ZOFRAN-ODT) 4 MG disintegrating tablet, Dissolve 1  "tablet on the tongue Every 12 (Twelve) Hours As Needed for Nausea or Vomiting., Disp: 45 tablet, Rfl: 1  •  Semaglutide-Weight Management (Wegovy) 2.4 MG/0.75ML solution auto-injector, Inject 2.4 mg under the skin into the appropriate area as directed 1 (One) Time Per Week., Disp: 3 mL, Rfl: 0  •  vitamin D (ERGOCALCIFEROL) 1.25 MG (26159 UT) capsule capsule, TAKE 1 CAPSULE BY MOUTH WEEKLY, Disp: 4 capsule, Rfl: 3    Drug Interactions  None     Relevant Laboratory Values  Lab Results   Component Value Date    CHOL 193 11/23/2022    CHLPL 199 08/15/2014    TRIG 130 11/23/2022    HDL 52 11/23/2022     (H) 11/23/2022     Vaccinations:   Patient recommended to keep up with routine vaccinations.     Goals of Therapy  • Clinical Goals or Therapeutic Targets: 10% weight loss goal      Date 5/2/22 6/2/22 6/30/22 7/28/22 8/25/55 9/22/22   Weight (kg) 91.4 kg  201.08lb 190.6lb 185.4lb 180lb 181lb 175.6lb   BMI kg/ 34.6 32.71 31.8 30.9 31 30   Waist Circumference (in)  42 in 41.5in 40in 40in 39.75in 39.75     Date 10/20/22 11/17/22 12/15/22 1/12/23 2/9/23 3/9/23 4/6/23   Weight (kg) 170 lbs 170.6 lbs 165.8lbs 164.6 lbs 162.6 lb 162.4 lb 163 lb   BMI kg/ 29.18 29.28 28.46 28.25 27.91 27.88 27.98   Waist Circumference (in)  38 in 37 in 36.5 in 37.75\" 36\" 37.5\" 36\"     Medication Assessment & Plan    Patient's current BMI is 27.98, which is considered overweight. Patient has lost 38 lbs overall. Congratulated her on her success!    Will order Wegovy 2.4 SC weekly.     The following medications will need dose adjustments/closer monitoring once the GLP1 is started: levothyroxine     Discussed lifestyle modifications, including diet and exercise.  Patient education provided.     Reminded her to get labs prior to visit with provider.     Worked with patient to create SMART Goal(s): Patient would like to continue these goals  1. Increase water intake to drink 80 oz of water everyday.   2. Continue to Achieve/Track 10,000 steps " every day via her watch.  3. Begin weight bearing exercise at least 2 days/week.     Will follow-up in 4 weeks.     Gema Villa, PharmD   4/6/2023  08:30 EDT

## 2023-05-02 ENCOUNTER — LAB (OUTPATIENT)
Dept: LAB | Facility: HOSPITAL | Age: 53
End: 2023-05-02
Payer: COMMERCIAL

## 2023-05-02 DIAGNOSIS — E03.9 HYPOTHYROIDISM, UNSPECIFIED TYPE: ICD-10-CM

## 2023-05-02 DIAGNOSIS — Z76.0 MEDICATION REFILL: ICD-10-CM

## 2023-05-02 DIAGNOSIS — E66.3 OVERWEIGHT (BMI 25.0-29.9): ICD-10-CM

## 2023-05-02 LAB
ALBUMIN SERPL-MCNC: 4.5 G/DL (ref 3.5–5.2)
ALBUMIN/GLOB SERPL: 1.6 G/DL
ALP SERPL-CCNC: 80 U/L (ref 39–117)
ALT SERPL W P-5'-P-CCNC: 22 U/L (ref 1–33)
ANION GAP SERPL CALCULATED.3IONS-SCNC: 10 MMOL/L (ref 5–15)
AST SERPL-CCNC: 19 U/L (ref 1–32)
BILIRUB SERPL-MCNC: 0.2 MG/DL (ref 0–1.2)
BUN SERPL-MCNC: 15 MG/DL (ref 6–20)
BUN/CREAT SERPL: 13.3 (ref 7–25)
CALCIUM SPEC-SCNC: 9.5 MG/DL (ref 8.6–10.5)
CHLORIDE SERPL-SCNC: 104 MMOL/L (ref 98–107)
CHOLEST SERPL-MCNC: 218 MG/DL (ref 0–200)
CO2 SERPL-SCNC: 26 MMOL/L (ref 22–29)
CREAT SERPL-MCNC: 1.13 MG/DL (ref 0.57–1)
EGFRCR SERPLBLD CKD-EPI 2021: 58.3 ML/MIN/1.73
GLOBULIN UR ELPH-MCNC: 2.9 GM/DL
GLUCOSE SERPL-MCNC: 92 MG/DL (ref 65–99)
HBA1C MFR BLD: 5.4 % (ref 4.8–5.6)
HDLC SERPL-MCNC: 60 MG/DL (ref 40–60)
LDLC SERPL CALC-MCNC: 137 MG/DL (ref 0–100)
LDLC/HDLC SERPL: 2.24 {RATIO}
POTASSIUM SERPL-SCNC: 4.3 MMOL/L (ref 3.5–5.2)
PROT SERPL-MCNC: 7.4 G/DL (ref 6–8.5)
SODIUM SERPL-SCNC: 140 MMOL/L (ref 136–145)
T-UPTAKE NFR SERPL: 0.99 TBI (ref 0.8–1.3)
T4 SERPL-MCNC: 7.71 MCG/DL (ref 4.5–11.7)
TRIGL SERPL-MCNC: 119 MG/DL (ref 0–150)
TSH SERPL DL<=0.05 MIU/L-ACNC: 1.69 UIU/ML (ref 0.27–4.2)
VLDLC SERPL-MCNC: 21 MG/DL (ref 5–40)

## 2023-05-02 PROCEDURE — 80053 COMPREHEN METABOLIC PANEL: CPT

## 2023-05-02 PROCEDURE — 83036 HEMOGLOBIN GLYCOSYLATED A1C: CPT

## 2023-05-02 PROCEDURE — 84436 ASSAY OF TOTAL THYROXINE: CPT

## 2023-05-02 PROCEDURE — 84443 ASSAY THYROID STIM HORMONE: CPT

## 2023-05-02 PROCEDURE — 84479 ASSAY OF THYROID (T3 OR T4): CPT

## 2023-05-02 PROCEDURE — 80061 LIPID PANEL: CPT

## 2023-05-02 PROCEDURE — 36415 COLL VENOUS BLD VENIPUNCTURE: CPT

## 2023-05-03 RX ORDER — LEVOTHYROXINE SODIUM 0.05 MG/1
50 TABLET ORAL DAILY
Qty: 90 TABLET | Refills: 0 | Status: SHIPPED | OUTPATIENT
Start: 2023-05-03

## 2023-05-04 ENCOUNTER — TELEPHONE (OUTPATIENT)
Dept: FAMILY MEDICINE CLINIC | Facility: CLINIC | Age: 53
End: 2023-05-04
Payer: COMMERCIAL

## 2023-05-04 ENCOUNTER — DISEASE STATE MANAGEMENT VISIT (OUTPATIENT)
Dept: PHARMACY | Facility: HOSPITAL | Age: 53
End: 2023-05-04
Payer: COMMERCIAL

## 2023-05-04 VITALS
HEART RATE: 75 BPM | SYSTOLIC BLOOD PRESSURE: 124 MMHG | HEIGHT: 64 IN | WEIGHT: 162 LBS | BODY MASS INDEX: 27.66 KG/M2 | DIASTOLIC BLOOD PRESSURE: 74 MMHG

## 2023-05-04 RX ORDER — SEMAGLUTIDE 2.4 MG/.75ML
2.4 INJECTION, SOLUTION SUBCUTANEOUS WEEKLY
Qty: 3 ML | Refills: 0 | Status: SHIPPED | OUTPATIENT
Start: 2023-05-04

## 2023-05-04 NOTE — TELEPHONE ENCOUNTER
----- Message from PAVAN Castaneda sent at 5/4/2023  3:44 PM EDT -----  Please let patient know results are stable, continue to increase fluids and work on diet. Thank you.       Left a message to return call.    Hub to read.      Patient notified & verbalized understanding.

## 2023-05-04 NOTE — PROGRESS NOTES
[facility]  Weight Management Program        Jaquan Carlson is a 52 y.o. 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  Jaquan Carlson has previously tried Saxenda, Adipex, Wegovy, Mounjaro and lifestyle changes for weight loss.  Current weight loss efforts includes Wegovy. The patient denies a personal history or family history of thyroid cancer and denies a personal history of pancreatitis. Denies hoarseness/swelling/lumps in throat or severe abdominal pain.     Patient continues on Wegovy 2.4 mg. Denies any missed doses of medications. She is not experiencing side effects or having any issues giving injection.  She has been able to meet her SMART goals and would like to continue achieving these goals.      Relevant Past Medical History and Co-morbidities  Past Medical History:   Diagnosis Date   • Diarrhea    • Hypertensive disorder 10/7/2021   • Hypothyroidism 12/6/2021   • Umbilical hernia      Social History     Socioeconomic History   • Marital status:    Tobacco Use   • Smoking status: Never   • Smokeless tobacco: Never   Substance and Sexual Activity   • Alcohol use: No   • Drug use: No   • Sexual activity: Yes     Partners: Male     Birth control/protection: Surgical       Allergies  Penicillins    Current Medication List    Current Outpatient Medications:   •  levothyroxine (Synthroid) 50 MCG tablet, Take 1 tablet by mouth Daily., Disp: 90 tablet, Rfl: 0  •  lisinopril (PRINIVIL,ZESTRIL) 10 MG tablet, Take 1 tablet by mouth Daily., Disp: 90 tablet, Rfl: 0  •  Loratadine 10 MG capsule, Take 1 capsule by mouth Daily., Disp: , Rfl:   •  Omega-3 Fatty Acids (fish oil) 1000 MG capsule capsule, Take 1 capsule by mouth 2 (Two) Times a Day With Meals., Disp: 60 capsule, Rfl: 5  •  ondansetron ODT (ZOFRAN-ODT) 4 MG disintegrating tablet, Dissolve 1  "tablet on the tongue Every 12 (Twelve) Hours As Needed for Nausea or Vomiting., Disp: 45 tablet, Rfl: 1  •  Semaglutide-Weight Management (Wegovy) 2.4 MG/0.75ML solution auto-injector, Inject 2.4 mg under the skin into the appropriate area as directed 1 (One) Time Per Week., Disp: 3 mL, Rfl: 0  •  vitamin D (ERGOCALCIFEROL) 1.25 MG (34088 UT) capsule capsule, TAKE 1 CAPSULE BY MOUTH WEEKLY, Disp: 4 capsule, Rfl: 3    Drug Interactions  None     Relevant Laboratory Values  Lab Results   Component Value Date    CHOL 218 (H) 05/02/2023    CHLPL 199 08/15/2014    TRIG 119 05/02/2023    HDL 60 05/02/2023     (H) 05/02/2023     Vaccinations:   Patient recommended to keep up with routine vaccinations.     Goals of Therapy  • Clinical Goals or Therapeutic Targets: 10% weight loss goal      Date 5/2/22 6/2/22 6/30/22 7/28/22 8/25/55 9/22/22   Weight (kg) 91.4 kg  201.08lb 190.6lb 185.4lb 180lb 181lb 175.6lb   BMI kg/ 34.6 32.71 31.8 30.9 31 30   Waist Circumference (in)  42 in 41.5in 40in 40in 39.75in 39.75     Date 10/20/22 11/17/22 12/15/22 1/12/23 2/9/23 3/9/23 4/6/23 5/4/23   Weight (kg) 170 lbs 170.6 lbs 165.8lbs 164.6 lbs 162.6 lb 162.4 lb 163 lb 162 lb   BMI kg/ 29.18 29.28 28.46 28.25 27.91 27.88 27.98 27.81   Waist Circumference (in)  38 in 37 in 36.5 in 37.75\" 36\" 37.5\" 36\" 35.5\"     Medication Assessment & Plan    Patient's current BMI is 27.81, which is considered overweight. Patient has lost 39 lbs overall. Congratulated her on her success!    Will order Wegovy 2.4 SC weekly.     The following medications will need dose adjustments/closer monitoring once the GLP1 is started: levothyroxine     Discussed lifestyle modifications, including diet and exercise.  Patient education provided.     Patient had labs drawn on 5/2/23. Discussed labs with patient, specifically LDL. Patient has an appointment with Nicole Garcia on 5/8/23 to follow-up regarding labs.    Worked with patient to create SMART Goal(s): " Patient would like to continue these goals  1. Increase water intake to drink 80 oz of water everyday.   2. Continue to Achieve/Track 10,000 steps every day via her watch.  3. Begin weight bearing exercise at least 2 days/week.     Will follow-up in 4 weeks.     Alessia Maynard, PharmD   5/4/2023  08:32 EDT

## 2023-05-08 ENCOUNTER — OFFICE VISIT (OUTPATIENT)
Dept: FAMILY MEDICINE CLINIC | Facility: CLINIC | Age: 53
End: 2023-05-08
Payer: COMMERCIAL

## 2023-05-08 VITALS
BODY MASS INDEX: 28.31 KG/M2 | DIASTOLIC BLOOD PRESSURE: 78 MMHG | SYSTOLIC BLOOD PRESSURE: 116 MMHG | WEIGHT: 165.8 LBS | TEMPERATURE: 98 F | HEART RATE: 80 BPM | HEIGHT: 64 IN | OXYGEN SATURATION: 99 %

## 2023-05-08 DIAGNOSIS — Z76.0 MEDICATION REFILL: ICD-10-CM

## 2023-05-08 DIAGNOSIS — Z12.11 SCREEN FOR COLON CANCER: Primary | ICD-10-CM

## 2023-05-08 DIAGNOSIS — I10 ESSENTIAL (PRIMARY) HYPERTENSION: ICD-10-CM

## 2023-05-08 RX ORDER — LISINOPRIL 10 MG/1
10 TABLET ORAL DAILY
Qty: 90 TABLET | Refills: 0 | Status: SHIPPED | OUTPATIENT
Start: 2023-05-08

## 2023-05-08 NOTE — PROGRESS NOTES
"Chief Complaint  Obesity (Follow up )    Subjective          Jaquan Carlson presents to [facility]  Obesity  This is a chronic problem. The current episode started more than 1 year ago. The problem has been rapidly improving. Treatments tried: wegovy. The treatment provided significant relief.   Hypertension  This is a chronic problem. The current episode started more than 1 year ago. The problem is controlled. Current antihypertension treatment includes ACE inhibitors. The current treatment provides significant improvement.       Review of Systems      Objective   Vital Signs:   /78 (BP Location: Right arm, Patient Position: Sitting)   Pulse 80   Temp 98 °F (36.7 °C) (Temporal)   Ht 162.6 cm (64.02\")   Wt 75.2 kg (165 lb 12.8 oz)   SpO2 99%   BMI 28.45 kg/m²     Physical Exam  Constitutional:       General: She is not in acute distress.     Appearance: Normal appearance. She is well-developed and well-groomed. She is not ill-appearing, toxic-appearing or diaphoretic.   HENT:      Head: Normocephalic.      Nose: Nose normal. No congestion or rhinorrhea.      Mouth/Throat:      Mouth: Mucous membranes are moist.      Pharynx: Oropharynx is clear. No oropharyngeal exudate or posterior oropharyngeal erythema.   Eyes:      General: Lids are normal.         Right eye: No discharge.         Left eye: No discharge.      Extraocular Movements: Extraocular movements intact.      Pupils: Pupils are equal, round, and reactive to light.   Neck:      Vascular: No carotid bruit.   Cardiovascular:      Rate and Rhythm: Normal rate and regular rhythm.      Pulses: Normal pulses.      Heart sounds: Normal heart sounds. No murmur heard.    No friction rub. No gallop.   Pulmonary:      Effort: Pulmonary effort is normal. No respiratory distress.      Breath sounds: Normal breath sounds. No stridor. No wheezing, rhonchi or rales.   Chest:      Chest wall: No tenderness.   Abdominal:      " General: Bowel sounds are normal. There is no distension.      Palpations: Abdomen is soft. There is no mass.      Tenderness: There is no abdominal tenderness. There is no right CVA tenderness, left CVA tenderness, guarding or rebound.      Hernia: No hernia is present.   Musculoskeletal:         General: No swelling or tenderness. Normal range of motion.      Cervical back: Normal range of motion and neck supple. No rigidity or tenderness.      Right lower leg: No edema.      Left lower leg: No edema.   Lymphadenopathy:      Cervical: No cervical adenopathy.   Skin:     General: Skin is warm.      Capillary Refill: Capillary refill takes less than 2 seconds.      Coloration: Skin is not jaundiced.      Findings: No bruising, erythema or rash.   Neurological:      General: No focal deficit present.      Mental Status: She is alert and oriented to person, place, and time.      Motor: Motor function is intact. No weakness.      Coordination: Coordination is intact.      Gait: Gait is intact. Gait normal.   Psychiatric:         Attention and Perception: Attention normal.         Mood and Affect: Mood normal.         Speech: Speech normal.         Behavior: Behavior normal.         Cognition and Memory: Cognition normal.         Judgment: Judgment normal.        Result Review :                 Assessment and Plan    Diagnoses and all orders for this visit:    1. Screen for colon cancer (Primary)  -     Ambulatory Referral For Screening Colonoscopy    2. Medication refill  -     lisinopril (PRINIVIL,ZESTRIL) 10 MG tablet; Take 1 tablet by mouth Daily.  Dispense: 90 tablet; Refill: 0    3. Essential (primary) hypertension  -     lisinopril (PRINIVIL,ZESTRIL) 10 MG tablet; Take 1 tablet by mouth Daily.  Dispense: 90 tablet; Refill: 0      Patient's Body mass index is 28.45 kg/m². indicating that she is overweight (BMI 25-29.9). Patient's (Body mass index is 28.45 kg/m².) indicates that they are overweight with health  conditions that include hypertension . Weight is improving with treatment. BMI is is above average; BMI management plan is completed. We discussed low calorie, low carb based diet program, portion control and increasing exercise. .    Follow Up   Return in about 3 months (around 8/8/2023), or if symptoms worsen or fail to improve.  Patient was given instructions and counseling regarding her condition or for health maintenance advice. Please see specific information pulled into the AVS if appropriate.     This document has been electronically signed by PAVAN Castaneda  May 10, 2023 15:31 EDT

## 2023-06-01 ENCOUNTER — DISEASE STATE MANAGEMENT VISIT (OUTPATIENT)
Dept: PHARMACY | Facility: HOSPITAL | Age: 53
End: 2023-06-01

## 2023-06-01 VITALS
SYSTOLIC BLOOD PRESSURE: 119 MMHG | BODY MASS INDEX: 28.14 KG/M2 | DIASTOLIC BLOOD PRESSURE: 74 MMHG | WEIGHT: 164 LBS | HEART RATE: 76 BPM

## 2023-06-01 DIAGNOSIS — E66.9 CLASS 1 OBESITY WITH BODY MASS INDEX (BMI) OF 33.0 TO 33.9 IN ADULT, UNSPECIFIED OBESITY TYPE, UNSPECIFIED WHETHER SERIOUS COMORBIDITY PRESENT: ICD-10-CM

## 2023-06-01 DIAGNOSIS — E78.5 DYSLIPIDEMIA: ICD-10-CM

## 2023-06-01 RX ORDER — SEMAGLUTIDE 2.4 MG/.75ML
2.4 INJECTION, SOLUTION SUBCUTANEOUS WEEKLY
Qty: 3 ML | Refills: 0 | Status: SHIPPED | OUTPATIENT
Start: 2023-06-01

## 2023-06-01 NOTE — PROGRESS NOTES
[facility]  Weight Management Program        Jaquan Carlson is a 52 y.o. 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  Jaquan Carlson has previously tried Saxenda, Adipex, Wegovy, Mounjaro and lifestyle changes for weight loss.  Current weight loss efforts includes Wegovy. The patient denies a personal history or family history of thyroid cancer and denies a personal history of pancreatitis. Denies hoarseness/swelling/lumps in throat or severe abdominal pain.     Patient continues on Wegovy 2.4 mg. Denies any missed doses of medication and takes injection every Friday night. She is not experiencing side effects or having any issues giving injection.  She has been able to meet her SMART goals and would like to continue achieving these goals.      Relevant Past Medical History and Co-morbidities  Past Medical History:   Diagnosis Date   • Diarrhea    • Hypertensive disorder 10/7/2021   • Hypothyroidism 12/6/2021   • Umbilical hernia      Social History     Socioeconomic History   • Marital status:    Tobacco Use   • Smoking status: Never   • Smokeless tobacco: Never   Substance and Sexual Activity   • Alcohol use: No   • Drug use: No   • Sexual activity: Yes     Partners: Male     Birth control/protection: Surgical       Allergies  Penicillins    Current Medication List    Current Outpatient Medications:   •  levothyroxine (Synthroid) 50 MCG tablet, Take 1 tablet by mouth Daily., Disp: 90 tablet, Rfl: 0  •  lisinopril (PRINIVIL,ZESTRIL) 10 MG tablet, Take 1 tablet by mouth Daily., Disp: 90 tablet, Rfl: 0  •  Loratadine 10 MG capsule, Take 1 capsule by mouth Daily., Disp: , Rfl:   •  Omega-3 Fatty Acids (fish oil) 1000 MG capsule capsule, Take 1 capsule by mouth 2 (Two) Times a Day With Meals., Disp: 60 capsule, Rfl: 5  •  ondansetron ODT (ZOFRAN-ODT) 4 MG  "disintegrating tablet, Dissolve 1 tablet on the tongue Every 12 (Twelve) Hours As Needed for Nausea or Vomiting., Disp: 45 tablet, Rfl: 1  •  Semaglutide-Weight Management (Wegovy) 2.4 MG/0.75ML solution auto-injector, Inject 2.4 mg under the skin into the appropriate area as directed 1 (One) Time Per Week., Disp: 3 mL, Rfl: 0  •  vitamin D (ERGOCALCIFEROL) 1.25 MG (80100 UT) capsule capsule, TAKE 1 CAPSULE BY MOUTH WEEKLY, Disp: 4 capsule, Rfl: 3    Drug Interactions  None     Relevant Laboratory Values  Lab Results   Component Value Date    CHOL 218 (H) 05/02/2023    CHLPL 199 08/15/2014    TRIG 119 05/02/2023    HDL 60 05/02/2023     (H) 05/02/2023     Vaccinations:   Patient recommended to keep up with routine vaccinations.     Goals of Therapy  • Clinical Goals or Therapeutic Targets: 10% weight loss goal      Date 5/2/22 6/2/22 6/30/22 7/28/22 8/25/55 9/22/22   Weight (kg) 91.4 kg  201.08lb 190.6lb 185.4lb 180lb 181lb 175.6lb   BMI kg/ 34.6 32.71 31.8 30.9 31 30   Waist Circumference (in)  42 in 41.5in 40in 40in 39.75in 39.75     Date 10/20/22 11/17/22 12/15/22 1/12/23 2/9/23 3/9/23 4/6/23 5/4/23 6/1/23   Weight (kg) 170 lbs 170.6 lbs 165.8lbs 164.6 lbs 162.6 lb 162.4 lb 163 lb 162 lb 164 lbs   BMI kg/ 29.18 29.28 28.46 28.25 27.91 27.88 27.98 27.81 28.14   Waist Circumference (in)  38 in 37 in 36.5 in 37.75\" 36\" 37.5\" 36\" 35.5\" 37\"     Medication Assessment & Plan    Patient's current BMI is 28.14, which is considered overweight. Patient gained 2 lbs this month, but has lost 37 lbs overall. Congratulated her on her success!    Will re-order Wegovy 2.4 SC weekly. Encouraged healthy eating, exercise, and smaller portion sizes to help maintain/lose weight.     The following medications will need dose adjustments/closer monitoring once the GLP1 is started: levothyroxine     Discussed lifestyle modifications, including diet and exercise.  Patient education provided.     Worked with patient to create SMART " Goal(s): Patient would like to continue these goals  1. Increase water intake to drink 80 oz of water everyday.   2. Continue to Achieve/Track 10,000 steps every day via her watch.  3. Begin weight bearing exercise at least 2 days/week.     Will follow-up in 4 weeks.     Candace Khoury RPH   6/1/2023  09:07 EDT

## 2023-06-02 DIAGNOSIS — E66.9 CLASS 1 OBESITY WITH BODY MASS INDEX (BMI) OF 33.0 TO 33.9 IN ADULT, UNSPECIFIED OBESITY TYPE, UNSPECIFIED WHETHER SERIOUS COMORBIDITY PRESENT: ICD-10-CM

## 2023-06-02 DIAGNOSIS — E78.5 DYSLIPIDEMIA: ICD-10-CM

## 2023-06-02 RX ORDER — CHLORAL HYDRATE 500 MG
1000 CAPSULE ORAL 2 TIMES DAILY WITH MEALS
Qty: 60 CAPSULE | Refills: 5 | OUTPATIENT
Start: 2023-06-02

## 2023-07-26 ENCOUNTER — DISEASE STATE MANAGEMENT VISIT (OUTPATIENT)
Dept: PHARMACY | Facility: HOSPITAL | Age: 53
End: 2023-07-26
Payer: COMMERCIAL

## 2023-07-26 VITALS
SYSTOLIC BLOOD PRESSURE: 106 MMHG | WEIGHT: 165.4 LBS | DIASTOLIC BLOOD PRESSURE: 71 MMHG | HEART RATE: 67 BPM | BODY MASS INDEX: 28.37 KG/M2

## 2023-07-26 RX ORDER — SEMAGLUTIDE 2.4 MG/.75ML
2.4 INJECTION, SOLUTION SUBCUTANEOUS WEEKLY
Qty: 3 ML | Refills: 0 | Status: SHIPPED | OUTPATIENT
Start: 2023-07-26

## 2023-07-26 NOTE — PROGRESS NOTES
[facility]  Weight Management Program        Jaquan Carlson is a 52 y.o. 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  Jaquan Carlson has previously tried Saxenda, Adipex, Wegovy, Mounjaro and lifestyle changes for weight loss.  Current weight loss efforts includes Wegovy. The patient denies a personal history or family history of thyroid cancer and denies a personal history of pancreatitis. Denies hoarseness/swelling/lumps in throat or severe abdominal pain.     Patient continues on Wegovy 2.4 mg. Denies any missed doses of medication and takes injection every Friday night. She is not experiencing side effects or having any issues giving injection.  She has been on vacation and attributes her weight gain to that.     Relevant Past Medical History and Co-morbidities  Past Medical History:   Diagnosis Date    Diarrhea     Hypertensive disorder 10/7/2021    Hypothyroidism 12/6/2021    Umbilical hernia      Social History     Socioeconomic History    Marital status:    Tobacco Use    Smoking status: Never    Smokeless tobacco: Never   Substance and Sexual Activity    Alcohol use: No    Drug use: No    Sexual activity: Yes     Partners: Male     Birth control/protection: Surgical       Allergies  Penicillins    Current Medication List    Current Outpatient Medications:     levothyroxine (Synthroid) 50 MCG tablet, Take 1 tablet by mouth Daily., Disp: 90 tablet, Rfl: 0    lisinopril (PRINIVIL,ZESTRIL) 10 MG tablet, Take 1 tablet by mouth Daily., Disp: 90 tablet, Rfl: 0    Loratadine 10 MG capsule, Take 1 capsule by mouth Daily., Disp: , Rfl:     Omega-3 Fatty Acids (fish oil) 1000 MG capsule capsule, Take 1 capsule by mouth 2 (Two) Times a Day With Meals., Disp: 60 capsule, Rfl: 5    ondansetron ODT (ZOFRAN-ODT) 4 MG disintegrating tablet, Dissolve 1 tablet on  "the tongue Every 12 (Twelve) Hours As Needed for Nausea or Vomiting., Disp: 45 tablet, Rfl: 1    Semaglutide-Weight Management (Wegovy) 2.4 MG/0.75ML solution auto-injector, Inject 2.4 mg under the skin into the appropriate area as directed 1 (One) Time Per Week., Disp: 3 mL, Rfl: 0    vitamin D (ERGOCALCIFEROL) 1.25 MG (40218 UT) capsule capsule, TAKE 1 CAPSULE BY MOUTH WEEKLY, Disp: 4 capsule, Rfl: 3    Drug Interactions  None     Relevant Laboratory Values  Lab Results   Component Value Date    CHOL 218 (H) 05/02/2023    CHLPL 199 08/15/2014    TRIG 119 05/02/2023    HDL 60 05/02/2023     (H) 05/02/2023     Vaccinations:   Patient recommended to keep up with routine vaccinations.     Goals of Therapy  Clinical Goals or Therapeutic Targets: 10% weight loss goal      Date 5/2/22 6/2/22 6/30/22 7/28/22 8/25/55 9/22/22   Weight (kg) 91.4 kg  201.08lb 190.6lb 185.4lb 180lb 181lb 175.6lb   BMI kg/ 34.6 32.71 31.8 30.9 31 30   Waist Circumference (in)  42 in 41.5in 40in 40in 39.75in 39.75     Date 10/20/22 11/17/22 12/15/22 1/12/23 2/9/23 3/9/23 4/6/23 5/4/23 6/1/23 6/29/23   Weight (kg) 170 lbs 170.6 lbs 165.8lbs 164.6 lbs 162.6 lb 162.4 lb 163 lb 162 lb 164 lbs 162 lb   BMI kg/ 29.18 29.28 28.46 28.25 27.91 27.88 27.98 27.81 28.14 27.79   Waist Circumference (in)  38 in 37 in 36.5 in 37.75\" 36\" 37.5\" 36\" 35.5\" 37\" 36\"     Date 7/26/23   Weight (kg) 165.4   BMI kg/ 28.37   Waist Circumference (in)  37.5 in       Medication Assessment & Plan    Patient's current BMI is 28.37, which is considered overweight. Patient has lost 35.6 lbs overall. Congratulated her on her success!    Will re-order Wegovy 2.4 SC weekly. Encouraged healthy eating, exercise, and smaller portion sizes to help maintain/lose weight.     The following medications will need dose adjustments/closer monitoring once the GLP1 is started: levothyroxine     Discussed lifestyle modifications, including diet and exercise.  Patient education provided. "     Worked with patient to create SMART Goal(s): Patient would like to continue these goals  1. Increase water intake to drink 80 oz of water everyday.   2. Continue to Achieve/Track 10,000 steps every day via her watch.  3. Begin weight bearing exercise at least 2 days/week.     Will follow-up in 4 weeks.     Jordan Rondon, Pharmacy Intern   Gema Villa, PharmD  7/26/2023  09:15 EDT

## 2023-07-29 DIAGNOSIS — Z76.0 MEDICATION REFILL: ICD-10-CM

## 2023-07-29 DIAGNOSIS — E03.9 HYPOTHYROIDISM, UNSPECIFIED TYPE: ICD-10-CM

## 2023-07-31 RX ORDER — LEVOTHYROXINE SODIUM 0.05 MG/1
50 TABLET ORAL DAILY
Qty: 90 TABLET | Refills: 0 | Status: SHIPPED | OUTPATIENT
Start: 2023-07-31

## 2023-08-15 ENCOUNTER — OFFICE VISIT (OUTPATIENT)
Dept: FAMILY MEDICINE CLINIC | Facility: CLINIC | Age: 53
End: 2023-08-15
Payer: COMMERCIAL

## 2023-08-15 VITALS
HEIGHT: 64 IN | TEMPERATURE: 97.1 F | OXYGEN SATURATION: 98 % | HEART RATE: 79 BPM | BODY MASS INDEX: 28.38 KG/M2 | WEIGHT: 166.2 LBS | SYSTOLIC BLOOD PRESSURE: 108 MMHG | DIASTOLIC BLOOD PRESSURE: 60 MMHG

## 2023-08-15 DIAGNOSIS — E66.9 CLASS 1 OBESITY WITH BODY MASS INDEX (BMI) OF 33.0 TO 33.9 IN ADULT, UNSPECIFIED OBESITY TYPE, UNSPECIFIED WHETHER SERIOUS COMORBIDITY PRESENT: ICD-10-CM

## 2023-08-15 DIAGNOSIS — E66.3 OVERWEIGHT (BMI 25.0-29.9): Primary | ICD-10-CM

## 2023-08-15 DIAGNOSIS — E78.5 DYSLIPIDEMIA: ICD-10-CM

## 2023-08-15 RX ORDER — CHLORAL HYDRATE 500 MG
1000 CAPSULE ORAL 2 TIMES DAILY WITH MEALS
Qty: 60 CAPSULE | Refills: 5 | Status: SHIPPED | OUTPATIENT
Start: 2023-08-15

## 2023-08-15 RX ORDER — CHLORAL HYDRATE 500 MG
1000 CAPSULE ORAL 2 TIMES DAILY WITH MEALS
Qty: 60 CAPSULE | Refills: 5 | Status: SHIPPED | OUTPATIENT
Start: 2023-08-15 | End: 2023-08-15 | Stop reason: SDUPTHER

## 2023-08-15 NOTE — PROGRESS NOTES
"Chief Complaint  Weight loss    Subjective          Jaquan Carlson presents to [facility]  Hyperlipidemia  This is a chronic problem. The current episode started more than 1 year ago. Current antihyperlipidemic treatment includes fibric acid derivatives. The current treatment provides significant improvement of lipids.     Review of Systems      Objective   Vital Signs:   /60 (BP Location: Right arm, Patient Position: Sitting)   Pulse 79   Temp 97.1 øF (36.2 øC) (Temporal)   Ht 162.6 cm (64.02\")   Wt 75.4 kg (166 lb 3.2 oz)   SpO2 98%   BMI 28.51 kg/mý     Physical Exam  Constitutional:       General: She is not in acute distress.     Appearance: Normal appearance. She is well-developed and well-groomed. She is not ill-appearing, toxic-appearing or diaphoretic.   HENT:      Head: Normocephalic.      Nose: Nose normal. No congestion or rhinorrhea.      Mouth/Throat:      Mouth: Mucous membranes are moist.      Pharynx: Oropharynx is clear. No oropharyngeal exudate or posterior oropharyngeal erythema.   Eyes:      General: Lids are normal.         Right eye: No discharge.         Left eye: No discharge.      Extraocular Movements: Extraocular movements intact.      Pupils: Pupils are equal, round, and reactive to light.   Neck:      Vascular: No carotid bruit.   Cardiovascular:      Rate and Rhythm: Normal rate and regular rhythm.      Pulses: Normal pulses.      Heart sounds: Normal heart sounds. No murmur heard.    No friction rub. No gallop.   Pulmonary:      Effort: Pulmonary effort is normal. No respiratory distress.      Breath sounds: Normal breath sounds. No stridor. No wheezing, rhonchi or rales.   Chest:      Chest wall: No tenderness.   Abdominal:      General: Bowel sounds are normal. There is no distension.      Palpations: Abdomen is soft. There is no mass.      Tenderness: There is no abdominal tenderness. There is no right CVA tenderness, left CVA " tenderness, guarding or rebound.      Hernia: No hernia is present.   Musculoskeletal:         General: No swelling or tenderness. Normal range of motion.      Cervical back: Normal range of motion and neck supple. No rigidity or tenderness.      Right lower leg: No edema.      Left lower leg: No edema.   Lymphadenopathy:      Cervical: No cervical adenopathy.   Skin:     General: Skin is warm.      Capillary Refill: Capillary refill takes less than 2 seconds.      Coloration: Skin is not jaundiced.      Findings: No bruising, erythema or rash.   Neurological:      General: No focal deficit present.      Mental Status: She is alert and oriented to person, place, and time.      Motor: Motor function is intact. No weakness.      Coordination: Coordination is intact.      Gait: Gait is intact. Gait normal.   Psychiatric:         Attention and Perception: Attention normal.         Mood and Affect: Mood normal.         Speech: Speech normal.         Behavior: Behavior normal.         Cognition and Memory: Cognition normal.         Judgment: Judgment normal.      Result Review :                 Assessment and Plan    Diagnoses and all orders for this visit:    1. Overweight (BMI 25.0-29.9) (Primary)  -     CBC Auto Differential; Future  -     Comprehensive Metabolic Panel; Future  -     Hemoglobin A1c; Future  -     Lipid Panel; Future  -     TSH; Future  -     T4, Free; Future    2. Dyslipidemia  -     Omega-3 Fatty Acids (fish oil) 1000 MG capsule capsule; Take 1 capsule by mouth 2 (Two) Times a Day With Meals.  Dispense: 60 capsule; Refill: 5    3. Class 1 obesity with body mass index (BMI) of 33.0 to 33.9 in adult, unspecified obesity type, unspecified whether serious comorbidity present  Comments:  contiue wegovy  Orders:  -     Omega-3 Fatty Acids (fish oil) 1000 MG capsule capsule; Take 1 capsule by mouth 2 (Two) Times a Day With Meals.  Dispense: 60 capsule; Refill: 5      Patient's Body mass index is 28.51 kg/mý.  indicating that she is overweight (BMI 25-29.9). Patient's (Body mass index is 28.51 kg/mý.) indicates that they are overweight with health conditions that include dyslipidemias . Weight is improving with treatment. BMI is is above average; BMI management plan is completed. We discussed low calorie, low carb based diet program, portion control, and increasing exercise. .    Follow Up   No follow-ups on file.  Patient was given instructions and counseling regarding her condition or for health maintenance advice. Please see specific information pulled into the AVS if appropriate.     This document has been electronically signed by PAVAN Castaneda  August 16, 2023 13:42 EDT

## 2023-08-23 ENCOUNTER — DISEASE STATE MANAGEMENT VISIT (OUTPATIENT)
Dept: PHARMACY | Facility: HOSPITAL | Age: 53
End: 2023-08-23
Payer: COMMERCIAL

## 2023-08-23 VITALS
DIASTOLIC BLOOD PRESSURE: 57 MMHG | BODY MASS INDEX: 28.34 KG/M2 | HEIGHT: 64 IN | HEART RATE: 66 BPM | WEIGHT: 166 LBS | SYSTOLIC BLOOD PRESSURE: 108 MMHG

## 2023-08-23 RX ORDER — SEMAGLUTIDE 2.4 MG/.75ML
2.4 INJECTION, SOLUTION SUBCUTANEOUS WEEKLY
Qty: 3 ML | Refills: 0 | Status: SHIPPED | OUTPATIENT
Start: 2023-08-23

## 2023-08-23 NOTE — PROGRESS NOTES
[facility]  Weight Management Program        Jaquan Carlson is a 53 y.o. 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  Jaquan Carlson has previously tried Saxenda, Adipex, Wegovy, Mounjaro and lifestyle changes for weight loss.  Current weight loss efforts includes Wegovy. The patient denies a personal history or family history of thyroid cancer and denies a personal history of pancreatitis. Denies hoarseness/swelling/lumps in throat or severe abdominal pain.     Patient is currently on Wegovy 2.4 mg weekly. Patient denies any lumps/swelling/hoarseness in neck/throat or abdominal pain. Patient reports tolerating medication well without any side effects. Patient denies any trouble giving injection or any missed doses. Patient reports that she has been consistently meeting her SMART goals but would like to leave them the same for now. She has been on vacation the end of last month and then celebrated her birthday this month. She feels like she hasn't eaten as well or had as much appetite suppression this month.      Relevant Past Medical History and Co-morbidities  Past Medical History:   Diagnosis Date    Diarrhea     Hypertensive disorder 10/7/2021    Hypothyroidism 12/6/2021    Umbilical hernia      Social History     Socioeconomic History    Marital status:    Tobacco Use    Smoking status: Never    Smokeless tobacco: Never   Vaping Use    Vaping Use: Never used   Substance and Sexual Activity    Alcohol use: No    Drug use: No    Sexual activity: Yes     Partners: Male     Birth control/protection: Surgical       Allergies  Penicillins    Current Medication List    Current Outpatient Medications:     levothyroxine (Synthroid) 50 MCG tablet, Take 1 tablet by mouth Daily., Disp: 90 tablet, Rfl: 0    lisinopril (PRINIVIL,ZESTRIL) 10 MG tablet, Take 1  "tablet by mouth Daily., Disp: 90 tablet, Rfl: 0    Loratadine 10 MG capsule, Take 1 capsule by mouth Daily., Disp: , Rfl:     Omega-3 Fatty Acids (fish oil) 1000 MG capsule capsule, Take 1 capsule by mouth 2 (Two) Times a Day With Meals., Disp: 60 capsule, Rfl: 5    ondansetron ODT (ZOFRAN-ODT) 4 MG disintegrating tablet, Dissolve 1 tablet on the tongue Every 12 (Twelve) Hours As Needed for Nausea or Vomiting., Disp: 45 tablet, Rfl: 1    Semaglutide-Weight Management (Wegovy) 2.4 MG/0.75ML solution auto-injector, Inject 2.4 mg under the skin into the appropriate area as directed 1 (One) Time Per Week., Disp: 3 mL, Rfl: 0    vitamin D (ERGOCALCIFEROL) 1.25 MG (93969 UT) capsule capsule, TAKE 1 CAPSULE BY MOUTH WEEKLY, Disp: 4 capsule, Rfl: 3    Drug Interactions  None     Relevant Laboratory Values  Lab Results   Component Value Date    CHOL 218 (H) 05/02/2023    CHLPL 199 08/15/2014    TRIG 119 05/02/2023    HDL 60 05/02/2023     (H) 05/02/2023     Vaccinations:   Patient recommended to keep up with routine vaccinations.     Goals of Therapy  Clinical Goals or Therapeutic Targets: 10% weight loss goal      Date 5/2/22 6/2/22 6/30/22 7/28/22 8/25/55 9/22/22   Weight (kg) 91.4 kg  201.08lb 190.6lb 185.4lb 180lb 181lb 175.6lb   BMI kg/ 34.6 32.71 31.8 30.9 31 30   Waist Circumference (in)  42 in 41.5in 40in 40in 39.75in 39.75     Date 10/20/22 11/17/22 12/15/22 1/12/23 2/9/23 3/9/23 4/6/23 5/4/23 6/1/23 6/29/23   Weight (kg) 170 lbs 170.6 lbs 165.8lbs 164.6 lbs 162.6 lb 162.4 lb 163 lb 162 lb 164 lbs 162 lb   BMI kg/ 29.18 29.28 28.46 28.25 27.91 27.88 27.98 27.81 28.14 27.79   Waist Circumference (in)  38 in 37 in 36.5 in 37.75\" 36\" 37.5\" 36\" 35.5\" 37\" 36\"     Date 7/26/23 8/23/23   Weight (kg) 165.4 166 lb   BMI kg/ 28.37 28.48   Waist Circumference (in)  37.5 in 36\"       Medication Assessment & Plan    Patient's current BMI is 28.48, which is considered overweight. Patient has lost 35 lbs overall. " Congratulated her on her success! She has gained 0.6 lb since previous visit but patient reports that she was on vacation and has celebrated her birthday.    Will re-order Wegovy 2.4 SC weekly. Encouraged healthy eating, exercise, and smaller portion sizes to help maintain/lose weight.     The following medications will need dose adjustments/closer monitoring once the GLP1 is started: levothyroxine     Discussed lifestyle modifications, including diet and exercise.  Patient education provided.     Nicole has ordered labs and patient plans to have these done prior to next appointment.    Worked with patient to create SMART Goal(s): Patient would like to continue these goals  1. Increase water intake to drink 80 oz of water everyday.   2. Continue to Achieve/Track 10,000 steps every day via her watch.  3. Begin weight bearing exercise at least 2 days/week.     Will follow-up in 4 weeks.     Alessia Maynard, PharmD  8/23/2023  08:29 EDT

## 2023-08-25 ENCOUNTER — TELEPHONE (OUTPATIENT)
Dept: FAMILY MEDICINE CLINIC | Facility: CLINIC | Age: 53
End: 2023-08-25
Payer: COMMERCIAL

## 2023-08-25 ENCOUNTER — LAB (OUTPATIENT)
Dept: LAB | Facility: HOSPITAL | Age: 53
End: 2023-08-25
Payer: COMMERCIAL

## 2023-08-25 DIAGNOSIS — E66.3 OVERWEIGHT (BMI 25.0-29.9): ICD-10-CM

## 2023-08-25 LAB
ALBUMIN SERPL-MCNC: 4.3 G/DL (ref 3.5–5.2)
ALBUMIN/GLOB SERPL: 1.5 G/DL
ALP SERPL-CCNC: 78 U/L (ref 39–117)
ALT SERPL W P-5'-P-CCNC: 25 U/L (ref 1–33)
ANION GAP SERPL CALCULATED.3IONS-SCNC: 10.3 MMOL/L (ref 5–15)
AST SERPL-CCNC: 18 U/L (ref 1–32)
BASOPHILS # BLD AUTO: 0.05 10*3/MM3 (ref 0–0.2)
BASOPHILS NFR BLD AUTO: 0.8 % (ref 0–1.5)
BILIRUB SERPL-MCNC: 0.4 MG/DL (ref 0–1.2)
BUN SERPL-MCNC: 13 MG/DL (ref 6–20)
BUN/CREAT SERPL: 12.4 (ref 7–25)
CALCIUM SPEC-SCNC: 9.6 MG/DL (ref 8.6–10.5)
CHLORIDE SERPL-SCNC: 104 MMOL/L (ref 98–107)
CHOLEST SERPL-MCNC: 223 MG/DL (ref 0–200)
CO2 SERPL-SCNC: 26.7 MMOL/L (ref 22–29)
CREAT SERPL-MCNC: 1.05 MG/DL (ref 0.57–1)
DEPRECATED RDW RBC AUTO: 42.2 FL (ref 37–54)
EGFRCR SERPLBLD CKD-EPI 2021: 63.3 ML/MIN/1.73
EOSINOPHIL # BLD AUTO: 0.1 10*3/MM3 (ref 0–0.4)
EOSINOPHIL NFR BLD AUTO: 1.7 % (ref 0.3–6.2)
ERYTHROCYTE [DISTWIDTH] IN BLOOD BY AUTOMATED COUNT: 12.5 % (ref 12.3–15.4)
GLOBULIN UR ELPH-MCNC: 2.9 GM/DL
GLUCOSE SERPL-MCNC: 102 MG/DL (ref 65–99)
HBA1C MFR BLD: 5.4 % (ref 4.8–5.6)
HCT VFR BLD AUTO: 44.2 % (ref 34–46.6)
HDLC SERPL-MCNC: 64 MG/DL (ref 40–60)
HGB BLD-MCNC: 14.7 G/DL (ref 12–15.9)
IMM GRANULOCYTES # BLD AUTO: 0.01 10*3/MM3 (ref 0–0.05)
IMM GRANULOCYTES NFR BLD AUTO: 0.2 % (ref 0–0.5)
LDLC SERPL CALC-MCNC: 141 MG/DL (ref 0–100)
LDLC/HDLC SERPL: 2.17 {RATIO}
LYMPHOCYTES # BLD AUTO: 3.44 10*3/MM3 (ref 0.7–3.1)
LYMPHOCYTES NFR BLD AUTO: 57.3 % (ref 19.6–45.3)
MCH RBC QN AUTO: 30.5 PG (ref 26.6–33)
MCHC RBC AUTO-ENTMCNC: 33.3 G/DL (ref 31.5–35.7)
MCV RBC AUTO: 91.7 FL (ref 79–97)
MONOCYTES # BLD AUTO: 0.37 10*3/MM3 (ref 0.1–0.9)
MONOCYTES NFR BLD AUTO: 6.2 % (ref 5–12)
NEUTROPHILS NFR BLD AUTO: 2.03 10*3/MM3 (ref 1.7–7)
NEUTROPHILS NFR BLD AUTO: 33.8 % (ref 42.7–76)
NRBC BLD AUTO-RTO: 0 /100 WBC (ref 0–0.2)
PLAT MORPH BLD: NORMAL
PLATELET # BLD AUTO: 334 10*3/MM3 (ref 140–450)
PMV BLD AUTO: 9.8 FL (ref 6–12)
POTASSIUM SERPL-SCNC: 4.8 MMOL/L (ref 3.5–5.2)
PROT SERPL-MCNC: 7.2 G/DL (ref 6–8.5)
RBC # BLD AUTO: 4.82 10*6/MM3 (ref 3.77–5.28)
RBC MORPH BLD: NORMAL
SODIUM SERPL-SCNC: 141 MMOL/L (ref 136–145)
T4 FREE SERPL-MCNC: 1.29 NG/DL (ref 0.93–1.7)
TRIGL SERPL-MCNC: 102 MG/DL (ref 0–150)
TSH SERPL DL<=0.05 MIU/L-ACNC: 2.14 UIU/ML (ref 0.27–4.2)
VLDLC SERPL-MCNC: 18 MG/DL (ref 5–40)
WBC NRBC COR # BLD: 6 10*3/MM3 (ref 3.4–10.8)

## 2023-08-25 PROCEDURE — 85007 BL SMEAR W/DIFF WBC COUNT: CPT

## 2023-08-25 PROCEDURE — 83036 HEMOGLOBIN GLYCOSYLATED A1C: CPT

## 2023-08-25 PROCEDURE — 80050 GENERAL HEALTH PANEL: CPT

## 2023-08-25 PROCEDURE — 80061 LIPID PANEL: CPT

## 2023-08-25 PROCEDURE — 84439 ASSAY OF FREE THYROXINE: CPT

## 2023-08-25 PROCEDURE — 36415 COLL VENOUS BLD VENIPUNCTURE: CPT

## 2023-08-25 NOTE — TELEPHONE ENCOUNTER
----- Message from PAVAN Castaneda sent at 8/25/2023 11:35 AM EDT -----  Please let patient know results are stable. Kidney function has improved. Thank you.       Left a message to return call.

## 2023-08-25 NOTE — TELEPHONE ENCOUNTER
----- Message from PAVAN Castaneda sent at 8/25/2023 11:35 AM EDT -----  Please let patient know results are stable. Kidney function has improved. Thank you.

## 2023-08-28 NOTE — TELEPHONE ENCOUNTER
----- Message from PAVAN Castaneda sent at 8/25/2023 11:35 AM EDT -----  Please let patient know results are stable. Kidney function has improved. Thank you.       Left a message to return call.      Hub may read.    Left a second message to return call.

## 2023-08-28 NOTE — TELEPHONE ENCOUNTER
----- Message from PAVAN Castaneda sent at 8/25/2023 11:35 AM EDT -----  Please let patient know results are stable. Kidney function has improved. Thank you.       Left a message to return call.    Left a second message to return call.

## 2023-08-29 NOTE — TELEPHONE ENCOUNTER
Please let patient know results are stable. Kidney function has improved. Thank you.         Left a message to return call.        Hub may read.    HUB RELAYED MESSAGE

## 2023-08-29 NOTE — TELEPHONE ENCOUNTER
----- Message from PAVAN Castaneda sent at 8/25/2023 11:35 AM EDT -----  Please let patient know results are stable. Kidney function has improved. Thank you.       Left a message to return call.      Hub may read.    Left a second message to return call.      Left a third message to return call.

## 2023-08-31 ENCOUNTER — TRANSCRIBE ORDERS (OUTPATIENT)
Dept: FAMILY MEDICINE CLINIC | Facility: CLINIC | Age: 53
End: 2023-08-31
Payer: COMMERCIAL

## 2023-08-31 DIAGNOSIS — Z12.31 BREAST CANCER SCREENING BY MAMMOGRAM: Primary | ICD-10-CM

## 2023-09-04 DIAGNOSIS — Z76.0 MEDICATION REFILL: ICD-10-CM

## 2023-09-04 DIAGNOSIS — I10 ESSENTIAL (PRIMARY) HYPERTENSION: ICD-10-CM

## 2023-09-05 RX ORDER — LISINOPRIL 10 MG/1
10 TABLET ORAL DAILY
Qty: 90 TABLET | Refills: 0 | Status: SHIPPED | OUTPATIENT
Start: 2023-09-05

## 2023-09-21 ENCOUNTER — DISEASE STATE MANAGEMENT VISIT (OUTPATIENT)
Dept: PHARMACY | Facility: HOSPITAL | Age: 53
End: 2023-09-21
Payer: COMMERCIAL

## 2023-09-21 VITALS
BODY MASS INDEX: 28.38 KG/M2 | DIASTOLIC BLOOD PRESSURE: 71 MMHG | SYSTOLIC BLOOD PRESSURE: 141 MMHG | HEIGHT: 64 IN | WEIGHT: 166.2 LBS | HEART RATE: 88 BPM

## 2023-09-21 RX ORDER — SEMAGLUTIDE 2.4 MG/.75ML
2.4 INJECTION, SOLUTION SUBCUTANEOUS WEEKLY
Qty: 3 ML | Refills: 0 | Status: SHIPPED | OUTPATIENT
Start: 2023-09-21

## 2023-09-21 NOTE — PROGRESS NOTES
[facility]  Weight Management Program        Jaquan Carlson is a 53 y.o. 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  Jaquan Carlson has previously tried Saxenda, Adipex, Wegovy, Mounjaro and lifestyle changes for weight loss.  Current weight loss efforts includes Wegovy. The patient denies a personal history or family history of thyroid cancer and denies a personal history of pancreatitis. Denies hoarseness/swelling/lumps in throat or severe abdominal pain.     Patient is currently on Wegovy 2.4 mg weekly. Patient denies any lumps/swelling/hoarseness in neck/throat or abdominal pain. Patient reports tolerating medication well without any side effects. Patient denies any trouble giving injection or any missed doses. Patient reports that she has been consistently meeting her SMART goals but would like to leave them the same for now.  She feels like she hasn't had as much appetite suppression this month and feels this is more consistent the longer she is on the max dose of 2.4 mg.       Relevant Past Medical History and Co-morbidities  Past Medical History:   Diagnosis Date    Diarrhea     Hypertensive disorder 10/7/2021    Hypothyroidism 12/6/2021    Umbilical hernia      Social History     Socioeconomic History    Marital status:    Tobacco Use    Smoking status: Never    Smokeless tobacco: Never   Vaping Use    Vaping Use: Never used   Substance and Sexual Activity    Alcohol use: No    Drug use: No    Sexual activity: Yes     Partners: Male     Birth control/protection: Surgical       Allergies  Penicillins    Current Medication List    Current Outpatient Medications:     levothyroxine (Synthroid) 50 MCG tablet, Take 1 tablet by mouth Daily., Disp: 90 tablet, Rfl: 0    lisinopril (PRINIVIL,ZESTRIL) 10 MG tablet, Take 1 tablet by mouth Daily., Disp: 90  "tablet, Rfl: 0    Loratadine 10 MG capsule, Take 1 capsule by mouth Daily., Disp: , Rfl:     Omega-3 Fatty Acids (fish oil) 1000 MG capsule capsule, Take 1 capsule by mouth 2 (Two) Times a Day With Meals., Disp: 60 capsule, Rfl: 5    ondansetron ODT (ZOFRAN-ODT) 4 MG disintegrating tablet, Dissolve 1 tablet on the tongue Every 12 (Twelve) Hours As Needed for Nausea or Vomiting., Disp: 45 tablet, Rfl: 1    Semaglutide-Weight Management (Wegovy) 2.4 MG/0.75ML solution auto-injector, Inject 2.4 mg under the skin into the appropriate area as directed 1 (One) Time Per Week., Disp: 3 mL, Rfl: 0    Sodium Sulfate-Mag Sulfate-KCl (Sutab) 3896-738-133 MG tablet, Take 12 Tablets by mouth 2 times a day, Disp: 24 tablet, Rfl: 0    vitamin D (ERGOCALCIFEROL) 1.25 MG (11477 UT) capsule capsule, TAKE 1 CAPSULE BY MOUTH WEEKLY, Disp: 4 capsule, Rfl: 3    Drug Interactions  None     Relevant Laboratory Values  Lab Results   Component Value Date    CHOL 223 (H) 08/25/2023    CHLPL 199 08/15/2014    TRIG 102 08/25/2023    HDL 64 (H) 08/25/2023     (H) 08/25/2023     Vaccinations:   Patient recommended to keep up with routine vaccinations.     Goals of Therapy  Clinical Goals or Therapeutic Targets: 10% weight loss goal      Date 5/2/22 6/2/22 6/30/22 7/28/22 8/25/55 9/22/22   Weight (kg) 91.4 kg  201.08lb 190.6lb 185.4lb 180lb 181lb 175.6lb   BMI kg/ 34.6 32.71 31.8 30.9 31 30   Waist Circumference (in)  42 in 41.5in 40in 40in 39.75in 39.75     Date 10/20/22 11/17/22 12/15/22 1/12/23 2/9/23 3/9/23 4/6/23 5/4/23 6/1/23 6/29/23   Weight (kg) 170 lbs 170.6 lbs 165.8lbs 164.6 lbs 162.6 lb 162.4 lb 163 lb 162 lb 164 lbs 162 lb   BMI kg/ 29.18 29.28 28.46 28.25 27.91 27.88 27.98 27.81 28.14 27.79   Waist Circumference (in)  38 in 37 in 36.5 in 37.75\" 36\" 37.5\" 36\" 35.5\" 37\" 36\"     Date 7/26/23 8/23/23 9/21/23   Weight (kg) 165.4 166 lb 166.2 lb   BMI kg/ 28.37 28.48 28.51   Waist Circumference (in)  37.5 in 36\" 39\"       Medication " Assessment & Plan    Patient's current BMI is 28.51, which is considered overweight. Patient has lost 35 lbs overall. Congratulated her on her success! Patient has been maintaining weight/slight weight gain over last few visits.     Will re-order Wegovy 2.4 SC weekly. Encouraged healthy eating, exercise, and smaller portion sizes to help maintain/lose weight.     The following medications will need dose adjustments/closer monitoring once the GLP1 is started: levothyroxine     Patient has upcoming colonoscopy on November 10th. . Pt reports that Wegovy was discussed and she has been told to stop Wegovy 2 weeks prior to procedure date.     Discussed lifestyle modifications, including diet and exercise.  Patient education provided.     Patient reported Nicole Garcia office reported on her lab work that she was remaining stable. Will reach out to make sure Nicole noted elevated cholesterol panel still.     Worked with patient to create SMART Goal(s): Patient would like to continue these goals  1. Increase water intake to drink 80 oz of water everyday.   2. Continue to Achieve/Track 10,000 steps every day via her watch.  3. Begin weight bearing exercise at least 2 days/week.     Will follow-up in 4 weeks.     Makenna Snell. Caitlin, PharmD  9/21/2023  08:06 EDT

## 2023-10-11 ENCOUNTER — HOSPITAL ENCOUNTER (OUTPATIENT)
Dept: MAMMOGRAPHY | Facility: HOSPITAL | Age: 53
Discharge: HOME | End: 2023-10-11
Admitting: FAMILY MEDICINE
Payer: COMMERCIAL

## 2023-10-11 DIAGNOSIS — Z12.31 BREAST CANCER SCREENING BY MAMMOGRAM: ICD-10-CM

## 2023-10-11 PROCEDURE — 77067 SCR MAMMO BI INCL CAD: CPT

## 2023-10-11 PROCEDURE — 77063 BREAST TOMOSYNTHESIS BI: CPT

## 2023-10-17 ENCOUNTER — DISEASE STATE MANAGEMENT VISIT (OUTPATIENT)
Dept: PHARMACY | Facility: HOSPITAL | Age: 53
End: 2023-10-17
Payer: COMMERCIAL

## 2023-10-17 VITALS
BODY MASS INDEX: 28.48 KG/M2 | SYSTOLIC BLOOD PRESSURE: 120 MMHG | HEART RATE: 70 BPM | DIASTOLIC BLOOD PRESSURE: 44 MMHG | WEIGHT: 166.8 LBS | HEIGHT: 64 IN

## 2023-10-17 RX ORDER — SEMAGLUTIDE 2.4 MG/.75ML
2.4 INJECTION, SOLUTION SUBCUTANEOUS WEEKLY
Qty: 3 ML | Refills: 0 | Status: SHIPPED | OUTPATIENT
Start: 2023-10-17

## 2023-10-17 NOTE — PROGRESS NOTES
[facility]  Weight Management Program        Jaquan Carlson is a 53 y.o. 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  Jaquan Carlson has previously tried Saxenda, Adipex, Wegovy, Mounjaro and lifestyle changes for weight loss.  Current weight loss efforts includes Wegovy. The patient denies a personal history or family history of thyroid cancer and denies a personal history of pancreatitis. Denies hoarseness/swelling/lumps in throat or severe abdominal pain.     Patient is currently on Wegovy 2.4 mg weekly. Patient denies any lumps/swelling/hoarseness in neck/throat or abdominal pain. Patient reports tolerating medication well without any side effects. Patient denies any trouble giving injection or any missed doses. Patient reports that she has been consistently meeting her SMART goals but would like to leave them the same for now.  She feels like she hasn't had as much appetite suppression this month and feels this is more consistent the longer she is on the max dose of 2.4 mg.   Patient has upcoming colonoscopy on November 10th and already is aware that she has been instructed to stop Wegovy  for 2 weeks prior to this.       Relevant Past Medical History and Co-morbidities  Past Medical History:   Diagnosis Date    Diarrhea     Hypertensive disorder 10/7/2021    Hypothyroidism 12/6/2021    Umbilical hernia      Social History     Socioeconomic History    Marital status:    Tobacco Use    Smoking status: Never    Smokeless tobacco: Never   Vaping Use    Vaping Use: Never used   Substance and Sexual Activity    Alcohol use: No    Drug use: No    Sexual activity: Yes     Partners: Male     Birth control/protection: Surgical       Allergies  Penicillins    Current Medication List    Current Outpatient Medications:     levothyroxine (Synthroid) 50 MCG  "tablet, Take 1 tablet by mouth Daily., Disp: 90 tablet, Rfl: 0    lisinopril (PRINIVIL,ZESTRIL) 10 MG tablet, Take 1 tablet by mouth Daily., Disp: 90 tablet, Rfl: 0    Loratadine 10 MG capsule, Take 1 capsule by mouth Daily., Disp: , Rfl:     Omega-3 Fatty Acids (fish oil) 1000 MG capsule capsule, Take 1 capsule by mouth 2 (Two) Times a Day With Meals., Disp: 60 capsule, Rfl: 5    ondansetron ODT (ZOFRAN-ODT) 4 MG disintegrating tablet, Dissolve 1 tablet on the tongue Every 12 (Twelve) Hours As Needed for Nausea or Vomiting., Disp: 45 tablet, Rfl: 1    Semaglutide-Weight Management (Wegovy) 2.4 MG/0.75ML solution auto-injector, Inject 2.4 mg under the skin into the appropriate area as directed 1 (One) Time Per Week., Disp: 3 mL, Rfl: 0    Sodium Sulfate-Mag Sulfate-KCl (Sutab) 2992-134-114 MG tablet, Take 12 Tablets by mouth 2 times a day, Disp: 24 tablet, Rfl: 0    vitamin D (ERGOCALCIFEROL) 1.25 MG (12815 UT) capsule capsule, TAKE 1 CAPSULE BY MOUTH WEEKLY, Disp: 4 capsule, Rfl: 3    Drug Interactions  None     Relevant Laboratory Values  Lab Results   Component Value Date    CHOL 223 (H) 08/25/2023    CHLPL 199 08/15/2014    TRIG 102 08/25/2023    HDL 64 (H) 08/25/2023     (H) 08/25/2023     Vaccinations:   Patient recommended to keep up with routine vaccinations.     Goals of Therapy  Clinical Goals or Therapeutic Targets: 10% weight loss goal      Date 5/2/22 6/2/22 6/30/22 7/28/22 8/25/55 9/22/22   Weight (kg) 91.4 kg  201.08lb 190.6lb 185.4lb 180lb 181lb 175.6lb   BMI kg/ 34.6 32.71 31.8 30.9 31 30   Waist Circumference (in)  42 in 41.5in 40in 40in 39.75in 39.75     Date 10/20/22 11/17/22 12/15/22 1/12/23 2/9/23 3/9/23 4/6/23 5/4/23 6/1/23 6/29/23   Weight (kg) 170 lbs 170.6 lbs 165.8lbs 164.6 lbs 162.6 lb 162.4 lb 163 lb 162 lb 164 lbs 162 lb   BMI kg/ 29.18 29.28 28.46 28.25 27.91 27.88 27.98 27.81 28.14 27.79   Waist Circumference (in)  38 in 37 in 36.5 in 37.75\" 36\" 37.5\" 36\" 35.5\" 37\" 36\"     Date " "7/26/23 8/23/23 9/21/23 10/17/23   Weight (kg) 165.4 166 lb 166.2 lb 166.8 lbs   BMI kg/ 28.37 28.48 28.51 28.61   Waist Circumference (in)  37.5 in 36\" 39\" 38.5\"       Medication Assessment & Plan    Patient's current BMI is 28.61, which is considered overweight. Patient has lost 35 lbs overall. Congratulated her on her success! Patient has been maintaining weight/slight weight gain over last few visits.     Will re-order Wegovy 2.4 SC weekly. Encouraged healthy eating, exercise, and smaller portion sizes to help maintain/lose weight.     The following medications will need dose adjustments/closer monitoring once the GLP1 is started: levothyroxine     Patient has upcoming colonoscopy on November 10th . Pt reports that Wegovy was discussed and she has been told to stop Wegovy 2 weeks prior to procedure date. Patient will re-start afterwards and is aware of increased risk of intolerance. Patient will return 2 weeks after colonoscopy since she will not be without refills until that time.     Discussed lifestyle modifications, including diet and exercise.  Patient education provided.     Patient reported Nicole Garcia office reported on her lab work that she was remaining stable. Will reach out to make sure Nicole noted elevated cholesterol panel still.     Worked with patient to create SMART Goal(s): Patient would like to continue these goals  1. Increase water intake to drink 80 oz of water everyday.   2. Continue to Achieve/Track 10,000 steps every day via her watch.  3. Begin weight bearing exercise at least 2 days/week.     Will follow-up in 4 weeks.     Makenna Snell. Caitlin, PharmD  10/17/2023  09:27 EDT  "

## 2023-10-23 DIAGNOSIS — Z76.0 MEDICATION REFILL: ICD-10-CM

## 2023-10-23 DIAGNOSIS — E03.9 HYPOTHYROIDISM, UNSPECIFIED TYPE: ICD-10-CM

## 2023-10-23 DIAGNOSIS — E55.9 VITAMIN D DEFICIENCY: ICD-10-CM

## 2023-10-23 RX ORDER — ERGOCALCIFEROL 1.25 MG/1
CAPSULE ORAL
Qty: 4 CAPSULE | Refills: 3 | Status: SHIPPED | OUTPATIENT
Start: 2023-10-23

## 2023-10-23 RX ORDER — LEVOTHYROXINE SODIUM 0.05 MG/1
50 TABLET ORAL DAILY
Qty: 90 TABLET | Refills: 0 | Status: SHIPPED | OUTPATIENT
Start: 2023-10-23

## 2023-10-27 ENCOUNTER — TELEPHONE (OUTPATIENT)
Dept: FAMILY MEDICINE CLINIC | Facility: CLINIC | Age: 53
End: 2023-10-27
Payer: COMMERCIAL

## 2023-12-04 ENCOUNTER — DISEASE STATE MANAGEMENT VISIT (OUTPATIENT)
Dept: PHARMACY | Facility: HOSPITAL | Age: 53
End: 2023-12-04
Payer: COMMERCIAL

## 2023-12-04 ENCOUNTER — OFFICE VISIT (OUTPATIENT)
Dept: FAMILY MEDICINE CLINIC | Facility: CLINIC | Age: 53
End: 2023-12-04
Payer: COMMERCIAL

## 2023-12-04 VITALS
BODY MASS INDEX: 29.91 KG/M2 | SYSTOLIC BLOOD PRESSURE: 134 MMHG | WEIGHT: 175.2 LBS | DIASTOLIC BLOOD PRESSURE: 77 MMHG | HEIGHT: 64 IN | HEART RATE: 97 BPM

## 2023-12-04 VITALS
WEIGHT: 176 LBS | OXYGEN SATURATION: 99 % | DIASTOLIC BLOOD PRESSURE: 74 MMHG | SYSTOLIC BLOOD PRESSURE: 118 MMHG | HEART RATE: 88 BPM | BODY MASS INDEX: 30.05 KG/M2 | TEMPERATURE: 97.1 F | HEIGHT: 64 IN

## 2023-12-04 DIAGNOSIS — R63.4 WEIGHT LOSS: Primary | ICD-10-CM

## 2023-12-04 PROCEDURE — 99213 OFFICE O/P EST LOW 20 MIN: CPT | Performed by: FAMILY MEDICINE

## 2023-12-04 RX ORDER — SEMAGLUTIDE 1.7 MG/.75ML
1.7 INJECTION, SOLUTION SUBCUTANEOUS WEEKLY
Qty: 3 ML | Refills: 0 | Status: SHIPPED | OUTPATIENT
Start: 2023-12-04

## 2023-12-04 NOTE — PROGRESS NOTES
[facility]  Weight Management Program        Jaquan Carlson is a 53 y.o. 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  Jaquan Carlson has previously tried Saxenda, Adipex, Wegovy, Mounjaro and lifestyle changes for weight loss.  Current weight loss efforts includes Wegovy. The patient denies a personal history or family history of thyroid cancer and denies a personal history of pancreatitis. Denies hoarseness/swelling/lumps in throat or severe abdominal pain.     Patient is currently on Wegovy 2.4 mg weekly. Patient denies any lumps/swelling/hoarseness in neck/throat or abdominal pain. Patient reports tolerating medication well without any side effects.  Patient had  colonoscopy on November 10th and had held Wegovy 2 weeks prior to this and also felt that most of her dose did not get injected the week prior so patient has been more like 3 weeks without full dose of medication. Patient reports that she has definitely been eating more without Wegovy and that she has had Thanksgiving and a work trip to [place] during this time which has made diet even harder to control. Patient reported that from information she knows currently that colonoscopy went well but they had mentioned some diverticulitis and she reports she was told that they felt it was from Wegovy.  Patient is ready to get back consistently on medication today.   Patient reported that she has stopped her BP medication because her BP was dropping too low. BP stable today and patient reports that this is what it runs at home if not lower.   Patient reports that she has been consistently meeting her SMART goals but would like to leave them the same for now.      Relevant Past Medical History and Co-morbidities  Past Medical History:   Diagnosis Date    Diarrhea     Hypertensive disorder 10/7/2021     Hypothyroidism 12/6/2021    Umbilical hernia      Social History     Socioeconomic History    Marital status:    Tobacco Use    Smoking status: Never    Smokeless tobacco: Never   Vaping Use    Vaping Use: Never used   Substance and Sexual Activity    Alcohol use: No    Drug use: No    Sexual activity: Yes     Partners: Male     Birth control/protection: Surgical       Allergies  Penicillins    Current Medication List    Current Outpatient Medications:     levothyroxine (Synthroid) 50 MCG tablet, Take 1 tablet by mouth Daily., Disp: 90 tablet, Rfl: 0    lisinopril (PRINIVIL,ZESTRIL) 10 MG tablet, Take 1 tablet by mouth Daily., Disp: 90 tablet, Rfl: 0    Loratadine 10 MG capsule, Take 1 capsule by mouth Daily., Disp: , Rfl:     Omega-3 Fatty Acids (fish oil) 1000 MG capsule capsule, Take 1 capsule by mouth 2 (Two) Times a Day With Meals., Disp: 60 capsule, Rfl: 5    ondansetron ODT (ZOFRAN-ODT) 4 MG disintegrating tablet, Dissolve 1 tablet on the tongue Every 12 (Twelve) Hours As Needed for Nausea or Vomiting., Disp: 45 tablet, Rfl: 1    Semaglutide-Weight Management (Wegovy) 2.4 MG/0.75ML solution auto-injector, Inject 2.4 mg (0.75 mL) under the skin into the appropriate area as directed by provider 1 (one) time per week., Disp: 3 mL, Rfl: 0    Sodium Sulfate-Mag Sulfate-KCl (Sutab) 7871-458-346 MG tablet, Take 12 Tablets by mouth 2 times a day, Disp: 24 tablet, Rfl: 0    vitamin D (ERGOCALCIFEROL) 1.25 MG (81272 UT) capsule capsule, TAKE 1 CAPSULE BY MOUTH WEEKLY, Disp: 4 capsule, Rfl: 3    Drug Interactions  None     Relevant Laboratory Values  Lab Results   Component Value Date    CHOL 223 (H) 08/25/2023    CHLPL 199 08/15/2014    TRIG 102 08/25/2023    HDL 64 (H) 08/25/2023     (H) 08/25/2023     Vaccinations:   Patient recommended to keep up with routine vaccinations.     Goals of Therapy  Clinical Goals or Therapeutic Targets: 10% weight loss goal      Date 5/2/22 6/2/22 6/30/22 7/28/22 8/25/55  "9/22/22   Weight (kg) 91.4 kg  201.08lb 190.6lb 185.4lb 180lb 181lb 175.6lb   BMI kg/ 34.6 32.71 31.8 30.9 31 30   Waist Circumference (in)  42 in 41.5in 40in 40in 39.75in 39.75     Date 10/20/22 11/17/22 12/15/22 1/12/23 2/9/23 3/9/23 4/6/23 5/4/23 6/1/23 6/29/23   Weight (kg) 170 lbs 170.6 lbs 165.8lbs 164.6 lbs 162.6 lb 162.4 lb 163 lb 162 lb 164 lbs 162 lb   BMI kg/ 29.18 29.28 28.46 28.25 27.91 27.88 27.98 27.81 28.14 27.79   Waist Circumference (in)  38 in 37 in 36.5 in 37.75\" 36\" 37.5\" 36\" 35.5\" 37\" 36\"     Date 7/26/23 8/23/23 9/21/23 10/17/23 12/4/23   Weight (kg) 165.4 166 lb 166.2 lb 166.8 lbs 175.2 lb   BMI kg/ 28.37 28.48 28.51 28.61 30.05   Waist Circumference (in)  37.5 in 36\" 39\" 38.5\" 38\"       Medication Assessment & Plan    Patient's current BMI is 28.61, which is considered overweight. Patient has lost 35 lbs overall. Congratulated her on her success! Patient has been maintaining weight/slight weight gain over last few visits until this visit in which she has gained but has been off of Wegovy a few weeks due to colonoscopy.     Will order Wegovy 1.7 SC weekly. Decreased dose back to 1.7 mg since patient has been holding a few weeks for colonoscopy.     The following medications will need dose adjustments/closer monitoring once the GLP1 is started: levothyroxine     Patient reported today that she has stopped her BP medication. She has an appointment today with referring provider. I have asked her to discuss the following with her at that appointment and have also routed the chart note to Nicole so she is aware to discuss with patient: Stopping BP medication, evaluate cholesterol panel, concern of Wegovy causing diverticulitis and if she is still good with patient proceeding with medication.     Discussed lifestyle modifications, including diet and exercise.  Patient education provided.       Worked with patient to create SMART Goal(s): Patient would like to continue these goals  1. Increase " water intake to drink 80 oz of water everyday.   2. Continue to Achieve/Track 10,000 steps every day via her watch.  3. Begin weight bearing exercise at least 2 days/week.     Will follow-up in 4 weeks.     Makenna Snell. Caitlin, PharmD  12/4/2023  07:14 EST

## 2023-12-06 ENCOUNTER — E-VISIT (OUTPATIENT)
Dept: FAMILY MEDICINE CLINIC | Facility: TELEHEALTH | Age: 53
End: 2023-12-06

## 2023-12-06 NOTE — EXTERNAL PATIENT INSTRUCTIONS
Diagnosis   Allergic rhinitis (allergies)   My name is PAVAN Cruz, and I'm a healthcare provider at [facility]. I reviewed your interview and I see that you have allergic rhinitis, also known as seasonal allergies or hay fever. Allergic rhinitis is not an infection. It isn't caused by a virus or bacteria. Although allergy symptoms can be unpleasant, your symptoms aren't part of a more serious condition.   In your interview, you mentioned that you don't think your symptoms are allergy-related. However, based on your responses, I believe this is the right diagnosis for you. If you still aren't feeling better after following this treatment plan, contact us to set up an appointment.   Based on what you told me in your interview, I haven't prescribed any antibiotics. Antibiotics won't help with allergies. Your symptoms suggest you have allergies, not an infection:    Coughing, sneezing, or leaning forward has no effect on your sinus pain. In contrast, sinus pain caused by viruses or bacteria may get worse with these actions.    Thin nasal drainage, like yours, is more common with allergies. Drainage with viral and bacterial infections tends to be thick.    You haven't had symptoms long enough to suggest a bacterial infection.    You don't have a fever. Viral and bacterial infections, but not allergies, can lead to a fever.    You don't have a sore throat. Bacterial infections like strep throat cause a severe sore throat.   Medications   Your pharmacy   [facility] [address] [place] [address] [phone]     Prescription   Methylprednisolone (4mg): On day 1, take 2 tablets by mouth before breakfast, 1 tablet by mouth after lunch, 1 tablet by mouth after dinner, and 2 tablets by mouth at bedtime. On day 2, take 1 tablet by mouth before breakfast, 1 tablet by mouth after lunch, 1 tablet by mouth after dinner, and 2 tablets by mouth at bedtime. On day 3, take 1 tablet by mouth before  breakfast, 1 tablet by mouth after lunch, 1 tablet by mouth after dinner, and 1 tablet by mouth at bedtime. On day 4, take 1 tablet by mouth before breakfast, 1 tablet by mouth after lunch, and 1 tablet by mouth at bedtime. On day 5, take 1 tablet by mouth before breakfast and 1 tablet by mouth at bedtime. On day 6, take 1 tablet by mouth before breakfast.   Non-prescription   Fluticasone (50mcg): Spray 2 times in each nostril daily for nasal symptoms due to allergies or sinusitis. Fluticasone needs to be used every day to be effective. It may take up to a week for the full effects of the medication to be seen. Brands to look for include Flonase.   Loratadine (10mg): Take 1 tablet by mouth daily as needed for allergies or cough. Brands to look for include Claritin.   About your diagnosis   Allergic rhinitis, also called allergies or hay fever, is very common. Symptoms can seem similar to a cold, but they're caused by an allergen, not a virus. These are the key things to know about allergies:    The best way to treat allergies is to avoid the cause of your symptoms. Medications can also help your symptoms.    Symptoms range in severity. They may be only mildly annoying or they may seriously affect day-to-day life.    Symptoms can be seasonal, triggered by tree pollen, grasses, and weeds. Mold, dust, or pet dander can cause allergy symptoms in any season.   In addition to symptoms affecting the nose and eyes, allergies can cause fatigue and irritability.   What to expect   Despite the unpleasant symptoms, you should feel better soon. Follow the treatment plan provided here.   When to seek care   Call us at [phone]   with any sudden or unexpected symptoms.    Severe allergy symptoms.    Your normal allergy medications stop working or cause uncomfortable side effects.    Sinus pain that lasts for more than 10 days, without improvement.    Severe chest pain.   Other treatment   If possible, avoid the allergens  that trigger your allergy symptoms. If you normally use medications, inhalers, or an asthma action plan, continue using them as prescribed.   Prevention   Allergy medications work best if you take them before your symptoms develop.   Avoid smoke and air pollution. Smoke can make infections worse.    Flu vaccine information   Who should get a flu vaccine?   Everyone 6 months of age and older should get a yearly flu vaccine.   When should I get vaccinated?   You should get a flu vaccine by the end of October. Once you're vaccinated, it takes about two weeks for antibodies to develop and protect you against the flu. That's why it's important to get vaccinated as soon as possible.   After October, is it too late to get vaccinated?   No. You should still get vaccinated. As long as the flu viruses are still in your community, flu vaccines will remain available, even into January of next year or later.   Why do I need a flu vaccine EVERY year?   Flu viruses are constantly changing, so flu vaccines are usually updated from one season to the next. Your protection from the flu vaccine also lessens over time.   Is the flu vaccine safe?   Yes. Over the last 50 years, hundreds of millions of Americans have safely received the flu vaccines.   What are the side effects of flu vaccines?   You CANNOT get the flu from a flu vaccine. Common side effects of the flu shot include soreness, redness and/or swelling where the shot was given, low grade fever, and aches. Common side effects of the nasal spray flu vaccine for adults include runny nose, headaches, sore throat, and cough. For children, side effects include wheezing, vomiting, muscle aches, and fever.   Does the flu vaccine increase your risk of getting COVID-19?   No. There is no evidence that getting a flu vaccine increases your risk of getting COVID-19.   Is it safe to get the flu vaccine along with a COVID-19 vaccine?   Yes. It's safe to get the flu vaccine with a COVID-19  vaccine or booster.   Contact your healthcare provider TODAY for details on when and where to get your flu vaccine.    Coronavirus (COVID-19) information   Common symptoms of COVID-19 include fever, cough, shortness of breath, fatigue, muscle or body aches, headaches, new loss of sense of taste or smell, sore throat, stuffy or runny nose, nausea or vomiting, and diarrhea. Most people who get COVID-19 have mild symptoms and can rest at home until they get better. Elderly people and those with chronic medical problems may be at risk for more serious complications.   FAQs about the COVID-19 vaccine   There are four authorized COVID-19 vaccines: Timo & Timo's Mary Vaccine (J&J/Mary), Moderna, Novavax, and Pfizer-BioNTech (Pfizer). The J&J/Mary and Novavax vaccines are approved for use in people aged 18 and older. The Moderna and Pfizer vaccines are approved for those aged 6 months and older. All four are available at no cost. Even if you don't have health insurance, you can still get the COVID-19 vaccine for free.   Which vaccine is the best? Which vaccine should I get?   All four vaccines are highly effective. Even if you get COVID-19 after being vaccinated, all of the vaccines help prevent severe disease, hospitalization, and complications.   Most people should get whichever vaccine is first available to them. However, women younger than 50 years old should consider the rare risk of blood clots with low platelets after vaccination with the J&J/Mary vaccine. This risk hasn't been seen with the other three vaccines.   Are the vaccines safe?   Yes. Hundreds of millions of people in the [place] have already safely received COVID-19 vaccines under the most intense safety monitoring in the history of the [place].   Do I need the vaccine if I've already had COVID?   Yes. Vaccination helps protect you even if you've already had COVID.   If you had COVID-19 and had symptoms, wait to get vaccinated until you've  recovered and completed your isolation period.   If you tested positive for COVID-19 but did not have symptoms, you can get vaccinated after 5 full days have passed since you had a positive test, as long as you don't develop symptoms.   How many doses of the vaccine do I need?   Visit www.cdc.gov/coronavirus/2019-ncov/vaccines/stay-up-to-date.html   to find out how to stay up to date with your COVID-19 vaccines.   I'm immunocompromised. How many doses of the vaccine do I need?   For information on how immunocompromised people can stay up to date with their COVID-19 vaccines, visit www.cdc.gov/coronavirus/2019-ncov/vaccines/recommendations/immuno.html  .   What are the common side effects of the vaccine?   A sore arm, tiredness, headache, and muscle pain may occur within two days of getting the vaccine and last a day or two. For the Moderna or Pfizer vaccines, side effects are more common after the second dose. People over the age of 55 are less likely to have side effects than younger people.   After I'm up to date on vaccines, can I still get or spread COVID?   Yes, you can still get COVID, but your disease should be milder. And your risk of serious illness, hospitalization, and complications will be much lower, especially if you're up to date. Unfortunately, you can still spread COVID if you've been vaccinated. That's why it's important to follow isolation guidelines if you get sick or test positive.   After I'm up to date on vaccines, can I go back to normal?   You should still wear a mask indoors in public if:    It's required by laws, rules, regulations, or local guidance.    You have a weakened immune system.    Your age puts you at increased risk of severe disease.    You have a medical condition that puts you at increased risk of severe disease.    Someone in your household has a weakened immune system, is at increased risk for severe disease, or is unvaccinated.    You're in an area of high transmission.    Where can I get a COVID-19 vaccine?   Visit [facility]'s website for more information. To find a COVID-19 vaccination site near you, visit www.vaccines.gov/  , call [phone]  , or text your zip code to 354740 ([employer]). Message and data rates may apply.   What about travel?   Travel increases your risk of exposure to COVID-19. For more information, see www.cdc.gov/coronavirus/2019-ncov/travelers/index.html  .   I've had close contact with someone who has COVID. Do I need to quarantine, and if so, for how long?   For the most current answer, including a calculator to determine whether you need to stay home and for how long, visit www.cdc.gov/coronavirus/2019-ncov/your-health/quarantine-isolation.html  .   I've tested positive for COVID. How long do I need to isolate?   For the latest recommendations, including a calculator to determine how long you need to stay home, visit www.cdc.gov/coronavirus/2019-ncov/your-health/quarantine-isolation.html  .   What if I develop symptoms that might be from COVID?   For the latest recommendations on what to do if you're sick, including when to seek emergency care, visit www.cdc.gov/coronavirus/2019-ncov/if-you-are-sick/steps-when-sick.html  .   Your provider   Your diagnosis was provided by PAVAN Cruz, a member of your trusted care team at [facility].   If you have any questions, call us at [phone]  .

## 2023-12-06 NOTE — E-VISIT TREATED
Chief Complaint: Cold, flu, COVID, sinus, hay fever, or seasonal allergies   Patient introduction   Patient is 53-year-old female with cough, congestion, voice hoarseness, headache, and fatigue that started 6 to 9 days ago. Regarding date of symptom onset, patient writes: 11/30/23.   COVID-19 testing history, vaccination status, and exposure:    Has not been tested for COVID-19 since symptom onset.    Patient was prompted to take a self-test during the interview, but does not have a COVID-19 test kit.    Has not received an updated [id number] COVID-19 vaccination (Pfizer-BioNTech or Moderna vaccine after September 12, 2023; or Novavax vaccine after October 3, 2023).    No known exposure to a person with a confirmed or suspected case of COVID-19.    No high-risk (household) exposure to COVID-19 within the last 14 days.    No travel outside local community within the last 14 days.   Risk factors for severe disease from COVID-19 infection    Age 50 or older.    Higher risk for severe disease from COVID-19 because of BMI >= 25.    Mostly sedentary lifestyle.    Hypertension.   Warning. The following may warrant further investigation:    Hypertension.   General presentation   No improvement of symptoms. Symptoms came on suddenly.   Fever:    No fever.   Sinus and nasal symptoms:    Clear nasal drainage.    Nasal drainage is thin.    Postnasal drip.    Sinus pain or pressure on or around the nose.    Patient first noticed sinus pain 10 to 14 days ago.    Sinus pain is not worse with Valsalva.    No nasal discharge.    No itchy nose or sneezing.    No history of unhealed nasal septal ulcer/nasal wound.    No history of antibiotic treatment for sinus infection in the last year.    No history of deviated septum or nasal polyps.   Throat symptoms:    Voice is severely hoarse. Patient does not believe hoarseness is due to voice strain.    No sore throat.   Head and body aches:    Headache described as mild (1 to 3 on a scale  "of 1 to 10).    Fatigue.    No sweats.    No chills.    No myalgia.   Cough:    Cough is worse during the day.    Cough is mildly productive of sputum.    Describes color of sputum as clear.   Wheezing and shortness of breath:    No COPD diagnosis.    No asthma diagnosis.    No wheezing.    No shortness of breath.    No previous albuterol inhaler use for URIs, bronchitis, or pneumonia.    No previous steroid inhaler use for URIs, bronchitis, or pneumonia.   Chest pain:    No chest pain.   Ear symptoms:    None.   Dizziness:    No dizziness.   Allergies:    No history of allergies.   Flu exposure:    No recent known exposure to a person with a confirmed flu diagnosis.    Received a flu vaccine in the last 1 to 3 months.   Not taking any over-the-counter medications for current symptoms.   Review of red flags/alarm symptoms:    No changes in alertness or awareness.    No symptoms suggesting intracranial hemorrhage.    No decreased urination.   Pregnancy/menstrual status/breastfeeding:    Patient is postmenopausal.   Self-exam:    Height: 5' 4\"    Weight: 170 lbs    Neck lymph nodes feel normal.   Recent antibiotic use:    Has not taken antibiotics for similar symptoms within the past month.   Current medications   Currently taking fish oil 1000 MG capsule capsule, levothyroxine 50 MCG tablet, vitamin D 1.25 MG (52464 UT) capsule capsule, Wegovy 1.7 MG/0.75ML solution auto-injector, and Loratadine 10 MG capsule.   Medication allergies   None.   Medication contraindication review   Patient has history of hypertension. Therefore, the following medication(s) will not be prescribed:    Metoclopramide.    Acetaminophen-diphenhydramine-phenylephrine.    Pseudoephedrine.    Aspirin-chlorpheniramine-phenylephrine.   No history of metoclopramide-associated dystonic reaction and tardive dyskinesia.   No known history of amoxicillin-clavulanate-associated cholestatic jaundice or hepatic impairment.   No known history of " azithromycin-associated cholestatic jaundice or hepatic impairment.   Past medical history   Immune conditions:   No immunocompromising conditions.   No history of cancer.   Social history   Never smoked tobacco.   Patient-submitted comments   Patient was asked if they had anything to add about their symptoms. Patient writes: I feel like I have drainage stuck in my throat. I have no voice at all..   Patient did not request an excuse note.   Assessment   Allergic rhinitis.   This is the likely diagnosis based on patient's interview responses, including:    Symptom profile   Plan   Medications:    methylprednisolone 4 mg tablets in a dose pack RX 4mg as directed PO qid 6d for allergies. On day 1, take 2 tablets by mouth before breakfast, 1 tablet by mouth after lunch, 1 tablet by mouth after dinner, and 2 tablets by mouth at bedtime. On day 2, take 1 tablet by mouth before breakfast, 1 tablet by mouth after lunch, 1 tablet by mouth after dinner, and 2 tablets by mouth at bedtime. On day 3, take 1 tablet by mouth before breakfast, 1 tablet by mouth after lunch, 1 tablet by mouth after dinner, and 1 tablet by mouth at bedtime. On day 4, take 1 tablet by mouth before breakfast, 1 tablet by mouth after lunch, and 1 tablet by mouth at bedtime. On day 5, take 1 tablet by mouth before breakfast and 1 tablet by mouth at bedtime. On day 6, take 1 tablet by mouth before breakfast. Amount is 21 tab.    fluticasone 50 mcg/actuation nasal spray,suspension OTC 50mcg 2 sprays each nostril nasal qd 30d for nasal symptoms due to allergies or sinusitis. Fluticasone needs to be used every day to be effective. It may take up to a week for the full effects of the medication to be seen. Brands to look for include Flonase. Amount is 16 g.    loratadine 10 mg tablet OTC 10mg 1 tab PO qd PRN 30d for allergies or cough. Brands to look for include Claritin. Amount is 30 tab.   The patient's prescription will be sent to:   [email or web address] - [address] [address]   Phone: [phone]     Fax: [phone]   Patient informed to purchase OTC medications.   Education:    Condition and causes    Prevention    Treatment and self-care    When to call provider   ----------   Electronically signed by PAVAN Cruz on 2023-12-06 at 12:46PM   ----------   Patient Interview Transcript:   Which of these symptoms are bothering you? Select all that apply.    Cough    Stuffed-up nose or sinuses    Hoarse voice or loss of voice    Headache    Fatigue or tiredness   Not selected:    Shortness of breath    Runny nose    Itchy or watery eyes    Itchy nose or sneezing    Loss of smell or taste    Sore throat    Fever    Sweats    Chills    Muscle or body aches    Nausea or vomiting    Diarrhea    I don't have any of these symptoms   When did your current symptoms start? Select one.    6 to 9 days ago   Not selected:    Less than 48 hours ago    3 to 5 days ago    10 to 14 days ago    2 to 3 weeks ago    3 to 4 weeks ago    More than a month ago   Do you know the exact date your symptoms started? If so, enter the date as MM/DD/YY. Select one.    Yes (specify): 11/30/23   Not selected:    No   Did your symptoms come on suddenly or gradually? Select one.    Suddenly   Not selected:    Gradually    I'm not sure   Have your symptoms improved at all since they began? Select one.    No   Not selected:    Yes, but they haven't gone away completely    Yes, but then they came back worse than before   Since your current symptoms started, have you been tested for COVID-19? This includes home self-tests as well as nose swab or saliva tests done at a doctor's office, lab, or testing site. Select one.    No   Not selected:    Yes   Taking a home COVID test can help your provider give you the best care. - If you have a COVID test kit, take the test now before continuing this interview. - If you choose not to take a test or don't have one, you should  "still continue this interview. Your provider can still help you get care. Do you have a COVID test kit? Select one.    No, I don't have a test kit   Not selected:    Yes, and I'll take a test now    Yes, but I prefer not to take a test now   Has anyone in your household tested positive for COVID-19 in the past 14 days? Select one.    No   Not selected:    Yes   In the last 14 days, have you had close contact with someone who has COVID-19? \"Close contact\" means any of these: - Caring for someone with COVID-19. - Being within 6 feet of someone with COVID-19 for a total of at least 15 minutes over a 24-hour period. For example, three 5-minute exposures for a total of 15 minutes. - Being in direct contact with respiratory droplets from someone with COVID-19 (being coughed on, kissing, sharing utensils). Select one.    No, not that I know of   Not selected:    Yes, a confirmed case    Yes, a suspected case   Have you gotten the [id number] updated COVID-19 vaccine? This means either the updated Pfizer-BioNTech or Moderna vaccine after September 12, 2023; or the updated Novavax vaccine after October 3, 2023. Select one.    No   Not selected:    Yes   In the last 14 days, have you traveled outside of your local community? This includes travel by car, RV, bus, train, or plane. Travel increases your chances of getting and spreading COVID-19. Select one.    No   Not selected:    Yes   You mentioned having a headache. On a scale of 1 to 10, how severe is your headache pain? Select one.    Mild (1 to 3)   Not selected:    Moderate (4 to 6)    Severe (7 to 9)    Unbearable (10)    The worst headache of my life (10+)   Do you cough so hard that it's made you gag or vomit? By gag, we mean has your coughing made you choke or dry heave? Select all that apply.    No   Not selected:    Yes, my coughing has made me gag    Yes, my coughing has made me vomit   When is your cough the worst? Select all that apply.    During the day   Not " "selected:    In the morning, or when I wake up    At nighttime, or while I'm sleeping    I haven't noticed a difference depending on time of day   Are you coughing up mucus or phlegm? Select one.    Yes, a little   Not selected:    No, my cough is dry    Yes, a lot   What color is most of the mucus or phlegm that you're coughing up? Select one.    Clear   Not selected:    White/frothy    Yellow or yellowish    Green or greenish    Red or pink    I'm not sure   Do you feel sinus pain or pressure in any of these areas?    Behind my nose   Not selected:    In my forehead    Around my eyes    In my cheeks    In my upper teeth or jaw    No   When did you first notice your sinus pain or pressure? Select one.    10 to 14 days ago   Not selected:    Less than 5 days ago    5 to 9 days ago    2 to 4 weeks ago    1 month ago or longer   Does coughing, sneezing, or leaning forward make your sinuses feel worse? Select one.    No   Not selected:    Yes   What color is your nasal drainage? Select one.    Clear   Not selected:    White    Yellow or yellowish    Green or greenish    My nose is stuffed but not draining or running   Is your nasal drainage thick or thin? Select one.    Thin   Not selected:    Thick   Is there any drainage (mucus) going down the back of your throat? This kind of drainage is also called \"postnasal drip.\" It can cause frequent throat clearing. Select one.    Yes   Not selected:    No, not that I know of   Since your symptoms started, have you felt dizzy? Select one.    No   Not selected:    Yes, but I can continue with my regular daily activities    Yes, and it makes it hard to stand, walk, or do daily activities   Do you have chest pain? You might also feel it as discomfort, aching, tightness, or squeezing in the chest. Select one.    No   Not selected:    Yes   Have you urinated at least 3 times in the last 24 hours? Select one.    Yes   Not selected:    No   Changes in alertness or awareness may mean " you need emergency care. Since your symptoms started, have you had any of these? Select all that apply.    None of the above   Not selected:    Confusion    Slurred speech    Not knowing where you are or what day it is    Difficulty staying conscious    Fainting or passing out   Do your symptoms include a whistling sound, or wheezing, when you breathe? Select one.    No   Not selected:    Yes   Early in this interview, you told us you were hoarse or you'd lost your voice. How would you describe the changes to your voice? Select one.    I can barely talk at all   Not selected:    It just sounds a little raspy    It's harder than usual to talk   Is it possible that you strained your voice? Singing, yelling, or talking more or louder than usual can cause voice strain. Select one.    No, not that I know of   Not selected:    Yes   Do you have any of these symptoms in your ear(s)? Select all that apply.    None of the above   Not selected:    Pain    Pressure    Fullness    Crackling or popping    Plugged or blocked sensation   Are your glands/lymph nodes swollen, or does it hurt when you touch them?    No, not that I can tell   Not selected:    Yes   In the past week, has anyone around you (such as at school, work, or home) had a confirmed diagnosis of the flu? A confirmed diagnosis means that a nose swab was done to verify a flu infection. Select all that apply.    No, not that I know of   Not selected:    I live with someone who has the flu    I've been within touching distance of someone who has the flu    I've walked by, or sat about 3 feet away from, someone who has the flu    I've been in the same building as someone who has the flu   Have you ever been diagnosed with asthma? Select one.    No   Not selected:    Yes   Have you ever been prescribed albuterol to use for wheezing, cough, or shortness of breath caused by a cold, bronchitis, or pneumonia? Albuterol (ProAir, Proventil, Ventolin) is prescribed as an  inhaler or a solution to be used with a nebulizer machine. Select one.    No, not that I know of   Not selected:    Yes   Have you ever been prescribed a steroid inhaler to use for wheezing, cough, or shortness of breath caused by a cold, bronchitis, or pneumonia? Some examples of steroid inhalers include Pulmicort, Flovent, Qvar, and Alvesco. Select one.    No, not that I know of   Not selected:    Yes   Have you ever been diagnosed with chronic obstructive pulmonary disease (COPD)? Select one.    No, not that I know of   Not selected:    Yes   In the past month, have you taken antibiotics for similar symptoms? Examples of antibiotics include amoxicillin, amoxicillin-clavulanate (Augmentin), penicillin, cefdinir (Omnicef), doxycycline, and clindamycin (Cleocin). Select one.    No   Not selected:    Yes (specify)   In the last year, how many times were you treated with antibiotics for a sinus infection? Select one.    None   Not selected:    1 to 3 times    4 or more times   Have you been diagnosed with a deviated septum or nasal polyps? The nose is divided into two nostrils by the septum. A crooked septum is called a deviated septum. Nasal polyps are growths inside the nose or sinuses. Select one.    No, not that I know of   Not selected:    Yes, but I had surgery to treat them    Yes, I have a deviated septum    Yes, I have nasal polyps    Yes, I have a deviated septum and nasal polyps   Do you have a sore inside your nose that won't heal? Select one.    No, not that I know of   Not selected:    Yes   Do you have allergies (pollen, dust mites, mold, animal dander)? Select one.    No, not that I know of   Not selected:    Yes   Have you had a flu shot this season? Select one.    Yes, 1 to 3 months ago   Not selected:    Yes, less than 2 weeks ago    Yes, 2 to 4 weeks ago    Yes, 3 to 6 months ago    Yes, more than 6 months ago    No   Have you gone through menopause? Select one.    Yes   Not selected:    No    I'm  going through it now   The flu and COVID-19 can be more serious for people in certain groups. The next few questions help us figure out if you or anyone you live with is at higher risk for complications from these infections. Do any of these statements apply to you? Select all that apply.    None of the above   Not selected:    I'm     I'm     I'm Black    I'm  or    Do you smoke tobacco? Select one.    No   Not selected:    Yes, every day    Yes, some days    No, I quit   Do you have a mostly inactive lifestyle? Answer yes if all of these are true: - You spend at least 6 hours a day sitting or lying down - You get less than 2 and a half hours per week of moderate exercise such as walking fast - You get less than 1 hour and 15 minutes per week of intense exercise such as jogging or running Select one.    Yes   Not selected:    No   Do you have any of these conditions? Select all that apply.    None of the above   Not selected:    Chronic lung disease, such as cystic fibrosis or interstitial fibrosis    Heart disease, such as congenital heart disease, congestive heart failure, or coronary artery disease    Disorder of the brain, spinal cord, or nerves and muscles, such as dementia, cerebral palsy, epilepsy, muscular dystrophy, or developmental delay    Metabolic disorder or mitochondrial disease    Cerebrovascular disease, such as stroke or another condition affecting the blood vessels or blood supply to the brain    Down syndrome    Mood disorder, including depression or schizophrenia spectrum disorders    Substance use disorder, such as alcohol, opioid, or cocaine use disorder    Tuberculosis    Primary immunodeficiency   Do you live in a group care setting? Examples include: - Nursing home - Residential care - Psychiatric treatment facility - Group home - Dormitory - Board and care home - Homeless shelter - Foster care setting Select one.    No   Not selected:    Yes   Are  you a healthcare worker? Select one.    No   Not selected:    Yes   People with a very high body mass index (BMI) are at higher risk for developing complications from the flu and severe illness from COVID-19. To determine your BMI, we need to know your weight and height. Please enter your weight (in pounds).    Weight   Please enter your height.    Height   Do you have any of these conditions that can affect the immune system? Scroll to see all options. Select all that apply.    None of these   Not selected:    History of bone marrow transplant    Chronic kidney disease    Chronic liver disease (including cirrhosis)    HIV/AIDS    Inflammatory bowel disease (Crohn's disease or ulcerative colitis)    Lupus    Moderate to severe plaque psoriasis    Multiple sclerosis    Rheumatoid arthritis    Sickle cell anemia    Alpha or beta thalassemia    History of solid organ transplant (kidney, liver, or heart)    History of spleen removal    An autoimmune disorder not listed here (specify)    A condition requiring treatment with long-term use of oral steroids (such as prednisone, prednisolone, or dexamethasone) (specify)   Have you ever been diagnosed with cancer? Select one.    No   Not selected:    Yes, I have cancer now    Yes, but I'm in remission   Do any of these apply to you? Select all that apply.    None of the above   Not selected:    I've been hospitalized within the last 5 days    I have diabetes    I'm in close contact with a child in    The flu and COVID-19 can be more serious for people in certain groups. Do any of these apply to the people who live with you? Select all that apply.    None of the above   Not selected:    Under age 5    Over age 65            Black     or     Pregnant    Has given birth, had a miscarriage, had a pregnancy loss, or had an  in the last 2 weeks   Does any member of your household have any of these medical conditions? Select  all that apply.    None of the above   Not selected:    Asthma    Disorders of the brain, spinal cord, or nerves and muscles, such as dementia, cerebral palsy, epilepsy, muscular dystrophy, or developmental delay    Chronic lung disease, such as COPD or cystic fibrosis    Heart disease, such as congenital heart disease, congestive heart failure, or coronary artery disease    Cerebrovascular disease, such as stroke or another condition affecting the blood vessels or blood supply to the brain    Blood disorders, such as sickle cell disease    Diabetes    Metabolic disorders such as inherited metabolic disorders or mitochondrial disease    Kidney disorders    Liver disorders    Weakened immune system due to illness or medications such as chemotherapy or steroids    Children under the age of 19 who are on long-term aspirin therapy    Extreme obesity (BMI > 40)   Do you have any of these conditions? Scroll to see all options. Select all that apply.    High blood pressure   Not selected:    Aspirin triad (also known as Samter's triad or ASA triad)    Asthma or hives from taking aspirin or other NSAIDs, such as ibuprofen or naproxen    Blockage or narrowing of the blood vessels of the heart    Blood clotting disorder    Blood dyscrasia, such anemia, leukemia, lymphoma, or myeloma    Bone marrow depression    Catecholamine-releasing paraganglioma    Congenital long QT syndrome    Depression    Difficulty urinating or completely emptying your bladder    Uncorrected electrolyte abnormalities    Fungal infection    Gastrointestinal (GI) bleeding    Gastrointestinal (GI) obstruction    G6PD deficiency    Recent heart attack    Irregular heartbeat or heart rhythm    Mononucleosis (mono)    Myasthenia gravis    Parkinson's disease    Pheochromocytoma    Reye syndrome    Seizure disorder    Thyroid disease    Ulcerative colitis    None of the above   Have you ever had either of these conditions? Select all that apply.    No   Not  selected:    Metoclopramide-associated dystonic reaction    Tardive dyskinesia   Just a few more questions about medications, and then you're finished. Have you used any non-prescription medications or nasal sprays for your current symptoms? Examples include saline sprays, decongestants, NyQuil, and Tylenol. Select one.    No   Not selected:    Yes   Have you taken any monoamine oxidase inhibitor (MAOI) medications in the last 14 days? Examples include rasagiline (Azilect), selegiline (Eldepryl, Zelapar), isocarboxazid (Marplan), phenelzine (Nardil), and tranylcypromine (Parnate). Select one.    No, not that I know of   Not selected:    Yes   Do you take Kynmobi or Apokyn (apomorphine)? Select one.    No   Not selected:    Yes   Are you still taking these medications listed in your medical record? If you're not taking any of these, click Next. Select all that apply.    fish oil 1000 MG capsule capsule    levothyroxine 50 MCG tablet    vitamin D 1.25 MG (31496 UT) capsule capsule    Wegovy 1.7 MG/0.75ML solution auto-injector    Loratadine 10 MG capsule   Are you taking any other medications, vitamins, or supplements? Select one.    No   Not selected:    Yes   Have you ever had an allergic or bad reaction to any medication? Select one.    No   Not selected:    Yes   Are you allergic to milk or to the proteins found in milk (for example, whey or casein)? A milk allergy is different from lactose intolerance. Select one.    No, not that I know of   Not selected:    Yes   Have you ever had jaundice or liver problems as a result of taking amoxicillin-clavulanate (Augmentin)? Jaundice is a condition in which the skin and the whites of the eyes turn yellow. Select all that apply.    No, not that I know of   Not selected:    Yes, jaundice    Yes, liver problems   Have you ever had jaundice or liver problems as a result of taking azithromycin (Zithromax, Zmax)? Jaundice is a condition in which the skin and the whites of the  eyes turn yellow. Select all that apply.    No, not that I know of   Not selected:    Yes, jaundice    Yes, liver problems   Do you need a doctor's note? A doctor's note confirms that you received care today and states when you can return to school or work. It does not contain information about your diagnosis or treatment plan. Your provider will make the final decision on whether to give you a doctor's note and for how long. Doctor's notes CANNOT be backdated. We can't provide medical leave paperwork through this type of visit. If more paperwork is needed to request time off, contact your primary care provider. Select one.    No   Not selected:    Today only (1 day)    Today and tomorrow (2 days)    3 days    5 days    7 days    10 days    14 days   Is there anything you'd like to add about your symptoms? Please limit your comments to the symptoms asked about in this interview. If you include comments about other concerns, your provider may recommend that you be seen in person.    I feel like I have drainage stuck in my throat. I have no voice at all.   ----------   Medical history   Medical history data does not currently exist for this patient.

## 2023-12-07 NOTE — PROGRESS NOTES
"Chief Complaint  Obesity    Subjective          Jaquan Carlson presents to [facility]  History of Present Illness    Obesity  This is a chronic problem. The current episode started more than 1 year ago. The problem has been rapidly improving. Treatments tried: wegovy. The treatment provided significant relief.     Review of Systems      Objective   Vital Signs:   /74 (BP Location: Right arm, Patient Position: Sitting, Cuff Size: Adult)   Pulse 88   Temp 97.1 °F (36.2 °C) (Temporal)   Ht 162.6 cm (64.02\")   Wt 79.8 kg (176 lb)   SpO2 99%   BMI 30.19 kg/m²     Physical Exam  Constitutional:       General: She is not in acute distress.     Appearance: Normal appearance. She is well-developed and well-groomed. She is not ill-appearing, toxic-appearing or diaphoretic.   HENT:      Head: Normocephalic.      Nose: Nose normal. No congestion or rhinorrhea.      Mouth/Throat:      Mouth: Mucous membranes are moist.      Pharynx: Oropharynx is clear. No oropharyngeal exudate or posterior oropharyngeal erythema.   Eyes:      General: Lids are normal.         Right eye: No discharge.         Left eye: No discharge.      Extraocular Movements: Extraocular movements intact.      Pupils: Pupils are equal, round, and reactive to light.   Neck:      Vascular: No carotid bruit.   Cardiovascular:      Rate and Rhythm: Normal rate and regular rhythm.      Pulses: Normal pulses.      Heart sounds: Normal heart sounds. No murmur heard.     No friction rub. No gallop.   Pulmonary:      Effort: Pulmonary effort is normal. No respiratory distress.      Breath sounds: Normal breath sounds. No stridor. No wheezing, rhonchi or rales.   Chest:      Chest wall: No tenderness.   Abdominal:      General: Bowel sounds are normal. There is no distension.      Palpations: Abdomen is soft. There is no mass.      Tenderness: There is no abdominal tenderness. There is no right CVA tenderness, left CVA " tenderness, guarding or rebound.      Hernia: No hernia is present.   Musculoskeletal:         General: No swelling or tenderness. Normal range of motion.      Cervical back: Normal range of motion and neck supple. No rigidity or tenderness.      Right lower leg: No edema.      Left lower leg: No edema.   Lymphadenopathy:      Cervical: No cervical adenopathy.   Skin:     General: Skin is warm.      Capillary Refill: Capillary refill takes less than 2 seconds.      Coloration: Skin is not jaundiced.      Findings: No bruising, erythema or rash.   Neurological:      General: No focal deficit present.      Mental Status: She is alert and oriented to person, place, and time.      Motor: Motor function is intact. No weakness.      Coordination: Coordination is intact.      Gait: Gait is intact. Gait normal.   Psychiatric:         Attention and Perception: Attention normal.         Mood and Affect: Mood normal.         Speech: Speech normal.         Behavior: Behavior normal.         Cognition and Memory: Cognition normal.         Judgment: Judgment normal.        Result Review :                 Assessment and Plan    Diagnoses and all orders for this visit:    1. Weight loss (Primary)  Comments:  continue wegovy  Orders:  -     CBC Auto Differential; Future  -     Comprehensive Metabolic Panel; Future  -     Hemoglobin A1c; Future  -     Lipid Panel; Future  -     T4, Free; Future  -     TSH; Future      Patient's Body mass index is 30.19 kg/m². indicating that she is obese (BMI >30). Obesity-related health conditions include the following: none. Obesity is unchanged. BMI is is above average; BMI management plan is completed. We discussed portion control and increasing exercise..    Follow Up   Return in about 3 months (around 3/4/2024), or if symptoms worsen or fail to improve.  Patient was given instructions and counseling regarding her condition or for health maintenance advice. Please see specific information pulled  into the AVS if appropriate.     This document has been electronically signed by PAVAN Castaneda  December 7, 2023 11:02 EST

## 2023-12-28 ENCOUNTER — DISEASE STATE MANAGEMENT VISIT (OUTPATIENT)
Dept: PHARMACY | Facility: HOSPITAL | Age: 53
End: 2023-12-28
Payer: COMMERCIAL

## 2023-12-28 VITALS
HEART RATE: 70 BPM | SYSTOLIC BLOOD PRESSURE: 137 MMHG | WEIGHT: 177.4 LBS | BODY MASS INDEX: 30.29 KG/M2 | DIASTOLIC BLOOD PRESSURE: 73 MMHG | HEIGHT: 64 IN

## 2023-12-28 RX ORDER — SEMAGLUTIDE 2.4 MG/.75ML
2.4 INJECTION, SOLUTION SUBCUTANEOUS WEEKLY
Qty: 3 ML | Refills: 0 | Status: SHIPPED | OUTPATIENT
Start: 2023-12-28

## 2023-12-28 NOTE — PROGRESS NOTES
[facility]  Weight Management Program        Jaquan Carlson is a 53 y.o. 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  Jaquan Carlson has previously tried Saxenda, Adipex, Wegovy, Mounjaro and lifestyle changes for weight loss.  Current weight loss efforts includes Wegovy. The patient denies a personal history or family history of thyroid cancer and denies a personal history of pancreatitis. Denies hoarseness/swelling/lumps in throat or severe abdominal pain.     Patient is currently on Wegovy 1.7 mg weekly. Patient denies any lumps/swelling/hoarseness in neck/throat or abdominal pain. Patient reports tolerating medication well without any side effects.  Patient restarted on Wegovy 1.7 mg after holding for a few weeks with colonoscopy and reported she had no issue re-starting. Patient is ready to titrate back to the 2.4 mg dose today for more adequate control. Patient had follow up with Nicole Garcia and reported that she discussed BP, cholesterol, and how she has had issues with her voice returning 100% since colonoscopy. Patient reported that Nicole wanted her to start on fish oil first with her cholesterol and she is using her BP med PRN when BP gets high.   Patient reports that she has been consistently meeting her SMART goals but would like to leave them the same for now.      Relevant Past Medical History and Co-morbidities  Past Medical History:   Diagnosis Date    Diarrhea     Dyslipidemia 11/02/2022    Hypertensive disorder 10/07/2021    Hypothyroidism 12/06/2021    Umbilical hernia      Social History     Socioeconomic History    Marital status:    Tobacco Use    Smoking status: Never    Smokeless tobacco: Never   Vaping Use    Vaping Use: Never used   Substance and Sexual Activity    Alcohol use: No    Drug use: No    Sexual activity: Yes      Partners: Male     Birth control/protection: Surgical       Allergies  Penicillins    Current Medication List    Current Outpatient Medications:     levothyroxine (Synthroid) 50 MCG tablet, Take 1 tablet by mouth Daily., Disp: 90 tablet, Rfl: 0    Loratadine 10 MG capsule, Take 1 capsule by mouth Daily., Disp: , Rfl:     methylPREDNISolone (MEDROL) 4 MG dose pack, TAKE BY MOUTH AS DIRECTED ON PACKAGE., Disp: 21 tablet, Rfl: 0    Omega-3 Fatty Acids (fish oil) 1000 MG capsule capsule, Take 1 capsule by mouth 2 (Two) Times a Day With Meals., Disp: 60 capsule, Rfl: 5    ondansetron ODT (ZOFRAN-ODT) 4 MG disintegrating tablet, Dissolve 1 tablet on the tongue Every 12 (Twelve) Hours As Needed for Nausea or Vomiting., Disp: 45 tablet, Rfl: 1    Semaglutide-Weight Management (Wegovy) 1.7 MG/0.75ML solution auto-injector, Inject 0.75 mL under the skin into the appropriate area as directed 1 (One) Time Per Week., Disp: 3 mL, Rfl: 0    vitamin D (ERGOCALCIFEROL) 1.25 MG (40055 UT) capsule capsule, TAKE 1 CAPSULE BY MOUTH WEEKLY, Disp: 4 capsule, Rfl: 3    Drug Interactions  None     Relevant Laboratory Values  Lab Results   Component Value Date    CHOL 223 (H) 08/25/2023    CHLPL 199 08/15/2014    TRIG 102 08/25/2023    HDL 64 (H) 08/25/2023     (H) 08/25/2023     Vaccinations:   Patient recommended to keep up with routine vaccinations.     Goals of Therapy  Clinical Goals or Therapeutic Targets: 10% weight loss goal      Date 5/2/22 6/2/22 6/30/22 7/28/22 8/25/55 9/22/22   Weight (kg) 91.4 kg  201.08lb 190.6lb 185.4lb 180lb 181lb 175.6lb   BMI kg/ 34.6 32.71 31.8 30.9 31 30   Waist Circumference (in)  42 in 41.5in 40in 40in 39.75in 39.75     Date 10/20/22 11/17/22 12/15/22 1/12/23 2/9/23 3/9/23 4/6/23 5/4/23 6/1/23 6/29/23   Weight (kg) 170 lbs 170.6 lbs 165.8lbs 164.6 lbs 162.6 lb 162.4 lb 163 lb 162 lb 164 lbs 162 lb   BMI kg/ 29.18 29.28 28.46 28.25 27.91 27.88 27.98 27.81 28.14 27.79   Waist Circumference (in)   "38 in 37 in 36.5 in 37.75\" 36\" 37.5\" 36\" 35.5\" 37\" 36\"     Date 7/26/23 8/23/23 9/21/23 10/17/23 12/4/23 12/28/23   Weight (kg) 165.4 166 lb 166.2 lb 166.8 lbs 175.2 lb 177.6 lb   BMI kg/ 28.37 28.48 28.51 28.61 30.05 30.43   Waist Circumference (in)  37.5 in 36\" 39\" 38.5\" 38\" 39.5\"       Medication Assessment & Plan    Patient's current BMI is 30.43, which is considered overweight. Patient has lost 35 lbs overall. Congratulated her on her success! Patient has been maintaining weight/slight weight gain. Patient has been on lower dose of Wegovy and had held for colonoscopy.     Will order Wegovy 2.4 SC weekly.     The following medications will need dose adjustments/closer monitoring once the GLP1 is started: levothyroxine     Patient just had follow up with Nicole Garcia on 12/4 with next follow-up scheduled in March. Patient will get lab work completed prior to this time.     Discussed lifestyle modifications, including diet and exercise.  Patient education provided.       Worked with patient to create SMART Goal(s): Patient would like to continue these goals  1. Increase water intake to drink 80 oz of water everyday.   2. Continue to Achieve/Track 10,000 steps every day via her watch.  3. Begin weight bearing exercise at least 2 days/week.     Will follow-up in 4 weeks.     Makenna Snell. Caitlin, PharmD  12/28/2023  08:49 EST  "

## 2024-01-17 ENCOUNTER — TELEPHONE (OUTPATIENT)
Dept: FAMILY MEDICINE CLINIC | Facility: CLINIC | Age: 54
End: 2024-01-17
Payer: COMMERCIAL

## 2024-01-22 NOTE — TELEPHONE ENCOUNTER
Spoke to patient in regards to overdue lab work. Patient said she was told not to do it till Feb.

## 2024-01-25 ENCOUNTER — DISEASE STATE MANAGEMENT VISIT (OUTPATIENT)
Dept: PHARMACY | Facility: HOSPITAL | Age: 54
End: 2024-01-25
Payer: COMMERCIAL

## 2024-01-25 VITALS
WEIGHT: 176.2 LBS | BODY MASS INDEX: 30.23 KG/M2 | HEART RATE: 86 BPM | SYSTOLIC BLOOD PRESSURE: 111 MMHG | DIASTOLIC BLOOD PRESSURE: 57 MMHG

## 2024-01-25 DIAGNOSIS — Z76.0 MEDICATION REFILL: ICD-10-CM

## 2024-01-25 DIAGNOSIS — E03.9 HYPOTHYROIDISM, UNSPECIFIED TYPE: ICD-10-CM

## 2024-01-25 RX ORDER — SEMAGLUTIDE 2.4 MG/.75ML
2.4 INJECTION, SOLUTION SUBCUTANEOUS WEEKLY
Qty: 3 ML | Refills: 0 | Status: SHIPPED | OUTPATIENT
Start: 2024-01-25

## 2024-01-25 NOTE — PROGRESS NOTES
[facility]  Weight Management Program        Jaquan Carlson is a 53 y.o. female referred to the [facility] by Nicole Gracia for clinical pharmacy and specialty pharmacy management of GLP-1 Receptor Agonists for weight management.  Jaquan Carlson has previously tried Saxenda, Adipex, Wegovy, Mounjaro and lifestyle changes for weight loss.  Current weight loss efforts includes Wegovy. The patient denies a personal history or family history of thyroid cancer and denies a personal history of pancreatitis. Denies hoarseness/swelling/lumps in throat or severe abdominal pain.     Patient is currently on Wegovy 2.4 mg weekly. Patient denies any lumps/swelling/hoarseness in neck/throat or abdominal pain. Patient reports tolerating medication well without any side effects.      At previous visit, patient reported that Nicole wanted her to start on fish oil first with her cholesterol and she is using her BP med PRN when BP gets high. BP in clinic was 111/57.     Patient reports that she has been doing doing well with her step count goal, but states she has slacked on her water and workout goals during the holidays. Patient would like to continue targeting her current SMART goals.       Relevant Past Medical History and Co-morbidities  Past Medical History:   Diagnosis Date    Diarrhea     Dyslipidemia 11/02/2022    Hypertensive disorder 10/07/2021    Hypothyroidism 12/06/2021    Umbilical hernia      Social History     Socioeconomic History    Marital status:    Tobacco Use    Smoking status: Never    Smokeless tobacco: Never   Vaping Use    Vaping Use: Never used   Substance and Sexual Activity    Alcohol use: No    Drug use: No    Sexual activity: Yes     Partners: Male     Birth control/protection: Surgical       Allergies  Penicillins    Current Medication List    Current Outpatient Medications:     levothyroxine (Synthroid) 50 MCG tablet, Take 1 tablet by mouth Daily.,  "Disp: 90 tablet, Rfl: 0    Loratadine 10 MG capsule, Take 1 capsule by mouth Daily., Disp: , Rfl:     Omega-3 Fatty Acids (fish oil) 1000 MG capsule capsule, Take 1 capsule by mouth 2 (Two) Times a Day With Meals., Disp: 60 capsule, Rfl: 5    ondansetron ODT (ZOFRAN-ODT) 4 MG disintegrating tablet, Dissolve 1 tablet on the tongue Every 12 (Twelve) Hours As Needed for Nausea or Vomiting., Disp: 45 tablet, Rfl: 1    Semaglutide-Weight Management (Wegovy) 2.4 MG/0.75ML solution auto-injector, Inject 2.4 mg under the skin into the appropriate area as directed 1 (One) Time Per Week., Disp: 3 mL, Rfl: 0    vitamin D (ERGOCALCIFEROL) 1.25 MG (73856 UT) capsule capsule, TAKE 1 CAPSULE BY MOUTH WEEKLY, Disp: 4 capsule, Rfl: 3    Drug Interactions  None     Relevant Laboratory Values  Lab Results   Component Value Date    CHOL 223 (H) 08/25/2023    CHLPL 199 08/15/2014    TRIG 102 08/25/2023    HDL 64 (H) 08/25/2023     (H) 08/25/2023     Vaccinations:   Patient recommended to keep up with routine vaccinations.     Goals of Therapy  Clinical Goals or Therapeutic Targets: 10% weight loss goal      Date 5/2/22 6/2/22 6/30/22 7/28/22 8/25/55 9/22/22   Weight (kg) 91.4 kg  201.08lb 190.6lb 185.4lb 180lb 181lb 175.6lb   BMI kg/ 34.6 32.71 31.8 30.9 31 30   Waist Circumference (in)  42 in 41.5in 40in 40in 39.75in 39.75     Date 10/20/22 11/17/22 12/15/22 1/12/23 2/9/23 3/9/23 4/6/23 5/4/23 6/1/23 6/29/23   Weight (kg) 170 lbs 170.6 lbs 165.8lbs 164.6 lbs 162.6 lb 162.4 lb 163 lb 162 lb 164 lbs 162 lb   BMI kg/ 29.18 29.28 28.46 28.25 27.91 27.88 27.98 27.81 28.14 27.79   Waist Circumference (in)  38 in 37 in 36.5 in 37.75\" 36\" 37.5\" 36\" 35.5\" 37\" 36\"     Date 7/26/23 8/23/23 9/21/23 10/17/23 12/4/23 12/28/23 1/25/24   Weight (kg) 165.4 166 lb 166.2 lb 166.8 lbs 175.2 lb 177.6 lb 176.2 lb   BMI kg/ 28.37 28.48 28.51 28.61 30.05 30.43 30.23   Waist Circumference (in)  37.5 in 36\" 39\" 38.5\" 38\" 39.5\" 41\"       Medication " Assessment & Plan    Patient's current BMI is 30.23, which is considered overweight. Patient has lost ~25 lbs overall. Congratulated her on her success! Patient has been maintaining weight/slight weight gain.     Will re-order Wegovy 2.4 SC weekly.     The following medications will need dose adjustments/closer monitoring once the GLP1 is started: levothyroxine     Patient will get lab work completed prior to her follow-up with Nicole Garcia in March.   Most recent lab work from August. Thyroid and A1C WNL. CMP was unremarkable. Lipid panel showed an elevated LDL at 141. Will review labs in March to see if any changes were made.     Discussed lifestyle modifications, including diet and exercise.  Patient education provided.     Worked with patient to create SMART Goal(s): Patient would like to continue these goals  1. Increase water intake to drink 80 oz of water everyday.   2. Continue to Achieve/Track 10,000 steps every day via her watch.  3. Begin weight bearing exercise at least 2 days/week.     Will follow-up in 4 weeks.     Meagan Chambers RPH  1/25/2024  08:40 EST

## 2024-01-29 RX ORDER — LEVOTHYROXINE SODIUM 0.05 MG/1
50 TABLET ORAL DAILY
Qty: 90 TABLET | Refills: 0 | Status: SHIPPED | OUTPATIENT
Start: 2024-01-29

## 2024-02-11 DIAGNOSIS — E55.9 VITAMIN D DEFICIENCY: ICD-10-CM

## 2024-02-12 RX ORDER — ERGOCALCIFEROL 1.25 MG/1
CAPSULE ORAL
Qty: 4 CAPSULE | Refills: 3 | Status: SHIPPED | OUTPATIENT
Start: 2024-02-12

## 2024-02-22 ENCOUNTER — DISEASE STATE MANAGEMENT VISIT (OUTPATIENT)
Dept: PHARMACY | Facility: HOSPITAL | Age: 54
End: 2024-02-22
Payer: COMMERCIAL

## 2024-02-22 VITALS
SYSTOLIC BLOOD PRESSURE: 151 MMHG | HEART RATE: 67 BPM | DIASTOLIC BLOOD PRESSURE: 68 MMHG | WEIGHT: 179 LBS | BODY MASS INDEX: 30.71 KG/M2

## 2024-02-22 RX ORDER — SEMAGLUTIDE 2.4 MG/.75ML
2.4 INJECTION, SOLUTION SUBCUTANEOUS WEEKLY
Qty: 3 ML | Refills: 0 | Status: SHIPPED | OUTPATIENT
Start: 2024-02-22

## 2024-02-22 NOTE — PROGRESS NOTES
[facility]  Weight Management Program        Jaquan Carlson is a 53 y.o. 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  Jaquan Carlson has previously tried Saxenda, Adipex, Wegovy, Mounjaro and lifestyle changes for weight loss.  Current weight loss efforts includes Wegovy. The patient denies a personal history or family history of thyroid cancer and denies a personal history of pancreatitis. Denies hoarseness/swelling/lumps in throat or severe abdominal pain.     Patient is currently on Wegovy 2.4 mg weekly. Patient denies any lumps/swelling/hoarseness in neck/throat or abdominal pain. Patient reports tolerating medication well without any side effects.      At previous visit, patient reported that Nicole wanted her to start on fish oil first with her cholesterol and she is using her BP med PRN when BP gets high. BP in clinic was 111/57.     Patient reports that she has been doing doing well with her step count goal, but states she struggles meeting her water goal on the weekends at home. Patient would like to continue targeting her current SMART goals. Does mention she would like to start meal planning.       Relevant Past Medical History and Co-morbidities  Past Medical History:   Diagnosis Date    Diarrhea     Dyslipidemia 11/02/2022    Hypertensive disorder 10/07/2021    Hypothyroidism 12/06/2021    Umbilical hernia      Social History     Socioeconomic History    Marital status:    Tobacco Use    Smoking status: Never    Smokeless tobacco: Never   Vaping Use    Vaping Use: Never used   Substance and Sexual Activity    Alcohol use: No    Drug use: No    Sexual activity: Yes     Partners: Male     Birth control/protection: Surgical       Allergies  Penicillins    Current Medication List    Current Outpatient Medications:     levothyroxine (Synthroid)  "50 MCG tablet, Take 1 tablet by mouth Daily., Disp: 90 tablet, Rfl: 0    Loratadine 10 MG capsule, Take 1 capsule by mouth Daily., Disp: , Rfl:     Omega-3 Fatty Acids (fish oil) 1000 MG capsule capsule, Take 1 capsule by mouth 2 (Two) Times a Day With Meals., Disp: 60 capsule, Rfl: 5    ondansetron ODT (ZOFRAN-ODT) 4 MG disintegrating tablet, Dissolve 1 tablet on the tongue Every 12 (Twelve) Hours As Needed for Nausea or Vomiting., Disp: 45 tablet, Rfl: 1    Semaglutide-Weight Management (Wegovy) 2.4 MG/0.75ML solution auto-injector, Inject 2.4 mg under the skin into the appropriate area as directed 1 (One) Time Per Week., Disp: 3 mL, Rfl: 0    vitamin D (ERGOCALCIFEROL) 1.25 MG (41971 UT) capsule capsule, TAKE 1 CAPSULE BY MOUTH WEEKLY, Disp: 4 capsule, Rfl: 3    Drug Interactions  None     Relevant Laboratory Values  Lab Results   Component Value Date    CHOL 223 (H) 08/25/2023    CHLPL 199 08/15/2014    TRIG 102 08/25/2023    HDL 64 (H) 08/25/2023     (H) 08/25/2023     Vaccinations:   Patient recommended to keep up with routine vaccinations.     Goals of Therapy  Clinical Goals or Therapeutic Targets: 10% weight loss goal      Date 5/2/22 6/2/22 6/30/22 7/28/22 8/25/55 9/22/22   Weight (kg) 91.4 kg  201.08lb 190.6lb 185.4lb 180lb 181lb 175.6lb   BMI kg/ 34.6 32.71 31.8 30.9 31 30   Waist Circumference (in)  42 in 41.5in 40in 40in 39.75in 39.75     Date 10/20/22 11/17/22 12/15/22 1/12/23 2/9/23 3/9/23 4/6/23 5/4/23 6/1/23 6/29/23   Weight (kg) 170 lbs 170.6 lbs 165.8lbs 164.6 lbs 162.6 lb 162.4 lb 163 lb 162 lb 164 lbs 162 lb   BMI kg/ 29.18 29.28 28.46 28.25 27.91 27.88 27.98 27.81 28.14 27.79   Waist Circumference (in)  38 in 37 in 36.5 in 37.75\" 36\" 37.5\" 36\" 35.5\" 37\" 36\"     Date 7/26/23 8/23/23 9/21/23 10/17/23 12/4/23 12/28/23 1/25/24 2/12/24   Weight (kg) 165.4 166 lb 166.2 lb 166.8 lbs 175.2 lb 177.6 lb 176.2 lb 179 lb   BMI kg/ 28.37 28.48 28.51 28.61 30.05 30.43 30.23    Waist Circumference " "(in)  37.5 in 36\" 39\" 38.5\" 38\" 39.5\" 41\" 40\"       Medication Assessment & Plan    Patient's current BMI is 30.71, which is considered overweight. Patient has lost ~25 lbs overall. Congratulated her on her success!   Patient has been maintaining weight/slight weight gain.     Will re-order Wegovy 2.4 SC weekly.     The following medications will need dose adjustments/closer monitoring once the GLP1 is started: levothyroxine     Patient will get lab work completed prior to her follow-up with Nicole Garcia in March.   Most recent lab work from August. Thyroid and A1C WNL. CMP was unremarkable. Lipid panel showed an elevated LDL at 141.   Will review labs in March to see if any changes were made.     Discussed lifestyle modifications, including diet and exercise.  Patient education provided.     Worked with patient to create SMART Goal(s): Patient would like to continue these goals  1. Drink 80 oz of water everyday.   2. Continue to Achieve/Track 10,000 steps every day via her watch.  3. Begin weight bearing exercise at least 2 days/week.   4. Meal Plan Dinner 2 times a week. .    Will follow-up in 4 weeks.     Meagan Chambers RPH  2/22/2024  08:29 EST  "

## 2024-02-27 ENCOUNTER — LAB (OUTPATIENT)
Dept: LAB | Facility: HOSPITAL | Age: 54
End: 2024-02-27
Payer: COMMERCIAL

## 2024-02-27 DIAGNOSIS — R63.4 WEIGHT LOSS: ICD-10-CM

## 2024-02-27 LAB
ALBUMIN SERPL-MCNC: 4.4 G/DL (ref 3.5–5.2)
ALBUMIN/GLOB SERPL: 1.3 G/DL
ALP SERPL-CCNC: 85 U/L (ref 39–117)
ALT SERPL W P-5'-P-CCNC: 24 U/L (ref 1–33)
ANION GAP SERPL CALCULATED.3IONS-SCNC: 13.1 MMOL/L (ref 5–15)
AST SERPL-CCNC: 20 U/L (ref 1–32)
BASOPHILS # BLD AUTO: 0.05 10*3/MM3 (ref 0–0.2)
BASOPHILS NFR BLD AUTO: 0.8 % (ref 0–1.5)
BILIRUB SERPL-MCNC: 0.4 MG/DL (ref 0–1.2)
BUN SERPL-MCNC: 16 MG/DL (ref 6–20)
BUN/CREAT SERPL: 13.3 (ref 7–25)
CALCIUM SPEC-SCNC: 9.3 MG/DL (ref 8.6–10.5)
CHLORIDE SERPL-SCNC: 107 MMOL/L (ref 98–107)
CHOLEST SERPL-MCNC: 231 MG/DL (ref 0–200)
CO2 SERPL-SCNC: 22.9 MMOL/L (ref 22–29)
CREAT SERPL-MCNC: 1.2 MG/DL (ref 0.57–1)
DEPRECATED RDW RBC AUTO: 39.9 FL (ref 37–54)
EGFRCR SERPLBLD CKD-EPI 2021: 53.9 ML/MIN/1.73
EOSINOPHIL # BLD AUTO: 0.07 10*3/MM3 (ref 0–0.4)
EOSINOPHIL NFR BLD AUTO: 1.1 % (ref 0.3–6.2)
ERYTHROCYTE [DISTWIDTH] IN BLOOD BY AUTOMATED COUNT: 12.6 % (ref 12.3–15.4)
GLOBULIN UR ELPH-MCNC: 3.3 GM/DL
GLUCOSE SERPL-MCNC: 104 MG/DL (ref 65–99)
HBA1C MFR BLD: 5.4 % (ref 4.8–5.6)
HCT VFR BLD AUTO: 43.9 % (ref 34–46.6)
HDLC SERPL-MCNC: 67 MG/DL (ref 40–60)
HGB BLD-MCNC: 15.3 G/DL (ref 12–15.9)
IMM GRANULOCYTES # BLD AUTO: 0.01 10*3/MM3 (ref 0–0.05)
IMM GRANULOCYTES NFR BLD AUTO: 0.2 % (ref 0–0.5)
LDLC SERPL CALC-MCNC: 141 MG/DL (ref 0–100)
LDLC/HDLC SERPL: 2.06 {RATIO}
LYMPHOCYTES # BLD AUTO: 3.18 10*3/MM3 (ref 0.7–3.1)
LYMPHOCYTES NFR BLD AUTO: 49.9 % (ref 19.6–45.3)
MCH RBC QN AUTO: 30.3 PG (ref 26.6–33)
MCHC RBC AUTO-ENTMCNC: 34.9 G/DL (ref 31.5–35.7)
MCV RBC AUTO: 86.9 FL (ref 79–97)
MONOCYTES # BLD AUTO: 0.41 10*3/MM3 (ref 0.1–0.9)
MONOCYTES NFR BLD AUTO: 6.4 % (ref 5–12)
NEUTROPHILS NFR BLD AUTO: 2.65 10*3/MM3 (ref 1.7–7)
NEUTROPHILS NFR BLD AUTO: 41.6 % (ref 42.7–76)
NRBC BLD AUTO-RTO: 0 /100 WBC (ref 0–0.2)
PLATELET # BLD AUTO: 313 10*3/MM3 (ref 140–450)
PMV BLD AUTO: 9.6 FL (ref 6–12)
POTASSIUM SERPL-SCNC: 4.1 MMOL/L (ref 3.5–5.2)
PROT SERPL-MCNC: 7.7 G/DL (ref 6–8.5)
RBC # BLD AUTO: 5.05 10*6/MM3 (ref 3.77–5.28)
SODIUM SERPL-SCNC: 143 MMOL/L (ref 136–145)
T4 FREE SERPL-MCNC: 1.39 NG/DL (ref 0.93–1.7)
TRIGL SERPL-MCNC: 130 MG/DL (ref 0–150)
TSH SERPL DL<=0.05 MIU/L-ACNC: 2.62 UIU/ML (ref 0.27–4.2)
VLDLC SERPL-MCNC: 23 MG/DL (ref 5–40)
WBC NRBC COR # BLD AUTO: 6.37 10*3/MM3 (ref 3.4–10.8)

## 2024-02-27 PROCEDURE — 80061 LIPID PANEL: CPT

## 2024-02-27 PROCEDURE — 83036 HEMOGLOBIN GLYCOSYLATED A1C: CPT

## 2024-02-27 PROCEDURE — 84439 ASSAY OF FREE THYROXINE: CPT

## 2024-02-27 PROCEDURE — 80050 GENERAL HEALTH PANEL: CPT

## 2024-02-27 PROCEDURE — 36415 COLL VENOUS BLD VENIPUNCTURE: CPT

## 2024-02-29 ENCOUNTER — TELEPHONE (OUTPATIENT)
Dept: FAMILY MEDICINE CLINIC | Facility: CLINIC | Age: 54
End: 2024-02-29
Payer: COMMERCIAL

## 2024-02-29 NOTE — TELEPHONE ENCOUNTER
----- Message from PAVAN Castaneda sent at 2/29/2024 11:06 AM EST -----  Please call the patient regarding her abnormal result. Her kidney function is elevated. She needs to increase her water intake. Her cholesterol is about the same, limit red meat, pork saturated fats, and fried foods. Increase cardiovascular exercise. Other labs stable.

## 2024-03-04 ENCOUNTER — OFFICE VISIT (OUTPATIENT)
Dept: FAMILY MEDICINE CLINIC | Facility: CLINIC | Age: 54
End: 2024-03-04
Payer: COMMERCIAL

## 2024-03-04 VITALS
HEART RATE: 80 BPM | HEIGHT: 64 IN | DIASTOLIC BLOOD PRESSURE: 70 MMHG | WEIGHT: 180.4 LBS | OXYGEN SATURATION: 97 % | SYSTOLIC BLOOD PRESSURE: 116 MMHG | BODY MASS INDEX: 30.8 KG/M2 | TEMPERATURE: 97.5 F

## 2024-03-04 DIAGNOSIS — E66.9 OBESITY (BMI 30.0-34.9): Primary | ICD-10-CM

## 2024-03-04 PROCEDURE — 99214 OFFICE O/P EST MOD 30 MIN: CPT | Performed by: FAMILY MEDICINE

## 2024-03-04 NOTE — PROGRESS NOTES
"Chief Complaint  Obesity (Follow up )    Subjective          Jaquan Carlson presents to [facility]  History of Present Illness    Obesity  This is a chronic problem. The current episode started more than 1 year ago. The problem has been rapidly improving. Treatments tried: wegovy. The treatment provided significant relief.     Review of Systems      Objective   Vital Signs:   /70 (BP Location: Right arm, Patient Position: Sitting)   Pulse 80   Temp 97.5 °F (36.4 °C) (Temporal)   Ht 162.6 cm (64.02\")   Wt 81.8 kg (180 lb 6.4 oz)   SpO2 97%   BMI 30.95 kg/m²     Physical Exam  Constitutional:       General: She is not in acute distress.     Appearance: Normal appearance. She is well-developed and well-groomed. She is not ill-appearing, toxic-appearing or diaphoretic.   HENT:      Head: Normocephalic.      Nose: Nose normal. No congestion or rhinorrhea.      Mouth/Throat:      Mouth: Mucous membranes are moist.      Pharynx: Oropharynx is clear. No oropharyngeal exudate or posterior oropharyngeal erythema.   Eyes:      General: Lids are normal.         Right eye: No discharge.         Left eye: No discharge.      Extraocular Movements: Extraocular movements intact.      Pupils: Pupils are equal, round, and reactive to light.   Neck:      Vascular: No carotid bruit.   Cardiovascular:      Rate and Rhythm: Normal rate and regular rhythm.      Pulses: Normal pulses.      Heart sounds: Normal heart sounds. No murmur heard.     No friction rub. No gallop.   Pulmonary:      Effort: Pulmonary effort is normal. No respiratory distress.      Breath sounds: Normal breath sounds. No stridor. No wheezing, rhonchi or rales.   Chest:      Chest wall: No tenderness.   Abdominal:      General: Bowel sounds are normal. There is no distension.      Palpations: Abdomen is soft. There is no mass.      Tenderness: There is no abdominal tenderness. There is no right CVA tenderness, left CVA " tenderness, guarding or rebound.      Hernia: No hernia is present.   Musculoskeletal:         General: No swelling or tenderness. Normal range of motion.      Cervical back: Normal range of motion and neck supple. No rigidity or tenderness.      Right lower leg: No edema.      Left lower leg: No edema.   Lymphadenopathy:      Cervical: No cervical adenopathy.   Skin:     General: Skin is warm.      Capillary Refill: Capillary refill takes less than 2 seconds.      Coloration: Skin is not jaundiced.      Findings: No bruising, erythema or rash.   Neurological:      General: No focal deficit present.      Mental Status: She is alert and oriented to person, place, and time.      Motor: Motor function is intact. No weakness.      Coordination: Coordination is intact.      Gait: Gait is intact. Gait normal.   Psychiatric:         Attention and Perception: Attention normal.         Mood and Affect: Mood normal.         Speech: Speech normal.         Behavior: Behavior normal.         Cognition and Memory: Cognition normal.         Judgment: Judgment normal.        Result Review :                 Assessment and Plan    Diagnoses and all orders for this visit:    1. Obesity (BMI 30.0-34.9) (Primary)  Comments:  continue wegovy      Patient's Body mass index is 30.95 kg/m². indicating that she is obese (BMI >30). Obesity-related health conditions include the following: none. Obesity is unchanged. BMI is is above average; BMI management plan is completed. We discussed low calorie, low carb based diet program, portion control, and increasing exercise..    Follow Up   Return in about 3 months (around 6/4/2024), or if symptoms worsen or fail to improve.  Patient was given instructions and counseling regarding her condition or for health maintenance advice. Please see specific information pulled into the AVS if appropriate.     This document has been electronically signed by PAVAN Castaneda  March 4, 2024 14:56 EST

## 2024-03-21 ENCOUNTER — DISEASE STATE MANAGEMENT VISIT (OUTPATIENT)
Dept: PHARMACY | Facility: HOSPITAL | Age: 54
End: 2024-03-21
Payer: COMMERCIAL

## 2024-03-21 VITALS
WEIGHT: 178.4 LBS | SYSTOLIC BLOOD PRESSURE: 150 MMHG | DIASTOLIC BLOOD PRESSURE: 74 MMHG | HEART RATE: 76 BPM | BODY MASS INDEX: 30.61 KG/M2

## 2024-03-21 RX ORDER — SEMAGLUTIDE 2.4 MG/.75ML
2.4 INJECTION, SOLUTION SUBCUTANEOUS WEEKLY
Qty: 3 ML | Refills: 0 | Status: SHIPPED | OUTPATIENT
Start: 2024-03-21

## 2024-03-21 NOTE — PROGRESS NOTES
[facility]  Weight Management Program        Jaquan Carlson is a 53 y.o. 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  Jaquan Carlson has previously tried Saxenda, Adipex, Wegovy, Mounjaro and lifestyle changes for weight loss.  Current weight loss efforts includes Wegovy. The patient denies a personal history or family history of thyroid cancer and denies a personal history of pancreatitis. Denies hoarseness/swelling/lumps in throat or severe abdominal pain.     Patient is currently on Wegovy 2.4 mg weekly. Patient denies any lumps/swelling/hoarseness in neck/throat or abdominal pain. Patient reports tolerating medication well without any side effects.      At previous visit, patient reported that Nicole wanted her to start on fish oil first with her cholesterol and she is using her BP med PRN when BP gets high. BP in clinic was 150/74. Encouraged patient to continue to monitor BP and speak with her PCP about her BP medication.    Patient reports that she has been doing doing well with her step count goal, but states she struggles meeting her water goal on the weekends at home. Patient would like to continue targeting her current SMART goals. Patient is meeting her meal prep goal and wishes to continue this.       Relevant Past Medical History and Co-morbidities  Past Medical History:   Diagnosis Date    Diarrhea     Dyslipidemia 11/02/2022    Hypertensive disorder 10/07/2021    Hypothyroidism 12/06/2021    Umbilical hernia      Social History     Socioeconomic History    Marital status:    Tobacco Use    Smoking status: Never    Smokeless tobacco: Never   Vaping Use    Vaping status: Never Used   Substance and Sexual Activity    Alcohol use: No    Drug use: No    Sexual activity: Yes     Partners: Male     Birth control/protection: Surgical  "      Allergies  Penicillins    Current Medication List    Current Outpatient Medications:     levothyroxine (Synthroid) 50 MCG tablet, Take 1 tablet by mouth Daily., Disp: 90 tablet, Rfl: 0    Loratadine 10 MG capsule, Take 1 capsule by mouth Daily., Disp: , Rfl:     Omega-3 Fatty Acids (fish oil) 1000 MG capsule capsule, Take 1 capsule by mouth 2 (Two) Times a Day With Meals., Disp: 60 capsule, Rfl: 5    ondansetron ODT (ZOFRAN-ODT) 4 MG disintegrating tablet, Dissolve 1 tablet on the tongue Every 12 (Twelve) Hours As Needed for Nausea or Vomiting., Disp: 45 tablet, Rfl: 1    Semaglutide-Weight Management (Wegovy) 2.4 MG/0.75ML solution auto-injector, Inject 2.4 mg under the skin into the appropriate area as directed 1 (One) Time Per Week., Disp: 3 mL, Rfl: 0    vitamin D (ERGOCALCIFEROL) 1.25 MG (65844 UT) capsule capsule, TAKE 1 CAPSULE BY MOUTH WEEKLY, Disp: 4 capsule, Rfl: 3    Drug Interactions  None     Relevant Laboratory Values  Lab Results   Component Value Date    CHOL 231 (H) 02/27/2024    CHLPL 199 08/15/2014    TRIG 130 02/27/2024    HDL 67 (H) 02/27/2024     (H) 02/27/2024     Vaccinations:   Patient recommended to keep up with routine vaccinations.     Goals of Therapy  Clinical Goals or Therapeutic Targets: 10% weight loss goal      Date 5/2/22 6/2/22 6/30/22 7/28/22 8/25/55 9/22/22   Weight (kg) 91.4 kg  201.08lb 190.6lb 185.4lb 180lb 181lb 175.6lb   BMI kg/ 34.6 32.71 31.8 30.9 31 30   Waist Circumference (in)  42 in 41.5in 40in 40in 39.75in 39.75     Date 10/20/22 11/17/22 12/15/22 1/12/23 2/9/23 3/9/23 4/6/23 5/4/23 6/1/23 6/29/23   Weight (kg) 170 lbs 170.6 lbs 165.8lbs 164.6 lbs 162.6 lb 162.4 lb 163 lb 162 lb 164 lbs 162 lb   BMI kg/ 29.18 29.28 28.46 28.25 27.91 27.88 27.98 27.81 28.14 27.79   Waist Circumference (in)  38 in 37 in 36.5 in 37.75\" 36\" 37.5\" 36\" 35.5\" 37\" 36\"     Date 7/26/23 8/23/23 9/21/23 10/17/23 12/4/23 12/28/23 1/25/24 2/12/24 03/21/24   Weight (kg) 165.4 166 lb " "166.2 lb 166.8 lbs 175.2 lb 177.6 lb 176.2 lb 179 lb 178.4   BMI kg/ 28.37 28.48 28.51 28.61 30.05 30.43 30.23  30.95   Waist Circumference (in)  37.5 in 36\" 39\" 38.5\" 38\" 39.5\" 41\" 40\" 39.75\"       Medication Assessment & Plan    Patient's current BMI is 30.71, which is considered overweight. Patient has lost 22.68 lbs overall. Congratulated her on her success!   Patient has been maintaining weight/slight weight gain.     Will re-order Wegovy 2.4 SC weekly.     The following medications will need dose adjustments/closer monitoring once the GLP1 is started: levothyroxine     Most recent lab work from February. Thyroid and A1C WNL. CMP was unremarkable. Lipid panel showed an elevated LDL at 141.     Discussed lifestyle modifications, including diet and exercise.  Patient education provided.     Worked with patient to create SMART Goal(s): Patient would like to continue these goals  1. Drink 80 oz of water everyday.   2. Continue to Achieve/Track 10,000 steps every day via her watch.  3. Begin weight bearing exercise at least 2 days/week.   4. Meal Plan Dinner 2 times a week. .    Will follow-up in 4 weeks.     Linda Reyes, Pharmacy Intern  3/21/2024  09:07 EDT  "

## 2024-03-23 ENCOUNTER — E-VISIT (OUTPATIENT)
Dept: FAMILY MEDICINE CLINIC | Facility: TELEHEALTH | Age: 54
End: 2024-03-23

## 2024-03-23 NOTE — E-VISIT TREATED
Chief Complaint: Cold, flu, COVID, sinus, hay fever, or seasonal allergies   Patient introduction   Patient is 53-year-old female with cough, congestion, nasal discharge, itchy nose or sneezing, and voice hoarseness that started 3 to 5 days ago. Regarding date of symptom onset, patient writes: 03/20/24.   COVID-19 testing history, vaccination status, and exposure:    Patient was tested for COVID-19 within the last week. Test result was negative.    Patient was prompted to take a self-test during the interview, but does not have a COVID-19 test kit.    Has not received an updated [id number] COVID-19 vaccination (Pfizer-BioNTech or Moderna vaccine after September 12, 2023; or Novavax vaccine after October 3, 2023).    No known exposure to a person with a confirmed or suspected case of COVID-19.    No high-risk (household) exposure to COVID-19 within the last 14 days.   Risk factors for severe disease from COVID-19 infection    Age 50 or older.    Higher risk for severe disease from COVID-19 because of BMI >= 25.   General presentation   Symptoms came on suddenly.   Fever:    No fever.   Sinus and nasal symptoms:    Nasal discharge.    Itchy nose or sneezing.    White nasal drainage.    Nasal drainage is thick.    Postnasal drip.    Sinus pain or pressure on or around the forehead, eyes, and cheeks.    Patient first noticed sinus pain less than 5 days ago.    Sinus pain is worse with Valsalva.    No history of unhealed nasal septal ulcer/nasal wound.    No history of antibiotic treatment for sinus infection in the last year.    No history of deviated septum or nasal polyps.   Throat symptoms:    Voice is mildly hoarse. Patient does not believe hoarseness is due to voice strain.    No sore throat.   Head and body aches:    No headache.    No sweats.    No chills.    No myalgia.    No fatigue.   Cough:    Cough is worse during the day.    Cough is mildly productive of sputum.    Describes color of sputum as yellow.    "Wheezing and shortness of breath:    No COPD diagnosis.    No asthma diagnosis.    No wheezing.    No shortness of breath.    No previous albuterol inhaler use for URIs, bronchitis, or pneumonia.    No previous steroid inhaler use for URIs, bronchitis, or pneumonia.   Chest pain:    No chest pain.   Ear symptoms:    None.   Dizziness:    No dizziness.   Allergies:    No history of allergies.   Flu exposure:    No recent known exposure to a person with a confirmed flu diagnosis.    Received a flu vaccine in the last 1 to 3 months.   Patient is taking over-the-counter medications for current symptoms, including loratadine.   Review of red flags/alarm symptoms:    No changes in alertness or awareness.    No paroxysmal cough followed by whoop on inspiration    No decreased urination.    No blue or gray coloring present in face, lips, or nail beds.    No swelling, pain, redness, or increased warmth in the calf or lower part of ONE leg only.    No proptosis.   Pregnancy/menstrual status/breastfeeding:    Patient is menopausal.   Self-exam:    Height: 5' 4\"    Weight: 174 lbs    Neck lymph nodes feel normal.   Recent antibiotic use:    Has not taken antibiotics for similar symptoms within the past month.   Current medications   Currently taking Wegovy 2.4 MG/0.75ML solution auto-injector, Omega-3 Fish Oil 1000 MG capsule, levothyroxine 50 MCG tablet, ondansetron ODT 4 MG disintegrating tablet, vitamin D 1.25 MG (53803 UT) capsule capsule, and Loratadine 10 MG capsule.   Medication allergies    Penicillins (reaction: raised, red, itchy spots with each spot lasting less than 24 hours)   Medication contraindication review   No history of anaphylactic reaction to beta-lactam antibiotics; aspirin triad; blood dyscrasia; bone marrow depression; catecholamine-releasing paraganglioma; coronary artery disease; coagulation disorder; congenital long QT syndrome; depression; electrolyte abnormalities; fungal infection; GI bleeding; GI " obstruction; G6PD deficiency; heart arrhythmia; hypertension; mononucleosis; myasthenia; recent myocardial infarction; NSAID-induced asthma/urticaria; Parkinson's disease; pheochromocytoma; porphyria; Reye syndrome; seizure disorder; ulcerative colitis; and urinary retention.   No history of metoclopramide-associated dystonic reaction and tardive dyskinesia.   No known history of azithromycin-associated cholestatic jaundice or hepatic impairment.   Past medical history   Immune conditions:   No immunocompromising conditions.   No history of cancer.   Social history   Never smoked tobacco.   Patient did not request an excuse note.   Assessment   Allergic rhinitis.   This is the likely diagnosis based on patient's interview responses, including:    Symptom profile   Plan   Medications:    prednisone 20 mg tablet RX 20mg 1 tab PO qam 5d for allergies. Amount is 5 tab.    fluticasone 50 mcg/actuation nasal spray,suspension OTC 50mcg 2 sprays each nostril nasal qd 30d for nasal symptoms due to allergies or sinusitis. Fluticasone needs to be used every day to be effective. It may take up to a week for the full effects of the medication to be seen. Brands to look for include Flonase. Amount is 16 g.    fexofenadine 180 mg tablet OTC 180mg 1 tab PO qd PRN 30d for allergies or cough. Brands to look for include Allegra. Amount is 30 tab.   The patient's prescription will be sent to:   [id number]   [address] [facility] [id number]   Phone: [phone]     Fax: [phone]   Patient informed to purchase OTC medications.   Education:    Condition and causes    Prevention    Treatment and self-care    When to call provider   ----------   Electronically signed by PAVAN Amador on 2024-03-23 at 14:17PM   ----------   Patient Interview Transcript:   Which of these symptoms are bothering you? Select all that apply.    Cough    Stuffed-up nose or sinuses    Runny nose    Itchy nose or  sneezing    Hoarse voice or loss of voice   Not selected:    Shortness of breath    Itchy or watery eyes    Loss of smell or taste    Sore throat    Headache    Fever    Sweats    Chills    Muscle or body aches    Fatigue or tiredness    Nausea or vomiting    Diarrhea    I don't have any of these symptoms   When did your current symptoms start? Select one.    3 to 5 days ago   Not selected:    Less than 48 hours ago    6 to 9 days ago    10 to 14 days ago    2 to 3 weeks ago    3 to 4 weeks ago    More than a month ago   Do you know the exact date your symptoms started? If so, enter the date as MM/DD/YY. Select one.    Yes (specify): 03/20/24   Not selected:    No   Did your symptoms come on suddenly or gradually? Select one.    Suddenly   Not selected:    Gradually    I'm not sure   Since your current symptoms started, have you been tested for COVID-19? This includes home self-tests as well as nose swab or saliva tests done at a doctor's office, lab, or testing site. Select one.    Yes   Not selected:    No   When was your most recent COVID-19 test? Select one.    Within the last week (specify date as MM/DD/YY): patient did not specify   Not selected:    Within the last 24 hours    7 to 14 days ago    15 to 30 days ago    More than 1 month ago   What was the result of your most recent COVID-19 test? Select one.    Negative   Not selected:    Positive   Even though your last test was negative, you could still have COVID. You may have tested before the virus was detectable. In some cases, repeat testing over several days is needed. - If you have a COVID test kit, take the test now before continuing this interview. - If you choose not to take a test or don't have one, you should still continue this interview. Your provider can still help you get care. Do you have a COVID test kit? Select one.    No, I don't have a test kit   Not selected:    Yes, and I'll take another test now    Yes, but I prefer not to take a test  "now   Has anyone in your household tested positive for COVID-19 in the past 14 days? Select one.    No   Not selected:    Yes   In the last 14 days, have you had close contact with someone who has COVID-19? \"Close contact\" means any of these: - Caring for someone with COVID-19. - Being within 6 feet of someone with COVID-19 for a total of at least 15 minutes over a 24-hour period. For example, three 5-minute exposures for a total of 15 minutes. - Being in direct contact with respiratory droplets from someone with COVID-19 (being coughed on, kissing, sharing utensils). Select one.    No, not that I know of   Not selected:    Yes, a confirmed case    Yes, a suspected case   Have you gotten the [id number] updated COVID-19 vaccine? This means either the updated Pfizer-BioNTech or Moderna vaccine after September 12, 2023; or the updated Novavax vaccine after October 3, 2023. Select one.    No   Not selected:    Yes   Since your symptoms started, have you felt dizzy? Select one.    No   Not selected:    Yes, but I can still do my regular daily activities    Yes, and it makes it hard to stand, walk, or do daily activities   Do you have chest pain? You might also feel it as discomfort, aching, tightness, or squeezing in the chest. Select one.    No   Not selected:    Yes   Do you cough so hard that it's made you gag or vomit? By gag, we mean has your coughing made you choke or dry heave? Select all that apply.    Yes, my coughing has made me gag   Not selected:    Yes, my coughing has made me vomit    No   Does your cough come in spasms of 10 to 20 coughs in a row, followed by a loud whoop when breathing in? Select one.    No   Not selected:    Yes   When is your cough the worst? Select all that apply.    During the day   Not selected:    In the morning, or when I wake up    At nighttime, or while I'm sleeping    I haven't noticed a difference depending on time of day   Are you coughing up mucus or phlegm? Select one.   " " Yes, a little   Not selected:    No, my cough is dry    Yes, a lot   What color is most of the mucus or phlegm that you're coughing up? Select one.    Yellow or yellowish   Not selected:    Clear    White/frothy    Green or greenish    Red or pink    I'm not sure   Do you feel sinus pain or pressure in any of these areas?    In my forehead    Around my eyes    In my cheeks   Not selected:    Behind my nose    In my upper teeth or jaw    No   When did you first notice your sinus pain or pressure? Select one.    Less than 5 days ago   Not selected:    5 to 9 days ago    10 to 14 days ago    2 to 4 weeks ago    1 month ago or longer   Does coughing, sneezing, or leaning forward make your sinuses feel worse? Select one.    Yes   Not selected:    No   What color is your nasal drainage? Select one.    White   Not selected:    Clear    Yellow or yellowish    Green or greenish    My nose is stuffed but not draining or running   Is your nasal drainage thick or thin? Select one.    Thick   Not selected:    Thin   Is there any drainage (mucus) going down the back of your throat? This kind of drainage is also called \"postnasal drip.\" It can cause frequent throat clearing. Select one.    Yes   Not selected:    No, not that I know of   Have you urinated at least 3 times in the last 24 hours? Select one.    Yes   Not selected:    No   Do your face, lips, or nail beds appear blue or gray? Select one.    No   Not selected:    Yes   Do you have any swelling, pain, redness, or increased warmth in the calf or lower part of ONE leg only? Select one.    No   Not selected:    Yes   Changes in alertness or awareness may mean you need emergency care. Since your symptoms started, have you had any of these? Select all that apply.    None of the above   Not selected:    Confusion    Slurred speech    Not knowing where you are or what day it is    Difficulty staying conscious    Fainting or passing out   Do your symptoms include a whistling " sound, or wheezing, when you breathe? Select one.    No   Not selected:    Yes   Early in this interview, you told us you were hoarse or you'd lost your voice. How would you describe the changes to your voice? Select one.    It just sounds a little raspy   Not selected:    It's harder than usual to talk    I can barely talk at all   Is it possible that you strained your voice? Singing, yelling, or talking more or louder than usual can cause voice strain. Select one.    No, not that I know of   Not selected:    Yes   Since your symptoms started, have you noticed that one or both of your eyes is bulging or poking out? Select one.    No   Not selected:    Yes   Do you have any of these symptoms in your ear(s)? Select all that apply.    None of the above   Not selected:    Pain    Pressure    Fullness    Crackling or popping    Plugged or blocked sensation   Are your glands/lymph nodes swollen, or does it hurt when you touch them?    No, not that I can tell   Not selected:    Yes   In the past week, has anyone around you (such as at school, work, or home) had a confirmed diagnosis of the flu? A confirmed diagnosis means that a nose swab was done to verify a flu infection. Select all that apply.    No, not that I know of   Not selected:    I live with someone who has the flu    I've been within touching distance of someone who has the flu    I've walked by, or sat about 3 feet away from, someone who has the flu    I've been in the same building as someone who has the flu   Have you ever been diagnosed with asthma? Select one.    No   Not selected:    Yes   Have you ever been prescribed albuterol to use for wheezing, cough, or shortness of breath caused by a cold, bronchitis, or pneumonia? Albuterol (ProAir, Proventil, Ventolin) is prescribed as an inhaler or a solution to be used with a nebulizer machine. Select one.    No, not that I know of   Not selected:    Yes   Have you ever been prescribed a steroid inhaler to use  for wheezing, cough, or shortness of breath caused by a cold, bronchitis, or pneumonia? Some examples of steroid inhalers include Pulmicort, Flovent, Qvar, and Alvesco. Select one.    No, not that I know of   Not selected:    Yes   Have you ever been diagnosed with chronic obstructive pulmonary disease (COPD)? Select one.    No, not that I know of   Not selected:    Yes   In the past month, have you taken antibiotics for similar symptoms? Examples of antibiotics include amoxicillin, amoxicillin-clavulanate (Augmentin), penicillin, cefdinir (Omnicef), doxycycline, and clindamycin (Cleocin). Select one.    No   Not selected:    Yes (specify)   In the last year, how many times were you treated with antibiotics for a sinus infection? Select one.    None   Not selected:    1 to 3 times    4 or more times   Do any of these apply to you? Select all that apply.    None of the above   Not selected:    I've been hospitalized within the last 5 days    I have diabetes    I'm in close contact with a child in    Have you been diagnosed with a deviated septum or nasal polyps? The nose is divided into two nostrils by the septum. A crooked septum is called a deviated septum. Nasal polyps are growths inside the nose or sinuses. Select one.    No, not that I know of   Not selected:    Yes, but I had surgery to treat them    Yes, I have a deviated septum    Yes, I have nasal polyps    Yes, I have a deviated septum and nasal polyps   Do you have a sore inside your nose that won't heal? Select one.    No, not that I know of   Not selected:    Yes   Do you have allergies (pollen, dust mites, mold, animal dander)? Select one.    No, not that I know of   Not selected:    Yes   Have you had a flu shot this season? Select one.    Yes, 1 to 3 months ago   Not selected:    Yes, less than 2 weeks ago    Yes, 2 to 4 weeks ago    Yes, 3 to 6 months ago    Yes, more than 6 months ago    No   Have you gone through menopause? Select one.     I'm going through it now   Not selected:    Yes    No   The flu and COVID-19 can be more serious for people in certain groups. The next few questions help us figure out if you or anyone you live with is at higher risk for complications from these infections. Do any of these statements apply to you? Select all that apply.    None of the above   Not selected:    I'm     I'm     I'm Black    I'm  or    Do you smoke tobacco? Select one.    No   Not selected:    Yes, every day    Yes, some days    No, I quit   Do you have a mostly inactive lifestyle? Answer yes if all of these are true: - You spend at least 6 hours a day sitting or lying down - You get less than 2 and a half hours per week of moderate exercise such as walking fast - You get less than 1 hour and 15 minutes per week of intense exercise such as jogging or running Select one.    No   Not selected:    Yes   Do you have any of these conditions? Select all that apply.    None of the above   Not selected:    Chronic lung disease, such as cystic fibrosis or interstitial fibrosis    Heart disease, such as congenital heart disease, congestive heart failure, or coronary artery disease    Disorder of the brain, spinal cord, or nerves and muscles, such as dementia, cerebral palsy, epilepsy, muscular dystrophy, or developmental delay    Metabolic disorder or mitochondrial disease    Cerebrovascular disease, such as stroke or another condition affecting the blood vessels or blood supply to the brain    Down syndrome    Mood disorder, including depression or schizophrenia spectrum disorders    Substance use disorder, such as alcohol, opioid, or cocaine use disorder    Tuberculosis    Primary immunodeficiency   Do you live in a group care setting? Examples include: - Nursing home - Residential care - Psychiatric treatment facility - [facility] - Homeless shelter - Foster care setting Select  one.    No   Not selected:    Yes   Are you a healthcare worker? Select one.    No   Not selected:    Yes   People with a very high body mass index (BMI) are at higher risk for developing complications from the flu and severe illness from COVID-19. To determine your BMI, we need to know your weight and height. Please enter your weight (in pounds).    Weight   Please enter your height.    Height   Do you have any of these conditions that can affect the immune system? Scroll to see all options. Select all that apply.    None of these   Not selected:    History of bone marrow transplant    Chronic kidney disease    Chronic liver disease (including cirrhosis)    HIV/AIDS    Inflammatory bowel disease (Crohn's disease or ulcerative colitis)    Lupus    Moderate to severe plaque psoriasis    Multiple sclerosis    Rheumatoid arthritis    Sickle cell anemia    Alpha or beta thalassemia    History of kidney, liver, heart, or other solid organ transplant    History of liver, heart, or other solid organ transplant    History of spleen removal    An autoimmune disorder not listed here (specify)    A condition requiring treatment with long-term use of oral steroids (such as prednisone, prednisolone, or dexamethasone) (specify)   Have you ever been diagnosed with cancer? Select one.    No   Not selected:    Yes, I have cancer now    Yes, but I'm in remission   The flu and COVID-19 can be more serious for people in certain groups. Do any of these apply to the people who live with you? Select all that apply.    None of the above   Not selected:    Under age 5    Over age 65            Black     or     Pregnant    Has given birth, had a miscarriage, had a pregnancy loss, or had an  in the last 2 weeks   Does any member of your household have any of these medical conditions? Select all that apply.    None of the above   Not selected:    Asthma    Disorders of the brain, spinal cord,  or nerves and muscles, such as dementia, cerebral palsy, epilepsy, muscular dystrophy, or developmental delay    Chronic lung disease, such as COPD or cystic fibrosis    Heart disease, such as congenital heart disease, congestive heart failure, or coronary artery disease    Cerebrovascular disease, such as stroke or another condition affecting the blood vessels or blood supply to the brain    Blood disorders, such as sickle cell disease    Diabetes    Metabolic disorders such as inherited metabolic disorders or mitochondrial disease    Kidney disorders    Liver disorders    Weakened immune system due to illness or medications such as chemotherapy or steroids    Children under the age of 19 who are on long-term aspirin therapy    Extreme obesity (BMI > 40)   Do you have any of these conditions? Scroll to see all options. Select all that apply.    None of the above   Not selected:    Aspirin triad (also known as Samter's triad or ASA triad)    Asthma or hives from taking aspirin or other NSAIDs, such as ibuprofen or naproxen    Blockage or narrowing of the blood vessels of the heart    Blood clotting disorder    Blood dyscrasia, such anemia, leukemia, lymphoma, or myeloma    Bone marrow depression    Catecholamine-releasing paraganglioma    Congenital long QT syndrome    Depression    Difficulty urinating or completely emptying your bladder    Uncorrected electrolyte abnormalities    Fungal infection    Gastrointestinal (GI) bleeding    Gastrointestinal (GI) obstruction    G6PD deficiency    Recent heart attack    High blood pressure    Irregular heartbeat or heart rhythm    Mononucleosis (mono)    Myasthenia gravis    Parkinson's disease    Pheochromocytoma    Reye syndrome    Seizure disorder    Thyroid disease    Ulcerative colitis   Have you ever had either of these conditions? Select all that apply.    No   Not selected:    Metoclopramide-associated dystonic reaction    Tardive dyskinesia   Just a few more  questions about medications, and then you're finished. Have you used any non-prescription medications or nasal sprays for your current symptoms? Examples include saline sprays, decongestants, NyQuil, and Tylenol. Select one.    Yes   Not selected:    No   Which of these non-prescription medications have you tried? Scroll to see all options. Select all that apply.    Loratadine (Alavert, Claritin)   Not selected:    Acetaminophen (Tylenol)    Budesonide (Rhinocort)    Cetirizine (Zyrtec)    Chlorpheniramine (Aller-chlor, Chlor-Trimeton)    Cromolyn (NasalCrom)    Dextromethorphan (Delsym, Robitussin, Vicks DayQuil Cough)    Diphenhydramine (Benadryl)    Fexofenadine (Allegra)    Fluticasone (Flonase)    Guaifenesin (Mucinex)    Guaifenesin/dextromethorphan (Delsym DM, Mucinex DM, Robitussin DM)    Ibuprofen (Advil, Motrin, Midol)    Ketotifen (Alaway, Zaditor)    Naphazoline-pheniramine (Naphcon-A, Opcon-A, Visine-A)    Omeprazole (Prilosec)    Oxymetazoline (Afrin)    Phenylephrine (Sudafed PE)    Triamcinolone (Nasacort)    None of the above   Have you taken any monoamine oxidase inhibitor (MAOI) medications in the last 14 days? Examples include rasagiline (Azilect), selegiline (Eldepryl, Zelapar), isocarboxazid (Marplan), phenelzine (Nardil), and tranylcypromine (Parnate). Select one.    No, not that I know of   Not selected:    Yes   Do you take Kynmobi or Apokyn (apomorphine)? Select one.    No   Not selected:    Yes   Are you still taking these medications listed in your medical record? If you're not taking any of these, click Next. Select all that apply.    Wegovy 2.4 MG/0.75ML solution auto-injector    Omega-3 Fish Oil 1000 MG capsule    levothyroxine 50 MCG tablet    ondansetron ODT 4 MG disintegrating tablet    vitamin D 1.25 MG (52719 UT) capsule capsule    Loratadine 10 MG capsule   Are you taking any other medications, vitamins, or supplements? Select one.    No   Not selected:    Yes   Have you ever had  "an allergic or bad reaction to any medication? Select one.    Yes   Not selected:    No   Have you had an allergic or bad reaction to any of these medications? Select all that apply.    No, not that I know of   Not selected:    Baloxavir (Xofluza)    Benzonatate (Tessalon Perles)    Fluconazole, itraconazole, or terconazole (brands include Diflucan, Sporanox, Terazol)    Oseltamivir (Tamiflu) or zanamivir (Relenza)    Paxlovid, nirmatrelvir, or ritonavir (Norvir)   Have you had an allergic or bad reaction to any of these antibiotic medications? Select all that apply.    Penicillin or any \"-cillin\" antibiotic, such as amoxicillin, ampicillin, dicloxacillin, nafcillin, or piperacillin (Brands include Augmentin, Unasyn, and Zosyn)   Not selected:    Tetracycline or any \"-cycline\" antibiotic, such as doxycycline, demeclocycline, minocycline (Brands include Declomycin, Doryx, Dynacin, Oracea, Monodox, Panmycin, and Vibramycin)    Ciprofloxacin or any \"-floxacin\" antibiotic, such as gemifloxacin, levofloxacin, moxifloxacin, or ofloxacin (Brands include Factive, Cipro, Floxin, and Levaquin)    Cephalexin or any \"cef-\" antibiotic, such as cefazolin, cefdinir, cefuroxime, ceftriaxone, ceftazidime, or cefepime (Brands include Ancef, Ceftin, Fortaz, Keflex, Maxipime, Rocephin, and Simplicef)    Azithromycin or any \"-thromycin\" antibiotic, such as erythromycin or clarithromycin (Brands include Biaxin, Erythrocin, Z-shilpa, and Zithromax)    Clindamycin or lincomycin (Brands include Cleocin and Lincocin)    No, not that I know of   When you had an allergic or bad reaction to penicillin or another \"-cillin\" antibiotic, did you have any of these symptoms? Select all that apply.    Raised, red, itchy spots with each spot lasting less than 24 hours   Not selected:    Swelling of the mouth, eyes, lips, or tongue    Blisters or ulcers on the lips, mouth, eyes, or vagina    _Peeling skin _    Breathing difficulties    Feeling light-headed " or faint    A fast heartbeat    Clammy skin    Confusion and anxiety    Collapsing or losing consciousness    Joint pains    Problems with kidneys, lungs, or liver    None of the above   Have you had an allergic or bad reaction to any of these medications? Select all that apply.    No, not that I know of   Not selected:    Albuterol or a similar medication    Atropine    Corticosteroid (steroid) medication, including topical steroids, inhaled steroids, nasal steroids, or oral steroids (budesonide, ciclesonide, dexamethasone, flunisolide, fluticasone, methylprednisolone, triamcinolone, prednisone (or brand names Alvesco, Deltasone, Flovent, Medrol, Nasacort, Rhinocort, or Veramyst)    Metoclopramide (Reglan)    Ondansetron (Zuplenz, Zofran ODT, Zofran)    Prochlorperazine (Compazine)   Have you had an allergic or bad reaction to any of these eye drops, nasal sprays, or inhalers? Scroll to see all options. Select all that apply.    No, not that I know of   Not selected:    Azelastine (Astelin, Astepro, Optivar)    Cromolyn (Crolom, NasalCrom)    Ipratropium (Atrovent)    Ketotifen (Alaway, Zaditor)    Pheniramine/naphazoline (Naphcon-A, Opcon-A, Visine-A)    Olopatadine (Pataday, Patanol, Pazeo)   Have you had an allergic or bad reaction to any of these non-prescription medications? Scroll to see all options. Select all that apply.    No, not that I know of   Not selected:    Acetaminophen (Tylenol)    Aspirin    Cetirizine (Zyrtec)    Dextromethorphan (Delsym, Robitussin, Vicks DayQuil Cough)    Diphenhydramine (Benadryl)    Fexofenadine (Allegra)    Guaifenesin (Mucinex)    Dextromethorphan (Delsym)    Ibuprofen (Advil, Motrin, Midol)    Loratadine (Alavert, Claritin)    Oxymetazoline (Afrin)    Phenylephrine (Sudafed PE)    Pseudoephedrine (Sudafed)   Are you allergic to milk or to the proteins found in milk (for example, whey or casein)? A milk allergy is different from lactose intolerance. Select one.    No, not  that I know of   Not selected:    Yes   Have you ever had jaundice or liver problems as a result of taking azithromycin (Zithromax, Zmax)? Jaundice is a condition in which the skin and the whites of the eyes turn yellow. Select all that apply.    No, not that I know of   Not selected:    Yes, jaundice    Yes, liver problems   Do you need a doctor's note? A doctor's note confirms that you received care today and states when you can return to school or work. It does not contain information about your diagnosis or treatment plan. Your provider will make the final decision on whether to give you a doctor's note and for how long. Doctor's notes CANNOT be backdated. We can't provide medical leave paperwork through this type of visit. If more paperwork is needed to request time off, contact your primary care provider. Select one.    No   Not selected:    Today only (1 day)    Today and tomorrow (2 days)    3 days    5 days    7 days   Is there anything you'd like to add about your symptoms? Please limit your comments to the symptoms covered in this interview. If you include comments about other concerns, your provider may recommend that you be seen in person.   The patient did not enter any additional information.   ----------   Medical history   The following information was received from the EMR on March 23, 2024.   Allergies:    PENICILLINS   - Allergy Type: Medication   - Reaction: Hives   - Severity: None   - Clinical Status: Active   - Verification Status: Confirmed   Medications:    WEGOVY 2.4 MG/0.75ML SC SOAJ   - Route: Subcutaneous   - Start Date: March 21, 2024   - End Date: None   - Status: Active    fish oil capsule 1000 mg   - Route: Oral   - Start Date: August 15, 2023   - End Date: None   - Status: Active    levothyroxine (SYNTHROID) tablet   - Route: Oral   - Start Date: January 29, 2024   - End Date: None   - Status: Active    ondansetron (ZOFRAN-ODT) disintegrating tablet   - Route: Translingual    - Start Date: October 26, 2022   - End Date: None   - Status: Active    vitamin D (ERGOCALCIFEROL) capsule 25534 units   - Route:   - Start Date: February 12, 2024   - End Date: None   - Status: Active    LORATADINE 10 MG PO CAPS   - Route: Oral   - Start Date: May 02, 2022   - End Date: None   - Status: Active   Problem list:    Acute appendicitis   - Category: Problem List Item   - Health Status:   - Start Date: October 02, 2018   - End Date: January 07, 2019   - Status: Resolved    Status post umbilical hernia repair, follow-up exam   - Category: Problem List Item   - Health Status:   - Start Date: January 07, 2019   - End Date: None   - Status: Active    Vitamin D deficiency   - Category: Problem List Item   - Health Status:   - Start Date: October 07, 2021   - End Date: None   - Status: Active    Stress incontinence in female   - Category: Problem List Item   - Health Status:   - Start Date: October 07, 2021   - End Date: None   - Status: Active    Fatigue   - Category: Problem List Item   - Health Status:   - Start Date: October 07, 2021   - End Date: None   - Status: Active    Hypertensive disorder   - Category: Problem List Item   - Health Status:   - Start Date: October 07, 2021   - End Date: None   - Status: Active    Increased frequency of urination   - Category: Problem List Item   - Health Status:   - Start Date: October 07, 2021   - End Date: None   - Status: Active    Body mass index (BMI) 32.0-32.9, adult   - Category: Problem List Item   - Health Status:   - Start Date: December 06, 2021   - End Date: November 02, 2022   - Status: Resolved    Hypothyroidism   - Category: Problem List Item   - Health Status:   - Start Date: December 06, 2021   - End Date: None   - Status: Active    Decreased GFR   - Category: Problem List Item   - Health Status:   - Start Date: November 02, 2022   - End Date: None   - Status: Active    Dyslipidemia   - Category: Problem List Item   - Health Status:   - Start Date:  November 02, 2022   - End Date: None   - Status: Active    Overweight with body mass index (BMI) of 29 to 29.9 in adult   - Category: Problem List Item   - Health Status:   - Start Date: November 02, 2022   - End Date: None   - Status: Active

## 2024-03-23 NOTE — EXTERNAL PATIENT INSTRUCTIONS
Diagnosis   Allergic rhinitis (allergies)   My name is AnnamariePAVAN Mccormick, and I'm a healthcare provider at [facility]. I reviewed your interview and I see that you have allergic rhinitis, also known as seasonal allergies or hay fever. Allergic rhinitis is not an infection. It isn't caused by a virus or bacteria. Although allergy symptoms can be unpleasant, your symptoms aren't part of a more serious condition.   In your interview, you mentioned that you don't think your symptoms are allergy-related. However, based on your responses, I believe this is the right diagnosis for you. If you still aren't feeling better after following this treatment plan, contact us to set up an appointment.   Based on what you told me in your interview, I haven't prescribed any antibiotics. Antibiotics won't help with allergies. Your symptoms suggest you have allergies, not an infection:    An itchy nose or sneezing.    You haven't had symptoms long enough to suggest a bacterial infection.    You don't have a fever. Viral and bacterial infections, but not allergies, can lead to a fever.    You don't have a sore throat. Bacterial infections like strep throat cause a severe sore throat.   Medications   Your pharmacy   [id number] [phone]     Prescription   Prednisone (20mg): Take 1 tablet by mouth every morning for 5 days for allergies.   Non-prescription   Fluticasone (50mcg): Spray 2 times in each nostril once daily for nasal symptoms due to allergies or sinusitis. Fluticasone needs to be used every day to be effective. It may take up to a week for the full effects of the medication to be seen. Brands to look for include Flonase.   Fexofenadine (180mg): Take 1 tablet by mouth daily as needed for allergies or cough. Brands to look for include Allegra.   About your diagnosis   Allergic rhinitis, also called allergies or hay fever, is very common. Symptoms can  seem similar to a cold, but they're caused by an allergen, not a virus. These are the key things to know about allergies:    The best way to treat allergies is to avoid the cause of your symptoms. Medications can also help your symptoms.    Symptoms range in severity. They may be only mildly annoying or they may seriously affect day-to-day life.    Symptoms can be seasonal, triggered by tree pollen, grasses, and weeds. Mold, dust, or pet dander can cause allergy symptoms in any season.   In addition to symptoms affecting the nose and eyes, allergies can cause fatigue and irritability.   What to expect   Despite the unpleasant symptoms, you should feel better soon. Follow the treatment plan provided here.   When to seek care   Call us at [phone]   with any sudden or unexpected symptoms.    Severe allergy symptoms.    Your normal allergy medications stop working or cause uncomfortable side effects.    Your sinus pain continues for 10 days or more, without improvement.    Severe chest pain.   Other treatment   If possible, avoid the allergens that trigger your allergy symptoms. If you normally use medications, inhalers, or an asthma action plan, continue using them as prescribed.   Prevention   Allergy medications work best if you take them before your symptoms develop.   Avoid smoke and air pollution. Smoke can make infections worse.    Flu vaccine information   Who should get a flu vaccine?   Everyone 6 months of age and older should get a yearly flu vaccine.   When should I get vaccinated?   You should get a flu vaccine by the end of October. Once you're vaccinated, it takes about two weeks for antibodies to develop and protect you against the flu. That's why it's important to get vaccinated as soon as possible.   After October, is it too late to get vaccinated?   No. You should still get vaccinated. As long as the flu viruses are still in your community, flu vaccines will remain available, even into  January of next year or later.   Why do I need a flu vaccine EVERY year?   Flu viruses are constantly changing, so flu vaccines are usually updated from one season to the next. Your protection from the flu vaccine also lessens over time.   Is the flu vaccine safe?   Yes. Over the last 50 years, hundreds of millions of Americans have safely received the flu vaccines.   What are the side effects of flu vaccines?   You CANNOT get the flu from a flu vaccine. Common side effects of the flu shot include soreness, redness and/or swelling where the shot was given, low grade fever, and aches. Common side effects of the nasal spray flu vaccine for adults include runny nose, headaches, sore throat, and cough. For children, side effects include wheezing, vomiting, muscle aches, and fever.   Does the flu vaccine increase your risk of getting COVID-19?   No. There is no evidence that getting a flu vaccine increases your risk of getting COVID-19.   Is it safe to get the flu vaccine along with a COVID-19 vaccine?   Yes. It's safe to get the flu vaccine with a COVID-19 vaccine or booster.   Contact your healthcare provider TODAY for details on when and where to get your flu vaccine.    Coronavirus (COVID-19) information   Common symptoms of COVID-19 include fever, cough, shortness of breath, fatigue, muscle or body aches, headaches, new loss of sense of taste or smell, sore throat, stuffy or runny nose, nausea or vomiting, and diarrhea. Most people who get COVID-19 have mild symptoms and can rest at home until they get better. Elderly people and those with chronic medical problems may be at risk for more serious complications.   FAQs about the COVID-19 vaccine   Are the vaccines safe?   Yes. Hundreds of millions of people in the [place] have already safely received COVID-19 vaccines under the most intense safety monitoring in the history of the [place].   Do I need the vaccine if I've already had COVID?   Yes. Vaccination helps protect you  even if you've already had COVID.   If you had COVID-19 and had symptoms, wait to get vaccinated until you've recovered and completed your isolation period.   If you tested positive for COVID-19 but did not have symptoms, you can get vaccinated after 5 full days have passed since you had a positive test, as long as you don't develop symptoms.   How many doses of the vaccine do I need?   Visit www.cdc.gov/coronavirus/2019-ncov/vaccines/stay-up-to-date.html   to find out how to stay up to date with your COVID-19 vaccines.   I'm immunocompromised. How many doses of the vaccine do I need?   For information on how immunocompromised people can stay up to date with their COVID-19 vaccines, visit www.cdc.gov/coronavirus/2019-ncov/vaccines/recommendations/immuno.html  .   What are the common side effects of the vaccine?   A sore arm, tiredness, headache, and muscle pain may occur within two days of getting the vaccine and last a day or two. For the Moderna or Pfizer vaccines, side effects are more common after the second dose. People over the age of 55 are less likely to have side effects than younger people.   After I'm up to date on vaccines, can I still get or spread COVID?   Yes, you can still get COVID, but your disease should be milder. And your risk of serious illness, hospitalization, and complications will be much lower, especially if you're up to date. Unfortunately, you can still spread COVID if you've been vaccinated. That's why it's important to follow isolation guidelines if you get sick or test positive.   After I'm up to date on vaccines, can I go back to normal?   You should still wear a mask indoors in public if:    It's required by laws, rules, regulations, or local guidance.    You have a weakened immune system.    Your age puts you at increased risk of severe disease.    You have a medical condition that puts you at increased risk of severe disease.    Someone in your household has a weakened immune  system, is at increased risk for severe disease, or is unvaccinated.    You're in an area of high transmission.   Where can I get a COVID-19 vaccine?   Visit [facility]'s website for more information. To find a COVID-19 vaccination site near you, visit www.vaccines.gov/  , call [phone]  , or text your zip code to [id number] ([place]). Message and data rates may apply.   I've had close contact with someone who has COVID. Do I need to quarantine, and if so, for how long?   For the most current answer, including a calculator to determine whether you need to stay home and for how long, visit www.cdc.gov/coronavirus/2019-ncov/your-health/isolation.html  .   I've tested positive for COVID. How long do I need to isolate?   For the latest recommendations, including a calculator to determine how long you need to stay home, visit www.cdc.gov/coronavirus/2019-ncov/your-health/isolation.html  .   What if I develop symptoms that might be from COVID?   For the latest recommendations on what to do if you're sick, including when to seek emergency care, visit www.cdc.gov/coronavirus/2019-ncov/if-you-are-sick/index.html  .   Your provider   Your diagnosis was provided by PAVAN Amador, a member of your trusted care team at [facility].   If you have any questions, call us at [phone]  .

## 2024-04-18 ENCOUNTER — TELEPHONE (OUTPATIENT)
Dept: FAMILY MEDICINE CLINIC | Facility: CLINIC | Age: 54
End: 2024-04-18
Payer: COMMERCIAL

## 2024-04-18 ENCOUNTER — DISEASE STATE MANAGEMENT VISIT (OUTPATIENT)
Dept: PHARMACY | Facility: HOSPITAL | Age: 54
End: 2024-04-18
Payer: COMMERCIAL

## 2024-04-18 VITALS
DIASTOLIC BLOOD PRESSURE: 65 MMHG | WEIGHT: 179.4 LBS | HEIGHT: 64 IN | BODY MASS INDEX: 30.63 KG/M2 | SYSTOLIC BLOOD PRESSURE: 140 MMHG | HEART RATE: 66 BPM

## 2024-04-18 RX ORDER — SEMAGLUTIDE 2.4 MG/.75ML
2.4 INJECTION, SOLUTION SUBCUTANEOUS WEEKLY
Qty: 3 ML | Refills: 0 | Status: SHIPPED | OUTPATIENT
Start: 2024-04-18

## 2024-04-18 NOTE — PROGRESS NOTES
[facility]  Weight Management Program        Jaquan Carlson is a 53 y.o. 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  Jaquan Carlson has previously tried Saxenda, Adipex, Wegovy, Mounjaro and lifestyle changes for weight loss.  Current weight loss efforts includes Wegovy. The patient denies a personal history or family history of thyroid cancer and denies a personal history of pancreatitis. Denies hoarseness/swelling/lumps in throat or severe abdominal pain.     Patient is currently on Wegovy 2.4 mg weekly. Patient denies any lumps/swelling/hoarseness in neck/throat or abdominal pain. Patient reports tolerating medication well without any side effects.      At previous visit, patient reported that Nicole wanted her to start on fish oil first with her cholesterol and she is using her BP med PRN when BP gets high. BP in clinic was 150/74. Encouraged patient to continue to monitor BP and speak with her PCP about her BP medication.    Patient reports that she has been doing doing well with her step count goal, but states she struggles meeting her water goal on the weekends at home. Patient would like to continue targeting her current SMART goals. Patient is meeting her meal prep goal and wishes to continue this.       Relevant Past Medical History and Co-morbidities  Past Medical History:   Diagnosis Date    Diarrhea     Dyslipidemia 11/02/2022    Hypertensive disorder 10/07/2021    Hypothyroidism 12/06/2021    Umbilical hernia      Social History     Socioeconomic History    Marital status:    Tobacco Use    Smoking status: Never    Smokeless tobacco: Never   Vaping Use    Vaping status: Never Used   Substance and Sexual Activity    Alcohol use: No    Drug use: No    Sexual activity: Yes     Partners: Male     Birth control/protection: Surgical  "      Allergies  Penicillins    Current Medication List    Current Outpatient Medications:     levothyroxine (Synthroid) 50 MCG tablet, Take 1 tablet by mouth Daily., Disp: 90 tablet, Rfl: 0    Loratadine 10 MG capsule, Take 1 capsule by mouth Daily., Disp: , Rfl:     Omega-3 Fatty Acids (fish oil) 1000 MG capsule capsule, Take 1 capsule by mouth 2 (Two) Times a Day With Meals., Disp: 60 capsule, Rfl: 5    ondansetron ODT (ZOFRAN-ODT) 4 MG disintegrating tablet, Dissolve 1 tablet on the tongue Every 12 (Twelve) Hours As Needed for Nausea or Vomiting., Disp: 45 tablet, Rfl: 1    Semaglutide-Weight Management (Wegovy) 2.4 MG/0.75ML solution auto-injector, Inject 2.4 mg under the skin into the appropriate area as directed 1 (One) Time Per Week., Disp: 3 mL, Rfl: 0    vitamin D (ERGOCALCIFEROL) 1.25 MG (45796 UT) capsule capsule, TAKE 1 CAPSULE BY MOUTH WEEKLY, Disp: 4 capsule, Rfl: 3    Drug Interactions  None     Relevant Laboratory Values  Lab Results   Component Value Date    CHOL 231 (H) 02/27/2024    CHLPL 199 08/15/2014    TRIG 130 02/27/2024    HDL 67 (H) 02/27/2024     (H) 02/27/2024     Vaccinations:   Patient recommended to keep up with routine vaccinations.     Goals of Therapy  Clinical Goals or Therapeutic Targets: 10% weight loss goal      Date 5/2/22 6/2/22 6/30/22 7/28/22 8/25/55 9/22/22   Weight (kg) 91.4 kg  201.08lb 190.6lb 185.4lb 180lb 181lb 175.6lb   BMI kg/ 34.6 32.71 31.8 30.9 31 30   Waist Circumference (in)  42 in 41.5in 40in 40in 39.75in 39.75     Date 10/20/22 11/17/22 12/15/22 1/12/23 2/9/23 3/9/23 4/6/23 5/4/23 6/1/23 6/29/23   Weight (kg) 170 lbs 170.6 lbs 165.8lbs 164.6 lbs 162.6 lb 162.4 lb 163 lb 162 lb 164 lbs 162 lb   BMI kg/ 29.18 29.28 28.46 28.25 27.91 27.88 27.98 27.81 28.14 27.79   Waist Circumference (in)  38 in 37 in 36.5 in 37.75\" 36\" 37.5\" 36\" 35.5\" 37\" 36\"     Date 7/26/23 8/23/23 9/21/23 10/17/23 12/4/23 12/28/23 1/25/24 2/12/24 03/21/24 4/18/24   Weight (kg) 165.4 " "166 lb 166.2 lb 166.8 lbs 175.2 lb 177.6 lb 176.2 lb 179 lb 178.4 179.4 lb   BMI kg/ 28.37 28.48 28.51 28.61 30.05 30.43 30.23  30.95 30.77   Waist Circumference (in)  37.5 in 36\" 39\" 38.5\" 38\" 39.5\" 41\" 40\" 39.75\" 37\"       Medication Assessment & Plan    Patient's current BMI is 30.77, which is considered overweight. Patient has lost 21.68 lbs overall. Congratulated her on her success!   Patient has been maintaining weight/slight weight gain.     Will re-order Wegovy 2.4 SC weekly.     The following medications will need dose adjustments/closer monitoring once the GLP1 is started: levothyroxine      Discussed lifestyle modifications, including diet and exercise.  Patient education provided.     Worked with patient to create SMART Goal(s): Patient would like to continue these goals  1. Drink 80 oz of water everyday.   2. Continue to Achieve/Track 10,000 steps every day via her watch.  3. Begin weight bearing exercise at least 2 days/week.   4. Meal Plan Dinner 2 times a week. .    Will follow-up in 4 weeks.     Sandie Camilo, PharmD  4/18/2024  08:56 EDT  "

## 2024-04-18 NOTE — TELEPHONE ENCOUNTER
Caller: Jaquan Carlson    Relationship: Self    Best call back number: [phone]      What was the call regarding: PATIENT CALLED ASKING IF SHE COULD SWITCH FROM WEGOVY TO ZEPBOUND.  PATIENT STATED THAT SHE IS NOT LOSING WEIGHT ON THE MEDICATION.     [facility] [place]  P: [phone]  F: [phone]  Address    [address] [address]

## 2024-04-22 DIAGNOSIS — Z76.0 MEDICATION REFILL: ICD-10-CM

## 2024-04-22 DIAGNOSIS — E03.9 HYPOTHYROIDISM, UNSPECIFIED TYPE: ICD-10-CM

## 2024-04-29 RX ORDER — LEVOTHYROXINE SODIUM 0.05 MG/1
50 TABLET ORAL DAILY
Qty: 90 TABLET | Refills: 0 | Status: SHIPPED | OUTPATIENT
Start: 2024-04-29

## 2024-05-16 ENCOUNTER — DISEASE STATE MANAGEMENT VISIT (OUTPATIENT)
Dept: PHARMACY | Facility: HOSPITAL | Age: 54
End: 2024-05-16
Payer: COMMERCIAL

## 2024-05-16 VITALS
DIASTOLIC BLOOD PRESSURE: 77 MMHG | BODY MASS INDEX: 30.46 KG/M2 | HEART RATE: 86 BPM | HEIGHT: 64 IN | SYSTOLIC BLOOD PRESSURE: 144 MMHG | WEIGHT: 178.4 LBS

## 2024-05-16 RX ORDER — SEMAGLUTIDE 2.4 MG/.75ML
2.4 INJECTION, SOLUTION SUBCUTANEOUS WEEKLY
Qty: 3 ML | Refills: 0 | Status: SHIPPED | OUTPATIENT
Start: 2024-05-16

## 2024-05-16 NOTE — PROGRESS NOTES
[facility]  Weight Management Program        Jaquan Carlson is a 53 y.o. 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  Jaquan Carlson has previously tried Saxenda, Adipex, Wegovy, Mounjaro and lifestyle changes for weight loss.  Current weight loss efforts includes Wegovy. The patient denies a personal history or family history of thyroid cancer and denies a personal history of pancreatitis. Denies hoarseness/swelling/lumps in throat or severe abdominal pain.     Patient is currently on Wegovy 2.4 mg weekly. Patient denies any lumps/swelling/hoarseness in neck/throat or abdominal pain. Patient reports tolerating medication well without any side effects.      At previous visit, patient reported that Nicole wanted her to start on fish oil first with her cholesterol and she is using her BP med PRN when BP gets high. BP in clinic was 150/74. Encouraged patient to continue to monitor BP and speak with her PCP about her BP medication.    Patient reports that she has been doing doing well with her step count goal, but states she struggles meeting her water goal on the weekends at home. Patient would like to continue targeting her current SMART goals. Patient is meeting her meal prep goal and wishes to continue this. Water goal is going well. Hitting water goal during the week, but not on the weekends. Patient reports it makes her go to the bathroom frequently.      Relevant Past Medical History and Co-morbidities  Past Medical History:   Diagnosis Date    Diarrhea     Dyslipidemia 11/02/2022    Hypertensive disorder 10/07/2021    Hypothyroidism 12/06/2021    Umbilical hernia      Social History     Socioeconomic History    Marital status:    Tobacco Use    Smoking status: Never    Smokeless tobacco: Never   Vaping Use    Vaping status: Never Used   Substance  "and Sexual Activity    Alcohol use: No    Drug use: No    Sexual activity: Yes     Partners: Male     Birth control/protection: Surgical       Allergies  Penicillins    Current Medication List    Current Outpatient Medications:     levothyroxine (Synthroid) 50 MCG tablet, Take 1 tablet by mouth Daily., Disp: 90 tablet, Rfl: 0    Loratadine 10 MG capsule, Take 1 capsule by mouth Daily., Disp: , Rfl:     Omega-3 Fatty Acids (fish oil) 1000 MG capsule capsule, Take 1 capsule by mouth 2 (Two) Times a Day With Meals., Disp: 60 capsule, Rfl: 5    ondansetron ODT (ZOFRAN-ODT) 4 MG disintegrating tablet, Dissolve 1 tablet on the tongue Every 12 (Twelve) Hours As Needed for Nausea or Vomiting., Disp: 45 tablet, Rfl: 1    Semaglutide-Weight Management (Wegovy) 2.4 MG/0.75ML solution auto-injector, Inject 2.4 mg under the skin into the appropriate area as directed 1 (One) Time Per Week., Disp: 3 mL, Rfl: 0    vitamin D (ERGOCALCIFEROL) 1.25 MG (18818 UT) capsule capsule, TAKE 1 CAPSULE BY MOUTH WEEKLY, Disp: 4 capsule, Rfl: 3    Drug Interactions  None     Relevant Laboratory Values  Lab Results   Component Value Date    CHOL 231 (H) 02/27/2024    CHLPL 199 08/15/2014    TRIG 130 02/27/2024    HDL 67 (H) 02/27/2024     (H) 02/27/2024     Vaccinations:   Patient recommended to keep up with routine vaccinations.     Goals of Therapy  Clinical Goals or Therapeutic Targets: 10% weight loss goal      Date 5/2/22 6/2/22 6/30/22 7/28/22 8/25/55 9/22/22   Weight (kg) 91.4 kg  201.08lb 190.6lb 185.4lb 180lb 181lb 175.6lb   BMI kg/ 34.6 32.71 31.8 30.9 31 30   Waist Circumference (in)  42 in 41.5in 40in 40in 39.75in 39.75     Date 10/20/22 11/17/22 12/15/22 1/12/23 2/9/23 3/9/23 4/6/23 5/4/23 6/1/23 6/29/23   Weight (kg) 170 lbs 170.6 lbs 165.8lbs 164.6 lbs 162.6 lb 162.4 lb 163 lb 162 lb 164 lbs 162 lb   BMI kg/ 29.18 29.28 28.46 28.25 27.91 27.88 27.98 27.81 28.14 27.79   Waist Circumference (in)  38 in 37 in 36.5 in 37.75\" 36\" " "37.5\" 36\" 35.5\" 37\" 36\"     Date 7/26/23 8/23/23 9/21/23 10/17/23 12/4/23 12/28/23 1/25/24 2/12/24 03/21/24 4/18/24   Weight (kg) 165.4 166 lb 166.2 lb 166.8 lbs 175.2 lb 177.6 lb 176.2 lb 179 lb 178.4 179.4 lb   BMI kg/ 28.37 28.48 28.51 28.61 30.05 30.43 30.23  30.95 30.77   Waist Circumference (in)  37.5 in 36\" 39\" 38.5\" 38\" 39.5\" 41\" 40\" 39.75\" 37\"     Date 5/16/24     Weight (kg) 178.4 lb     BMI kg/ 30.60     Waist Circumference (in)  39.5\"         Medication Assessment & Plan    Patient's current BMI is 30.60, which is considered overweight. Patient has lost 22.68 lbs overall. Congratulated her on her success!   Patient has been maintaining weight/slight weight gain.     Will re-order Wegovy 2.4 SC weekly.     The following medications will need dose adjustments/closer monitoring once the GLP1 is started: levothyroxine      Discussed lifestyle modifications, including diet and exercise.  Patient education provided.     Worked with patient to create SMART Goal(s): Patient would like to continue these goals  1. Drink 80 oz of water everyday.   2. Continue to Achieve/Track 10,000 steps every day via her watch.  3. Begin weight bearing exercise at least 2 days/week.   4. Meal Plan Dinner 2 times a week. .    Will follow-up in 4 weeks.     Sandie Camilo, PharmD  5/16/2024  09:13 EDT  "

## 2024-06-03 ENCOUNTER — OFFICE VISIT (OUTPATIENT)
Dept: FAMILY MEDICINE CLINIC | Facility: CLINIC | Age: 54
End: 2024-06-03
Payer: COMMERCIAL

## 2024-06-03 VITALS
BODY MASS INDEX: 31.1 KG/M2 | HEART RATE: 88 BPM | SYSTOLIC BLOOD PRESSURE: 128 MMHG | OXYGEN SATURATION: 99 % | WEIGHT: 182.2 LBS | DIASTOLIC BLOOD PRESSURE: 80 MMHG | HEIGHT: 64 IN | TEMPERATURE: 97.7 F

## 2024-06-03 DIAGNOSIS — Z76.0 MEDICATION REFILL: ICD-10-CM

## 2024-06-03 DIAGNOSIS — E03.9 HYPOTHYROIDISM, UNSPECIFIED TYPE: ICD-10-CM

## 2024-06-03 DIAGNOSIS — R63.4 WEIGHT LOSS: Primary | ICD-10-CM

## 2024-06-03 DIAGNOSIS — E55.9 VITAMIN D DEFICIENCY: ICD-10-CM

## 2024-06-03 PROCEDURE — 99214 OFFICE O/P EST MOD 30 MIN: CPT | Performed by: FAMILY MEDICINE

## 2024-06-03 RX ORDER — ERGOCALCIFEROL 1.25 MG/1
CAPSULE ORAL
Qty: 4 CAPSULE | Refills: 3 | Status: SHIPPED | OUTPATIENT
Start: 2024-06-03

## 2024-06-03 RX ORDER — LEVOTHYROXINE SODIUM 0.05 MG/1
50 TABLET ORAL DAILY
Qty: 90 TABLET | Refills: 0 | Status: SHIPPED | OUTPATIENT
Start: 2024-06-03

## 2024-06-03 RX ORDER — LISINOPRIL 10 MG/1
10 TABLET ORAL DAILY
Qty: 90 TABLET | Refills: 0 | Status: SHIPPED | OUTPATIENT
Start: 2024-06-03

## 2024-06-03 NOTE — PROGRESS NOTES
"Chief Complaint  Weight Check    Subjective          Jaquan Carlson presents to [facility]  History of Present Illness    Patient here to follow up on weight loss. States she is doing well with current medications at this time. Denies any adverse side effects.   States her blood pressure has been creeping back up. States she was been in the 140/80s mostly, has been taking lisinopril as needed she had left over.     Review of Systems      Objective   Vital Signs:   /80 (BP Location: Right arm, Patient Position: Sitting, Cuff Size: Adult)   Pulse 88   Temp 97.7 °F (36.5 °C) (Temporal)   Ht 162.6 cm (64.02\")   Wt 82.6 kg (182 lb 3.2 oz)   SpO2 99%   BMI 31.26 kg/m²     Physical Exam  Constitutional:       General: She is not in acute distress.     Appearance: Normal appearance. She is well-developed and well-groomed. She is not ill-appearing, toxic-appearing or diaphoretic.   HENT:      Head: Normocephalic.      Nose: Nose normal. No congestion or rhinorrhea.      Mouth/Throat:      Mouth: Mucous membranes are moist.      Pharynx: Oropharynx is clear. No oropharyngeal exudate or posterior oropharyngeal erythema.   Eyes:      General: Lids are normal.         Right eye: No discharge.         Left eye: No discharge.      Extraocular Movements: Extraocular movements intact.      Pupils: Pupils are equal, round, and reactive to light.   Neck:      Vascular: No carotid bruit.   Cardiovascular:      Rate and Rhythm: Normal rate and regular rhythm.      Pulses: Normal pulses.      Heart sounds: Normal heart sounds. No murmur heard.     No friction rub. No gallop.   Pulmonary:      Effort: Pulmonary effort is normal. No respiratory distress.      Breath sounds: Normal breath sounds. No stridor. No wheezing, rhonchi or rales.   Chest:      Chest wall: No tenderness.   Abdominal:      General: Bowel sounds are normal. There is no distension.      Palpations: Abdomen is soft. There " is no mass.      Tenderness: There is no abdominal tenderness. There is no right CVA tenderness, left CVA tenderness, guarding or rebound.      Hernia: No hernia is present.   Musculoskeletal:         General: No swelling or tenderness. Normal range of motion.      Cervical back: Normal range of motion and neck supple. No rigidity or tenderness.      Right lower leg: No edema.      Left lower leg: No edema.   Lymphadenopathy:      Cervical: No cervical adenopathy.   Skin:     General: Skin is warm.      Capillary Refill: Capillary refill takes less than 2 seconds.      Coloration: Skin is not jaundiced.      Findings: No bruising, erythema or rash.   Neurological:      General: No focal deficit present.      Mental Status: She is alert and oriented to person, place, and time.      Motor: Motor function is intact. No weakness.      Coordination: Coordination is intact.      Gait: Gait is intact. Gait normal.   Psychiatric:         Attention and Perception: Attention normal.         Mood and Affect: Mood normal.         Speech: Speech normal.         Behavior: Behavior normal.         Cognition and Memory: Cognition normal.         Judgment: Judgment normal.        Result Review :                 Assessment and Plan    Diagnoses and all orders for this visit:    1. Weight loss (Primary)  -     CBC Auto Differential; Future  -     Comprehensive Metabolic Panel; Future  -     Hemoglobin A1c; Future  -     Lipid Panel; Future  -     T4, Free; Future  -     TSH; Future    2. Hypothyroidism, unspecified type  -     levothyroxine (Synthroid) 50 MCG tablet; Take 1 tablet by mouth Daily.  Dispense: 90 tablet; Refill: 0    3. Medication refill  -     levothyroxine (Synthroid) 50 MCG tablet; Take 1 tablet by mouth Daily.  Dispense: 90 tablet; Refill: 0    4. Vitamin D deficiency  -     vitamin D (ERGOCALCIFEROL) 1.25 MG (43695 UT) capsule capsule; TAKE 1 CAPSULE BY MOUTH WEEKLY  Dispense: 4 capsule; Refill: 3      Patient's  Body mass index is 31.26 kg/m². indicating that she is obese (BMI >30). Obesity-related health conditions include the following: hypertension. Obesity is unchanged. BMI is is above average; BMI management plan is completed. We discussed low calorie, low carb based diet program, portion control, and increasing exercise..    Follow Up   No follow-ups on file.  Patient was given instructions and counseling regarding her condition or for health maintenance advice. Please see specific information pulled into the AVS if appropriate.     This document has been electronically signed by PAVAN Castaneda  Eden 3, 2024 08:41 EDT

## 2024-06-13 ENCOUNTER — DISEASE STATE MANAGEMENT VISIT (OUTPATIENT)
Dept: PHARMACY | Facility: HOSPITAL | Age: 54
End: 2024-06-13
Payer: COMMERCIAL

## 2024-06-13 VITALS
WEIGHT: 178.4 LBS | SYSTOLIC BLOOD PRESSURE: 155 MMHG | DIASTOLIC BLOOD PRESSURE: 78 MMHG | HEART RATE: 82 BPM | BODY MASS INDEX: 30.46 KG/M2 | HEIGHT: 64 IN

## 2024-06-13 RX ORDER — SEMAGLUTIDE 2.4 MG/.75ML
2.4 INJECTION, SOLUTION SUBCUTANEOUS WEEKLY
Qty: 3 ML | Refills: 0 | Status: SHIPPED | OUTPATIENT
Start: 2024-06-13

## 2024-06-13 NOTE — PROGRESS NOTES
[facility]  Weight Management Program        Jaquan aCrlson is a 53 y.o. 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  Jaquan Carlson has previously tried Saxenda, Adipex, Wegovy, Mounjaro and lifestyle changes for weight loss.  Current weight loss efforts includes Wegovy. The patient denies a personal history or family history of thyroid cancer and denies a personal history of pancreatitis. Denies hoarseness/swelling/lumps in throat or severe abdominal pain.     Patient is currently on Wegovy 2.4 mg weekly. Patient denies any lumps/swelling/hoarseness in neck/throat or abdominal pain. Patient reports tolerating medication well without any side effects.      At previous visit, patient reported that Nicole wanted her to start on fish oil first with her cholesterol and she is using her BP med PRN when BP gets high. BP in clinic was 155/78. Encouraged patient to continue to monitor BP and speak with her PCP about her BP medication. She states her BP was 124/74 so she did not take her BP medicine last night, and she feels that is why her reading was higher in clinic today.    Patient reports that she has been doing doing well with her step count goal, but states she struggles meeting her water goal on the weekends at home. Patient would like to continue targeting her current SMART goals. Patient is meeting her meal prep goal and wishes to continue this. Water goal is going well. Hitting water goal during the week, but not on the weekends. Patient reports it makes her go to the bathroom frequently.      Relevant Past Medical History and Co-morbidities  Past Medical History:   Diagnosis Date    Diarrhea     Dyslipidemia 11/02/2022    Hypertensive disorder 10/07/2021    Hypothyroidism 12/06/2021    Umbilical hernia      Social History     Socioeconomic History     Marital status:    Tobacco Use    Smoking status: Never    Smokeless tobacco: Never   Vaping Use    Vaping status: Never Used   Substance and Sexual Activity    Alcohol use: No    Drug use: No    Sexual activity: Yes     Partners: Male     Birth control/protection: Surgical       Allergies  Penicillins    Current Medication List    Current Outpatient Medications:     levothyroxine (Synthroid) 50 MCG tablet, Take 1 tablet by mouth Daily., Disp: 90 tablet, Rfl: 0    lisinopril (PRINIVIL,ZESTRIL) 10 MG tablet, Take 1 tablet by mouth Daily., Disp: 90 tablet, Rfl: 0    Loratadine 10 MG capsule, Take 1 capsule by mouth Daily., Disp: , Rfl:     Omega-3 Fatty Acids (fish oil) 1000 MG capsule capsule, Take 1 capsule by mouth 2 (Two) Times a Day With Meals. (Patient taking differently: Take 1 capsule by mouth 3 (Three) Times a Day With Meals.), Disp: 60 capsule, Rfl: 5    ondansetron ODT (ZOFRAN-ODT) 4 MG disintegrating tablet, Dissolve 1 tablet on the tongue Every 12 (Twelve) Hours As Needed for Nausea or Vomiting., Disp: 45 tablet, Rfl: 1    Semaglutide-Weight Management (Wegovy) 2.4 MG/0.75ML solution auto-injector, Inject 2.4 mg under the skin into the appropriate area as directed 1 (One) Time Per Week., Disp: 3 mL, Rfl: 0    vitamin D (ERGOCALCIFEROL) 1.25 MG (72468 UT) capsule capsule, TAKE 1 CAPSULE BY MOUTH WEEKLY, Disp: 4 capsule, Rfl: 3    Drug Interactions  None     Relevant Laboratory Values  Lab Results   Component Value Date    CHOL 231 (H) 02/27/2024    CHLPL 199 08/15/2014    TRIG 130 02/27/2024    HDL 67 (H) 02/27/2024     (H) 02/27/2024     Vaccinations:   Patient recommended to keep up with routine vaccinations.     Goals of Therapy  Clinical Goals or Therapeutic Targets: 10% weight loss goal      Date 5/2/22 6/2/22 6/30/22 7/28/22 8/25/55 9/22/22   Weight (kg) 91.4 kg  201.08lb 190.6lb 185.4lb 180lb 181lb 175.6lb   BMI kg/ 34.6 32.71 31.8 30.9 31 30   Waist Circumference (in)  42 in 41.5in  "40in 40in 39.75in 39.75     Date 10/20/22 11/17/22 12/15/22 1/12/23 2/9/23 3/9/23 4/6/23 5/4/23 6/1/23 6/29/23   Weight (kg) 170 lbs 170.6 lbs 165.8lbs 164.6 lbs 162.6 lb 162.4 lb 163 lb 162 lb 164 lbs 162 lb   BMI kg/ 29.18 29.28 28.46 28.25 27.91 27.88 27.98 27.81 28.14 27.79   Waist Circumference (in)  38 in 37 in 36.5 in 37.75\" 36\" 37.5\" 36\" 35.5\" 37\" 36\"     Date 7/26/23 8/23/23 9/21/23 10/17/23 12/4/23 12/28/23 1/25/24 2/12/24 03/21/24 4/18/24   Weight (kg) 165.4 166 lb 166.2 lb 166.8 lbs 175.2 lb 177.6 lb 176.2 lb 179 lb 178.4 179.4 lb   BMI kg/ 28.37 28.48 28.51 28.61 30.05 30.43 30.23  30.95 30.77   Waist Circumference (in)  37.5 in 36\" 39\" 38.5\" 38\" 39.5\" 41\" 40\" 39.75\" 37\"     Date 5/16/24 6/13/24    Weight (kg) 178.4 lb 178.4 lb    BMI kg/ 30.60 30.60    Waist Circumference (in)  39.5\" 38.75\"        Medication Assessment & Plan    Patient's current BMI is 30.60, which is considered overweight. Patient has lost 22.68 lbs overall. Congratulated her on her success!   Patient has been maintaining weight/slight weight gain.     Will re-order Wegovy 2.4 SC weekly.     The following medications will need dose adjustments/closer monitoring once the GLP1 is started: levothyroxine      Discussed lifestyle modifications, including diet and exercise.  Patient education provided.     Worked with patient to create SMART Goal(s): Patient would like to continue these goals  1. Drink 80 oz of water everyday.   2. Continue to Achieve/Track 10,000 steps every day via her watch.  3. Begin weight bearing exercise at least 2 days/week.   4. Meal Plan Dinner 2 times a week. .    Will follow-up in 4 weeks.     Sandie Camilo, PharmD  6/13/2024  08:20 EDT  "

## 2024-07-12 ENCOUNTER — DISEASE STATE MANAGEMENT VISIT (OUTPATIENT)
Dept: PHARMACY | Facility: HOSPITAL | Age: 54
End: 2024-07-12
Payer: COMMERCIAL

## 2024-07-12 VITALS
HEIGHT: 64 IN | HEART RATE: 59 BPM | WEIGHT: 180.6 LBS | DIASTOLIC BLOOD PRESSURE: 68 MMHG | SYSTOLIC BLOOD PRESSURE: 120 MMHG | BODY MASS INDEX: 30.83 KG/M2

## 2024-07-12 RX ORDER — SEMAGLUTIDE 2.4 MG/.75ML
2.4 INJECTION, SOLUTION SUBCUTANEOUS WEEKLY
Qty: 9 ML | Refills: 0 | Status: SHIPPED | OUTPATIENT
Start: 2024-07-12

## 2024-07-12 NOTE — PROGRESS NOTES
[facility]  Weight Management Program        Jaquan Carlson is a 53 y.o. 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  Jaquan Carlson has previously tried Saxenda, Adipex, Wegovy, Mounjaro and lifestyle changes for weight loss.  Current weight loss efforts includes Wegovy. The patient denies a personal history or family history of thyroid cancer and denies a personal history of pancreatitis. Denies hoarseness/swelling/lumps in throat or severe abdominal pain.     Patient is currently on Wegovy 2.4 mg weekly. Patient denies any lumps/swelling/hoarseness in neck/throat or abdominal pain. Patient reports tolerating medication well without any side effects.      At previous visit, patient reported that Nicole wanted her to start on fish oil first with her cholesterol and she is using her BP med PRN when BP gets high. BP in clinic was 155/78. Encouraged patient to continue to monitor BP and speak with her PCP about her BP medication. She states her BP was 124/74 so she did not take her BP medicine last night, and she feels that is why her reading was higher in clinic today.    Patient reports that she has been doing doing well with her step count goal, but states she struggles meeting her water goal on the weekends at home. Patient would like to continue targeting her current SMART goals. Patient is meeting her meal prep goal and wishes to continue this. Water goal is going well. Hitting water goal during the week, but not on the weekends. Patient reports it makes her go to the bathroom frequently.      Relevant Past Medical History and Co-morbidities  Past Medical History:   Diagnosis Date    Diarrhea     Dyslipidemia 11/02/2022    Hypertensive disorder 10/07/2021    Hypothyroidism 12/06/2021    Umbilical hernia      Social History     Socioeconomic History     Marital status:    Tobacco Use    Smoking status: Never    Smokeless tobacco: Never   Vaping Use    Vaping status: Never Used   Substance and Sexual Activity    Alcohol use: No    Drug use: No    Sexual activity: Yes     Partners: Male     Birth control/protection: Surgical       Allergies  Penicillins    Current Medication List    Current Outpatient Medications:     levothyroxine (Synthroid) 50 MCG tablet, Take 1 tablet by mouth Daily., Disp: 90 tablet, Rfl: 0    lisinopril (PRINIVIL,ZESTRIL) 10 MG tablet, Take 1 tablet by mouth Daily., Disp: 90 tablet, Rfl: 0    Loratadine 10 MG capsule, Take 1 capsule by mouth Daily., Disp: , Rfl:     Omega-3 Fatty Acids (fish oil) 1000 MG capsule capsule, Take 1 capsule by mouth 2 (Two) Times a Day With Meals. (Patient taking differently: Take 1 capsule by mouth 3 (Three) Times a Day With Meals.), Disp: 60 capsule, Rfl: 5    ondansetron ODT (ZOFRAN-ODT) 4 MG disintegrating tablet, Dissolve 1 tablet on the tongue Every 12 (Twelve) Hours As Needed for Nausea or Vomiting., Disp: 45 tablet, Rfl: 1    Semaglutide-Weight Management (Wegovy) 2.4 MG/0.75ML solution auto-injector, Inject 2.4 mg under the skin into the appropriate area as directed 1 (One) Time Per Week., Disp: 3 mL, Rfl: 0    vitamin D (ERGOCALCIFEROL) 1.25 MG (71126 UT) capsule capsule, TAKE 1 CAPSULE BY MOUTH WEEKLY, Disp: 4 capsule, Rfl: 3    Drug Interactions  None     Relevant Laboratory Values  Lab Results   Component Value Date    CHOL 231 (H) 02/27/2024    CHLPL 199 08/15/2014    TRIG 130 02/27/2024    HDL 67 (H) 02/27/2024     (H) 02/27/2024     Vaccinations:   Patient recommended to keep up with routine vaccinations.     Goals of Therapy  Clinical Goals or Therapeutic Targets: 10% weight loss goal      Date 5/2/22 6/2/22 6/30/22 7/28/22 8/25/55 9/22/22   Weight (kg) 91.4 kg  201.08lb 190.6lb 185.4lb 180lb 181lb 175.6lb   BMI kg/ 34.6 32.71 31.8 30.9 31 30   Waist Circumference (in)  42 in 41.5in  "40in 40in 39.75in 39.75     Date 10/20/22 11/17/22 12/15/22 1/12/23 2/9/23 3/9/23 4/6/23 5/4/23 6/1/23 6/29/23   Weight (kg) 170 lbs 170.6 lbs 165.8lbs 164.6 lbs 162.6 lb 162.4 lb 163 lb 162 lb 164 lbs 162 lb   BMI kg/ 29.18 29.28 28.46 28.25 27.91 27.88 27.98 27.81 28.14 27.79   Waist Circumference (in)  38 in 37 in 36.5 in 37.75\" 36\" 37.5\" 36\" 35.5\" 37\" 36\"     Date 7/26/23 8/23/23 9/21/23 10/17/23 12/4/23 12/28/23 1/25/24 2/12/24 03/21/24 4/18/24   Weight (kg) 165.4 166 lb 166.2 lb 166.8 lbs 175.2 lb 177.6 lb 176.2 lb 179 lb 178.4 179.4 lb   BMI kg/ 28.37 28.48 28.51 28.61 30.05 30.43 30.23  30.95 30.77   Waist Circumference (in)  37.5 in 36\" 39\" 38.5\" 38\" 39.5\" 41\" 40\" 39.75\" 37\"     Date 5/16/24 6/13/24 7/12/24   Weight (kg) 178.4 lb 178.4 lb 180.6 lb   BMI kg/ 30.60 30.60 30.98   Waist Circumference (in)  39.5\" 38.75\" 40\"       Medication Assessment & Plan    Patient's current BMI is 30.98, which is considered overweight. Patient has lost 22.68 lbs overall. Congratulated her on her success!    Patient has been maintaining weight/slight weight gain.     Will re-order Wegovy 2.4 SC weekly.  3 month supply sent today due to insurance coverage. Patient aware to maintain routine follow up for duration of fills. No Rx needs sent in at next visits.     The following medications will need dose adjustments/closer monitoring once the GLP1 is started: levothyroxine      Discussed lifestyle modifications, including diet and exercise.  Patient education provided.     Worked with patient to create SMART Goal(s): Patient would like to continue these goals  1. Drink 80 oz of water everyday.   2. Continue to Achieve/Track 10,000 steps every day via her watch.  3. Begin weight bearing exercise at least 2 days/week.   4. Meal Plan Dinner 2 times a week. .    Will follow-up in 4 weeks.     Makenna Snell. Caitlin, PharmD  7/12/2024  08:19 EDT  "

## 2024-07-18 DIAGNOSIS — E66.9 CLASS 1 OBESITY WITH BODY MASS INDEX (BMI) OF 33.0 TO 33.9 IN ADULT, UNSPECIFIED OBESITY TYPE, UNSPECIFIED WHETHER SERIOUS COMORBIDITY PRESENT: ICD-10-CM

## 2024-07-18 DIAGNOSIS — E78.5 DYSLIPIDEMIA: ICD-10-CM

## 2024-07-18 RX ORDER — CHLORAL HYDRATE 500 MG
1000 CAPSULE ORAL 2 TIMES DAILY WITH MEALS
Qty: 60 CAPSULE | Refills: 5 | Status: SHIPPED | OUTPATIENT
Start: 2024-07-18

## 2024-07-31 NOTE — PROGRESS NOTES
"Chief Complaint  Obesity    Subjective          Jaquan Carlson presents to [facility]  History of Present Illness  Patient is here to establish care and follow up on obesity. Has been doing well with the weight loss clinic and needs a new referral to continue seeing them. Denies any thyroid or voice changes.   Doing well with synthroid at this time.     Review of Systems      Objective   Vital Signs:   /70 (BP Location: Right arm, Patient Position: Sitting, Cuff Size: Adult)   Pulse 98   Temp 97.8 °F (36.6 °C) (Temporal)   Ht 162.6 cm (64\")   Wt 76.1 kg (167 lb 12.8 oz)   SpO2 95%   BMI 28.80 kg/m²     Physical Exam  Constitutional:       General: She is not in acute distress.     Appearance: Normal appearance. She is well-developed and well-groomed. She is not ill-appearing, toxic-appearing or diaphoretic.   HENT:      Head: Normocephalic.      Nose: Nose normal. No congestion or rhinorrhea.      Mouth/Throat:      Mouth: Mucous membranes are moist.      Pharynx: Oropharynx is clear. No oropharyngeal exudate or posterior oropharyngeal erythema.   Eyes:      General: Lids are normal.         Right eye: No discharge.         Left eye: No discharge.      Extraocular Movements: Extraocular movements intact.      Pupils: Pupils are equal, round, and reactive to light.   Neck:      Vascular: No carotid bruit.   Cardiovascular:      Rate and Rhythm: Normal rate and regular rhythm.      Pulses: Normal pulses.      Heart sounds: Normal heart sounds. No murmur heard.    No friction rub. No gallop.   Pulmonary:      Effort: Pulmonary effort is normal. No respiratory distress.      Breath sounds: Normal breath sounds. No stridor. No wheezing, rhonchi or rales.   Chest:      Chest wall: No tenderness.   Abdominal:      General: Bowel sounds are normal. There is no distension.      Palpations: Abdomen is soft. There is no mass.      Tenderness: There is no abdominal tenderness. " Patient returned nurse call please call back at [phone]   There is no right CVA tenderness, left CVA tenderness, guarding or rebound.      Hernia: No hernia is present.   Musculoskeletal:         General: No swelling or tenderness. Normal range of motion.      Cervical back: Normal range of motion and neck supple. No rigidity or tenderness.      Right lower leg: No edema.      Left lower leg: No edema.   Lymphadenopathy:      Cervical: No cervical adenopathy.   Skin:     General: Skin is warm.      Capillary Refill: Capillary refill takes less than 2 seconds.      Coloration: Skin is not jaundiced.      Findings: No bruising, erythema or rash.   Neurological:      General: No focal deficit present.      Mental Status: She is alert and oriented to person, place, and time.      Motor: Motor function is intact. No weakness.      Coordination: Coordination is intact.      Gait: Gait is intact. Gait normal.   Psychiatric:         Attention and Perception: Attention normal.         Mood and Affect: Mood normal.         Speech: Speech normal.         Behavior: Behavior normal.         Cognition and Memory: Cognition normal.         Judgment: Judgment normal.        Result Review :                 Assessment and Plan    Diagnoses and all orders for this visit:    1. Overweight with body mass index (BMI) of 29 to 29.9 in adult (Primary)  Comments:  new weight loss referral faxed over.     2. Hypothyroidism, unspecified type      Patient's Body mass index is 28.8 kg/m². indicating that she is overweight (BMI 25-29.9). Patient's (Body mass index is 28.8 kg/m².) indicates that they are overweight with health conditions that include hypertension . Weight is unchanged. BMI is is above average; BMI management plan is completed. We discussed low calorie, low carb based diet program, portion control and increasing exercise. .    Follow Up   Return in about 3 months (around 4/18/2023), or if symptoms worsen or fail to improve.  Patient was given instructions and counseling regarding her  condition or for health maintenance advice. Please see specific information pulled into the AVS if appropriate.     This document has been electronically signed by PAVAN Castaneda  January 19, 2023 16:14 EST

## 2024-08-14 ENCOUNTER — DISEASE STATE MANAGEMENT VISIT (OUTPATIENT)
Dept: PHARMACY | Facility: HOSPITAL | Age: 54
End: 2024-08-14
Payer: COMMERCIAL

## 2024-08-14 VITALS
HEART RATE: 104 BPM | BODY MASS INDEX: 30.46 KG/M2 | HEIGHT: 64 IN | DIASTOLIC BLOOD PRESSURE: 76 MMHG | SYSTOLIC BLOOD PRESSURE: 143 MMHG | WEIGHT: 178.4 LBS

## 2024-08-14 NOTE — PROGRESS NOTES
[facility]  Weight Management Program        Jaquan Carlson is a 54 y.o. 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  Jaquan Carlson has previously tried Saxenda, Adipex, Wegovy, Mounjaro and lifestyle changes for weight loss.  Current weight loss efforts includes Wegovy. The patient denies a personal history or family history of thyroid cancer and denies a personal history of pancreatitis. Denies hoarseness/swelling/lumps in throat or severe abdominal pain.     Patient is currently on Wegovy 2.4 mg weekly. Patient denies any lumps/swelling/hoarseness in neck/throat or abdominal pain. Patient reports tolerating medication well without any side effects.      Patient has addressed BP with Nicole Garcia. She monitors her BP at night and if its high she takes her BP medication.     Patient reports that she has been doing doing well with her step count goal, but states she struggles meeting her water goal on the weekends at home. Patient would like to continue targeting her current SMART goals. Patient is meeting her meal prep goal and wishes to continue this. Water goal is going well. Hitting water goal during the week, but not on the weekends. Patient reports it makes her go to the bathroom frequently.      Relevant Past Medical History and Co-morbidities  Past Medical History:   Diagnosis Date    Diarrhea     Dyslipidemia 11/02/2022    Hypertensive disorder 10/07/2021    Hypothyroidism 12/06/2021    Umbilical hernia      Social History     Socioeconomic History    Marital status:    Tobacco Use    Smoking status: Never    Smokeless tobacco: Never   Vaping Use    Vaping status: Never Used   Substance and Sexual Activity    Alcohol use: No    Drug use: No    Sexual activity: Yes     Partners: Male     Birth control/protection: Surgical  "      Allergies  Penicillins    Current Medication List    Current Outpatient Medications:     levothyroxine (Synthroid) 50 MCG tablet, Take 1 tablet by mouth Daily., Disp: 90 tablet, Rfl: 0    lisinopril (PRINIVIL,ZESTRIL) 10 MG tablet, Take 1 tablet by mouth Daily., Disp: 90 tablet, Rfl: 0    Loratadine 10 MG capsule, Take 1 capsule by mouth Daily., Disp: , Rfl:     Omega-3 Fatty Acids (fish oil) 1000 MG capsule capsule, Take 1 capsule by mouth 2 (Two) Times a Day With Meals., Disp: 60 capsule, Rfl: 5    ondansetron ODT (ZOFRAN-ODT) 4 MG disintegrating tablet, Dissolve 1 tablet on the tongue Every 12 (Twelve) Hours As Needed for Nausea or Vomiting., Disp: 45 tablet, Rfl: 1    Semaglutide-Weight Management (Wegovy) 2.4 MG/0.75ML solution auto-injector, Inject 2.4 mg under the skin into the appropriate area as directed 1 (One) Time Per Week., Disp: 9 mL, Rfl: 0    vitamin D (ERGOCALCIFEROL) 1.25 MG (21244 UT) capsule capsule, TAKE 1 CAPSULE BY MOUTH WEEKLY, Disp: 4 capsule, Rfl: 3    Drug Interactions  None     Relevant Laboratory Values  Lab Results   Component Value Date    CHOL 231 (H) 02/27/2024    CHLPL 199 08/15/2014    TRIG 130 02/27/2024    HDL 67 (H) 02/27/2024     (H) 02/27/2024     Vaccinations:   Patient recommended to keep up with routine vaccinations.     Goals of Therapy  Clinical Goals or Therapeutic Targets: 10% weight loss goal      Date 5/2/22 6/2/22 6/30/22 7/28/22 8/25/55 9/22/22   Weight (kg) 91.4 kg  201.08lb 190.6lb 185.4lb 180lb 181lb 175.6lb   BMI kg/ 34.6 32.71 31.8 30.9 31 30   Waist Circumference (in)  42 in 41.5in 40in 40in 39.75in 39.75     Date 10/20/22 11/17/22 12/15/22 1/12/23 2/9/23 3/9/23 4/6/23 5/4/23 6/1/23 6/29/23   Weight (kg) 170 lbs 170.6 lbs 165.8lbs 164.6 lbs 162.6 lb 162.4 lb 163 lb 162 lb 164 lbs 162 lb   BMI kg/ 29.18 29.28 28.46 28.25 27.91 27.88 27.98 27.81 28.14 27.79   Waist Circumference (in)  38 in 37 in 36.5 in 37.75\" 36\" 37.5\" 36\" 35.5\" 37\" 36\"     Date " "7/26/23 8/23/23 9/21/23 10/17/23 12/4/23 12/28/23 1/25/24 2/12/24 03/21/24 4/18/24   Weight (kg) 165.4 166 lb 166.2 lb 166.8 lbs 175.2 lb 177.6 lb 176.2 lb 179 lb 178.4 179.4 lb   BMI kg/ 28.37 28.48 28.51 28.61 30.05 30.43 30.23  30.95 30.77   Waist Circumference (in)  37.5 in 36\" 39\" 38.5\" 38\" 39.5\" 41\" 40\" 39.75\" 37\"     Date 5/16/24 6/13/24 7/12/24 8/14/24   Weight (kg) 178.4 lb 178.4 lb 180.6 lb 178.4 lb   BMI kg/ 30.60 30.60 30.98 30.60   Waist Circumference (in)  39.5\" 38.75\" 40\" 35.5\"       Medication Assessment & Plan    Patient's current BMI is 30.60, which is considered overweight. Patient has lost 22.68 lbs overall. Congratulated her on her success!    Patient has been maintaining weight/slight weight gain.     Will re-order Wegovy 2.4 SC weekly.  3 month supply sent in July due to insurance coverage. Patient aware to maintain routine follow up for duration of fills. No Rx needs sent in at next visits.     The following medications will need dose adjustments/closer monitoring once the GLP1 is started: levothyroxine      Discussed lifestyle modifications, including diet and exercise.  Patient education provided.     Worked with patient to create SMART Goal(s): Patient would like to continue these goals  1. Drink 80 oz of water everyday.   2. Continue to Achieve/Track 10,000 steps every day via her watch.  3. Begin weight bearing exercise at least 2 days/week.   4. Meal Plan Dinner 2 times a week. .    Will follow-up in 4 weeks.     Sandie Camilo, PharmD  8/14/2024  08:17 EDT  "

## 2024-08-26 RX ORDER — LISINOPRIL 10 MG/1
10 TABLET ORAL DAILY
Qty: 90 TABLET | Refills: 0 | OUTPATIENT
Start: 2024-08-26

## 2024-08-28 ENCOUNTER — LAB (OUTPATIENT)
Dept: LAB | Facility: HOSPITAL | Age: 54
End: 2024-08-28
Payer: COMMERCIAL

## 2024-08-28 DIAGNOSIS — R63.4 WEIGHT LOSS: ICD-10-CM

## 2024-08-28 LAB
ALBUMIN SERPL-MCNC: 4.4 G/DL (ref 3.5–5.2)
ALBUMIN/GLOB SERPL: 1.8 G/DL
ALP SERPL-CCNC: 79 U/L (ref 39–117)
ALT SERPL W P-5'-P-CCNC: 16 U/L (ref 1–33)
ANION GAP SERPL CALCULATED.3IONS-SCNC: 9 MMOL/L (ref 5–15)
AST SERPL-CCNC: 14 U/L (ref 1–32)
BASOPHILS # BLD AUTO: 0.04 10*3/MM3 (ref 0–0.2)
BASOPHILS NFR BLD AUTO: 0.7 % (ref 0–1.5)
BILIRUB SERPL-MCNC: 0.3 MG/DL (ref 0–1.2)
BUN SERPL-MCNC: 15 MG/DL (ref 6–20)
BUN/CREAT SERPL: 13.3 (ref 7–25)
CALCIUM SPEC-SCNC: 9.5 MG/DL (ref 8.6–10.5)
CHLORIDE SERPL-SCNC: 107 MMOL/L (ref 98–107)
CHOLEST SERPL-MCNC: 208 MG/DL (ref 0–200)
CO2 SERPL-SCNC: 22 MMOL/L (ref 22–29)
CREAT SERPL-MCNC: 1.13 MG/DL (ref 0.57–1)
DEPRECATED RDW RBC AUTO: 41.8 FL (ref 37–54)
EGFRCR SERPLBLD CKD-EPI 2021: 57.6 ML/MIN/1.73
EOSINOPHIL # BLD AUTO: 0.1 10*3/MM3 (ref 0–0.4)
EOSINOPHIL NFR BLD AUTO: 1.7 % (ref 0.3–6.2)
ERYTHROCYTE [DISTWIDTH] IN BLOOD BY AUTOMATED COUNT: 12.8 % (ref 12.3–15.4)
GLOBULIN UR ELPH-MCNC: 2.5 GM/DL
GLUCOSE SERPL-MCNC: 85 MG/DL (ref 65–99)
HBA1C MFR BLD: 5.5 % (ref 4.8–5.6)
HCT VFR BLD AUTO: 44.7 % (ref 34–46.6)
HDLC SERPL-MCNC: 56 MG/DL (ref 40–60)
HGB BLD-MCNC: 14.8 G/DL (ref 12–15.9)
IMM GRANULOCYTES # BLD AUTO: 0.01 10*3/MM3 (ref 0–0.05)
IMM GRANULOCYTES NFR BLD AUTO: 0.2 % (ref 0–0.5)
LDLC SERPL CALC-MCNC: 130 MG/DL (ref 0–100)
LDLC/HDLC SERPL: 2.28 {RATIO}
LYMPHOCYTES # BLD AUTO: 3.12 10*3/MM3 (ref 0.7–3.1)
LYMPHOCYTES NFR BLD AUTO: 51.9 % (ref 19.6–45.3)
MCH RBC QN AUTO: 29.7 PG (ref 26.6–33)
MCHC RBC AUTO-ENTMCNC: 33.1 G/DL (ref 31.5–35.7)
MCV RBC AUTO: 89.8 FL (ref 79–97)
MONOCYTES # BLD AUTO: 0.39 10*3/MM3 (ref 0.1–0.9)
MONOCYTES NFR BLD AUTO: 6.5 % (ref 5–12)
NEUTROPHILS NFR BLD AUTO: 2.35 10*3/MM3 (ref 1.7–7)
NEUTROPHILS NFR BLD AUTO: 39 % (ref 42.7–76)
NRBC BLD AUTO-RTO: 0 /100 WBC (ref 0–0.2)
PLATELET # BLD AUTO: 331 10*3/MM3 (ref 140–450)
PMV BLD AUTO: 10 FL (ref 6–12)
POTASSIUM SERPL-SCNC: 4.1 MMOL/L (ref 3.5–5.2)
PROT SERPL-MCNC: 6.9 G/DL (ref 6–8.5)
RBC # BLD AUTO: 4.98 10*6/MM3 (ref 3.77–5.28)
SODIUM SERPL-SCNC: 138 MMOL/L (ref 136–145)
T4 FREE SERPL-MCNC: 1.35 NG/DL (ref 0.92–1.68)
TRIGL SERPL-MCNC: 123 MG/DL (ref 0–150)
TSH SERPL DL<=0.05 MIU/L-ACNC: 1.44 UIU/ML (ref 0.27–4.2)
VLDLC SERPL-MCNC: 22 MG/DL (ref 5–40)
WBC NRBC COR # BLD AUTO: 6.01 10*3/MM3 (ref 3.4–10.8)

## 2024-08-28 PROCEDURE — 83036 HEMOGLOBIN GLYCOSYLATED A1C: CPT

## 2024-08-28 PROCEDURE — 36415 COLL VENOUS BLD VENIPUNCTURE: CPT

## 2024-08-28 PROCEDURE — 84439 ASSAY OF FREE THYROXINE: CPT

## 2024-08-28 PROCEDURE — 80061 LIPID PANEL: CPT

## 2024-08-28 PROCEDURE — 80050 GENERAL HEALTH PANEL: CPT

## 2024-08-28 RX ORDER — LISINOPRIL 10 MG/1
10 TABLET ORAL DAILY
Qty: 90 TABLET | Refills: 0 | OUTPATIENT
Start: 2024-08-28

## 2024-08-29 RX ORDER — LISINOPRIL 10 MG/1
10 TABLET ORAL DAILY
Qty: 90 TABLET | Refills: 0 | OUTPATIENT
Start: 2024-08-29

## 2024-08-30 ENCOUNTER — TRANSCRIBE ORDERS (OUTPATIENT)
Dept: ADMINISTRATIVE | Facility: HOSPITAL | Age: 54
End: 2024-08-30
Payer: COMMERCIAL

## 2024-08-30 DIAGNOSIS — Z12.31 VISIT FOR SCREENING MAMMOGRAM: Primary | ICD-10-CM

## 2024-09-01 RX ORDER — LISINOPRIL 10 MG/1
10 TABLET ORAL DAILY
Qty: 90 TABLET | Refills: 0 | Status: CANCELLED | OUTPATIENT
Start: 2024-09-01

## 2024-09-03 ENCOUNTER — TELEPHONE (OUTPATIENT)
Dept: FAMILY MEDICINE CLINIC | Facility: CLINIC | Age: 54
End: 2024-09-03

## 2024-09-03 ENCOUNTER — OFFICE VISIT (OUTPATIENT)
Dept: FAMILY MEDICINE CLINIC | Facility: CLINIC | Age: 54
End: 2024-09-03
Payer: COMMERCIAL

## 2024-09-03 VITALS
OXYGEN SATURATION: 99 % | SYSTOLIC BLOOD PRESSURE: 126 MMHG | BODY MASS INDEX: 30.49 KG/M2 | DIASTOLIC BLOOD PRESSURE: 78 MMHG | HEART RATE: 98 BPM | WEIGHT: 178.6 LBS | HEIGHT: 64 IN | TEMPERATURE: 97.7 F

## 2024-09-03 DIAGNOSIS — U07.1 COVID-19: ICD-10-CM

## 2024-09-03 DIAGNOSIS — E03.9 HYPOTHYROIDISM, UNSPECIFIED TYPE: ICD-10-CM

## 2024-09-03 DIAGNOSIS — Z76.0 MEDICATION REFILL: ICD-10-CM

## 2024-09-03 DIAGNOSIS — R05.9 COUGH, UNSPECIFIED TYPE: Primary | ICD-10-CM

## 2024-09-03 DIAGNOSIS — E55.9 VITAMIN D DEFICIENCY: ICD-10-CM

## 2024-09-03 LAB
EXPIRATION DATE: ABNORMAL
FLUAV AG UPPER RESP QL IA.RAPID: NOT DETECTED
FLUBV AG UPPER RESP QL IA.RAPID: NOT DETECTED
INTERNAL CONTROL: ABNORMAL
Lab: ABNORMAL
SARS-COV-2 AG UPPER RESP QL IA.RAPID: DETECTED

## 2024-09-03 PROCEDURE — 99214 OFFICE O/P EST MOD 30 MIN: CPT | Performed by: FAMILY MEDICINE

## 2024-09-03 PROCEDURE — 87428 SARSCOV & INF VIR A&B AG IA: CPT | Performed by: FAMILY MEDICINE

## 2024-09-03 RX ORDER — LEVOTHYROXINE SODIUM 50 UG/1
50 TABLET ORAL DAILY
Qty: 90 TABLET | Refills: 0 | Status: SHIPPED | OUTPATIENT
Start: 2024-09-03

## 2024-09-03 RX ORDER — ERGOCALCIFEROL 1.25 MG/1
CAPSULE, LIQUID FILLED ORAL
Qty: 4 CAPSULE | Refills: 3 | Status: SHIPPED | OUTPATIENT
Start: 2024-09-03

## 2024-09-03 RX ORDER — LISINOPRIL 10 MG/1
10 TABLET ORAL DAILY
Qty: 90 TABLET | Refills: 0 | Status: SHIPPED | OUTPATIENT
Start: 2024-09-03

## 2024-09-03 NOTE — TELEPHONE ENCOUNTER
Patient given discharge instructions and after visit summary. Patient verbalized understanding of discharge instructions and after visit summary. Patient given time for questions, patient verbalized no questions at this time. Patient to change clothes and pack belongings to prepare for discharge.    Please confirm Paxlovid dosing... is it standard or renal dosing?

## 2024-09-03 NOTE — PROGRESS NOTES
"Chief Complaint  Weight Loss    Subjective          Jaquan Carlson presents to [facility]  Hypertension  This is a chronic problem. The current episode started more than 1 year ago. The problem is controlled. Current antihypertension treatment includes ACE inhibitors. The current treatment provides significant improvement.       Patient here to follow up on weight loss. States she is doing well with current medications at this time. Denies any adverse side effects.     States her  tested positive for covid. States she is having some congestion.     Review of Systems      Objective   Vital Signs:   /78 (BP Location: Right arm, Patient Position: Sitting)   Pulse 98   Temp 97.7 °F (36.5 °C)   Ht 162.6 cm (64\")   Wt 81 kg (178 lb 9.6 oz)   SpO2 99%   BMI 30.66 kg/m²     Physical Exam  Constitutional:       General: She is not in acute distress.     Appearance: Normal appearance. She is well-developed and well-groomed. She is not ill-appearing, toxic-appearing or diaphoretic.   HENT:      Head: Normocephalic.      Nose: Nose normal. Congestion and rhinorrhea present.      Mouth/Throat:      Mouth: Mucous membranes are moist.      Pharynx: Oropharynx is clear. No oropharyngeal exudate.   Eyes:      General: Lids are normal.         Right eye: No discharge.         Left eye: No discharge.      Extraocular Movements: Extraocular movements intact.      Pupils: Pupils are equal, round, and reactive to light.   Neck:      Vascular: No carotid bruit.   Cardiovascular:      Rate and Rhythm: Normal rate and regular rhythm.      Pulses: Normal pulses.      Heart sounds: Normal heart sounds. No murmur heard.     No friction rub. No gallop.   Pulmonary:      Effort: Pulmonary effort is normal. No respiratory distress.      Breath sounds: Normal breath sounds. No stridor. No wheezing, rhonchi or rales.   Chest:      Chest wall: No tenderness.   Abdominal:      General: Bowel " sounds are normal. There is no distension.      Palpations: Abdomen is soft. There is no mass.      Tenderness: There is no abdominal tenderness. There is no right CVA tenderness, left CVA tenderness, guarding or rebound.      Hernia: No hernia is present.   Musculoskeletal:         General: No swelling or tenderness. Normal range of motion.      Cervical back: Normal range of motion and neck supple. No rigidity or tenderness.      Right lower leg: No edema.      Left lower leg: No edema.   Lymphadenopathy:      Cervical: No cervical adenopathy.   Skin:     General: Skin is warm.      Capillary Refill: Capillary refill takes less than 2 seconds.      Coloration: Skin is not jaundiced.      Findings: No bruising, erythema or rash.   Neurological:      General: No focal deficit present.      Mental Status: She is alert and oriented to person, place, and time.      Motor: Motor function is intact. No weakness.      Coordination: Coordination is intact.      Gait: Gait is intact. Gait normal.   Psychiatric:         Attention and Perception: Attention normal.         Mood and Affect: Mood normal.         Speech: Speech normal.         Behavior: Behavior normal.         Cognition and Memory: Cognition normal.         Judgment: Judgment normal.        Result Review :                 Assessment and Plan    Diagnoses and all orders for this visit:    1. Cough, unspecified type (Primary)  -     POCT SARS-CoV-2 Antigen RICH + Flu    2. Hypothyroidism, unspecified type  -     levothyroxine (Synthroid) 50 MCG tablet; Take 1 tablet by mouth Daily.  Dispense: 90 tablet; Refill: 0    3. Medication refill  -     levothyroxine (Synthroid) 50 MCG tablet; Take 1 tablet by mouth Daily.  Dispense: 90 tablet; Refill: 0    4. Vitamin D deficiency  -     vitamin D (ERGOCALCIFEROL) 1.25 MG (47376 UT) capsule capsule; TAKE 1 CAPSULE BY MOUTH WEEKLY  Dispense: 4 capsule; Refill: 3    5. COVID-19  -     Nirmatrelvir & Ritonavir, 300mg/100mg,  (PAXLOVID); Take 2 tablets by mouth 2 (Two) Times a Day for 5 days.  Dispense: 20 tablet; Refill: 0    Other orders  -     Discontinue: Nirmatrelvir & Ritonavir, 300mg/100mg, (PAXLOVID); Take 2 tablets by mouth 2 (Two) Times a Day for 5 days.  Dispense: 20 tablet; Refill: 0  -     lisinopril (PRINIVIL,ZESTRIL) 10 MG tablet; Take 1 tablet by mouth Daily.  Dispense: 90 tablet; Refill: 0      Patient's Body mass index is 30.66 kg/m². indicating that she is obese (BMI >30). Obesity-related health conditions include the following: hypertension and dyslipidemias. Obesity is unchanged. BMI is is above average; BMI management plan is completed. We discussed low calorie, low carb based diet program, portion control, and increasing exercise..    Follow Up   Return in about 3 months (around 12/3/2024), or if symptoms worsen or fail to improve.  Patient was given instructions and counseling regarding her condition or for health maintenance advice. Please see specific information pulled into the AVS if appropriate.     This document has been electronically signed by PAVAN Castaneda  September 3, 2024 09:44 EDT

## 2024-09-13 ENCOUNTER — DISEASE STATE MANAGEMENT VISIT (OUTPATIENT)
Dept: PHARMACY | Facility: HOSPITAL | Age: 54
End: 2024-09-13
Payer: COMMERCIAL

## 2024-09-13 VITALS
SYSTOLIC BLOOD PRESSURE: 118 MMHG | BODY MASS INDEX: 30.05 KG/M2 | WEIGHT: 176 LBS | HEART RATE: 90 BPM | HEIGHT: 64 IN | DIASTOLIC BLOOD PRESSURE: 67 MMHG

## 2024-09-13 NOTE — PROGRESS NOTES
[facility]  Weight Management Program        Jaquan Carlson is a 54 y.o. 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  Jaquan Carlson has previously tried Saxenda, Adipex, Wegovy, Mounjaro and lifestyle changes for weight loss.  Current weight loss efforts includes Wegovy. The patient denies a personal history or family history of thyroid cancer and denies a personal history of pancreatitis. Denies hoarseness/swelling/lumps in throat or severe abdominal pain.     Patient is currently on Wegovy 2.4 mg weekly. Patient denies any lumps/swelling/hoarseness in neck/throat or abdominal pain. Patient reports tolerating medication well without any side effects.      Patient has addressed BP with Nicole Garcia. She monitors her BP at night and if its high she takes her BP medication.     Patient reports that she has been doing doing well with her step count goal, but states she struggles meeting her water goal on the weekends at home. Patient would like to continue targeting her current SMART goals. Patient is meeting her meal prep goal and wishes to continue this. Water goal is going well. Hitting water goal during the week, but not on the weekends. Patient reports it makes her go to the bathroom frequently.    Patient and her  recently had COVID and Nicole Garcia said her kidney function was down. Her focus is mainly drinking more water to help with kidney function.      Relevant Past Medical History and Co-morbidities  Past Medical History:   Diagnosis Date    Diarrhea     Dyslipidemia 11/02/2022    Hypertensive disorder 10/07/2021    Hypothyroidism 12/06/2021    Umbilical hernia      Social History     Socioeconomic History    Marital status:    Tobacco Use    Smoking status: Never    Smokeless tobacco: Never   Vaping Use    Vaping status: Never  Used   Substance and Sexual Activity    Alcohol use: No    Drug use: No    Sexual activity: Yes     Partners: Male     Birth control/protection: Surgical       Allergies  Penicillins    Current Medication List    Current Outpatient Medications:     levothyroxine (Synthroid) 50 MCG tablet, Take 1 tablet by mouth Daily., Disp: 90 tablet, Rfl: 0    lisinopril (PRINIVIL,ZESTRIL) 10 MG tablet, Take 1 tablet by mouth Daily., Disp: 90 tablet, Rfl: 0    Loratadine 10 MG capsule, Take 1 capsule by mouth Daily., Disp: , Rfl:     Omega-3 Fatty Acids (fish oil) 1000 MG capsule capsule, Take 1 capsule by mouth 2 (Two) Times a Day With Meals., Disp: 60 capsule, Rfl: 5    ondansetron ODT (ZOFRAN-ODT) 4 MG disintegrating tablet, Dissolve 1 tablet on the tongue Every 12 (Twelve) Hours As Needed for Nausea or Vomiting., Disp: 45 tablet, Rfl: 1    Semaglutide-Weight Management (Wegovy) 2.4 MG/0.75ML solution auto-injector, Inject 2.4 mg under the skin into the appropriate area as directed 1 (One) Time Per Week., Disp: 9 mL, Rfl: 0    vitamin D (ERGOCALCIFEROL) 1.25 MG (31606 UT) capsule capsule, TAKE 1 CAPSULE BY MOUTH WEEKLY, Disp: 4 capsule, Rfl: 3    Drug Interactions  None     Relevant Laboratory Values  Lab Results   Component Value Date    CHOL 208 (H) 08/28/2024    CHLPL 199 08/15/2014    TRIG 123 08/28/2024    HDL 56 08/28/2024     (H) 08/28/2024     Vaccinations:   Patient recommended to keep up with routine vaccinations.     Goals of Therapy  Clinical Goals or Therapeutic Targets: 10% weight loss goal      Date 5/2/22 6/2/22 6/30/22 7/28/22 8/25/55 9/22/22   Weight (kg) 91.4 kg  201.08lb 190.6lb 185.4lb 180lb 181lb 175.6lb   BMI kg/ 34.6 32.71 31.8 30.9 31 30   Waist Circumference (in)  42 in 41.5in 40in 40in 39.75in 39.75     Date 10/20/22 11/17/22 12/15/22 1/12/23 2/9/23 3/9/23 4/6/23 5/4/23 6/1/23 6/29/23   Weight (kg) 170 lbs 170.6 lbs 165.8lbs 164.6 lbs 162.6 lb 162.4 lb 163 lb 162 lb 164 lbs 162 lb   BMI kg/  "29.18 29.28 28.46 28.25 27.91 27.88 27.98 27.81 28.14 27.79   Waist Circumference (in)  38 in 37 in 36.5 in 37.75\" 36\" 37.5\" 36\" 35.5\" 37\" 36\"     Date 7/26/23 8/23/23 9/21/23 10/17/23 12/4/23 12/28/23 1/25/24 2/12/24 03/21/24 4/18/24   Weight (kg) 165.4 166 lb 166.2 lb 166.8 lbs 175.2 lb 177.6 lb 176.2 lb 179 lb 178.4 179.4 lb   BMI kg/ 28.37 28.48 28.51 28.61 30.05 30.43 30.23  30.95 30.77   Waist Circumference (in)  37.5 in 36\" 39\" 38.5\" 38\" 39.5\" 41\" 40\" 39.75\" 37\"     Date 5/16/24 6/13/24 7/12/24 8/14/24 9/13/24   Weight (kg) 178.4 lb 178.4 lb 180.6 lb 178.4 lb 176.0 lb   BMI kg/ 30.60 30.60 30.98 30.60 30.21   Waist Circumference (in)  39.5\" 38.75\" 40\" 35.5\" 39\"       Medication Assessment & Plan    Patient's current BMI is 30.21, which is considered overweight. Patient has lost 25 lbs overall. Congratulated her on her success!    Patient has been maintaining weight/slight weight gain.     Will re-order Wegovy 2.4 SC weekly.  3 month supply sent in July due to insurance coverage. Patient aware to maintain routine follow up for duration of fills. No Rx needs sent in at next visits.     The following medications will need dose adjustments/closer monitoring once the GLP1 is started: levothyroxine      Discussed lifestyle modifications, including diet and exercise.  Patient education provided.     Worked with patient to create SMART Goal(s): Patient would like to continue these goals  1. Drink 80 oz of water everyday.   2. Continue to Achieve/Track 10,000 steps every day via her watch.  3. Begin weight bearing exercise at least 2 days/week.   4. Meal Plan Dinner 2 times a week. .    Patient to be discharged today.     Sandie Camilo, PharmD  9/13/2024  08:21 EDT  "

## 2024-10-14 ENCOUNTER — TELEPHONE (OUTPATIENT)
Dept: FAMILY MEDICINE CLINIC | Facility: CLINIC | Age: 54
End: 2024-10-14
Payer: COMMERCIAL

## 2024-10-14 ENCOUNTER — HOSPITAL ENCOUNTER (OUTPATIENT)
Dept: MAMMOGRAPHY | Facility: HOSPITAL | Age: 54
Discharge: HOME | End: 2024-10-14
Admitting: FAMILY MEDICINE
Payer: COMMERCIAL

## 2024-10-14 DIAGNOSIS — Z12.31 VISIT FOR SCREENING MAMMOGRAM: ICD-10-CM

## 2024-10-14 PROCEDURE — 77063 BREAST TOMOSYNTHESIS BI: CPT | Performed by: RADIOLOGY

## 2024-10-14 PROCEDURE — 77067 SCR MAMMO BI INCL CAD: CPT | Performed by: RADIOLOGY

## 2024-10-14 PROCEDURE — 77067 SCR MAMMO BI INCL CAD: CPT

## 2024-10-14 PROCEDURE — 77063 BREAST TOMOSYNTHESIS BI: CPT

## 2024-10-14 NOTE — TELEPHONE ENCOUNTER
----- Message from Nicole Garcia sent at 10/14/2024 11:53 AM EDT -----  Please let patient know results are normal. Thank you.

## 2024-11-30 DIAGNOSIS — Z76.0 MEDICATION REFILL: ICD-10-CM

## 2024-11-30 DIAGNOSIS — E03.9 HYPOTHYROIDISM, UNSPECIFIED TYPE: ICD-10-CM

## 2024-12-02 RX ORDER — LEVOTHYROXINE SODIUM 50 UG/1
50 TABLET ORAL DAILY
Qty: 90 TABLET | Refills: 0 | Status: SHIPPED | OUTPATIENT
Start: 2024-12-02 | End: 2024-12-03 | Stop reason: SDUPTHER

## 2024-12-02 RX ORDER — LISINOPRIL 10 MG/1
10 TABLET ORAL DAILY
Qty: 90 TABLET | Refills: 0 | Status: SHIPPED | OUTPATIENT
Start: 2024-12-02 | End: 2024-12-03 | Stop reason: SDUPTHER

## 2024-12-03 ENCOUNTER — OFFICE VISIT (OUTPATIENT)
Dept: FAMILY MEDICINE CLINIC | Facility: CLINIC | Age: 54
End: 2024-12-03
Payer: COMMERCIAL

## 2024-12-03 VITALS
BODY MASS INDEX: 30.35 KG/M2 | HEIGHT: 64 IN | DIASTOLIC BLOOD PRESSURE: 80 MMHG | OXYGEN SATURATION: 98 % | HEART RATE: 106 BPM | TEMPERATURE: 97.8 F | SYSTOLIC BLOOD PRESSURE: 122 MMHG | WEIGHT: 177.8 LBS

## 2024-12-03 DIAGNOSIS — E78.5 DYSLIPIDEMIA: ICD-10-CM

## 2024-12-03 DIAGNOSIS — E66.811 CLASS 1 OBESITY WITH BODY MASS INDEX (BMI) OF 33.0 TO 33.9 IN ADULT, UNSPECIFIED OBESITY TYPE, UNSPECIFIED WHETHER SERIOUS COMORBIDITY PRESENT: ICD-10-CM

## 2024-12-03 DIAGNOSIS — E03.9 HYPOTHYROIDISM, UNSPECIFIED TYPE: ICD-10-CM

## 2024-12-03 DIAGNOSIS — E55.9 VITAMIN D DEFICIENCY: ICD-10-CM

## 2024-12-03 DIAGNOSIS — Z76.0 MEDICATION REFILL: ICD-10-CM

## 2024-12-03 PROCEDURE — 99214 OFFICE O/P EST MOD 30 MIN: CPT | Performed by: FAMILY MEDICINE

## 2024-12-03 RX ORDER — ERGOCALCIFEROL 1.25 MG/1
CAPSULE, LIQUID FILLED ORAL
Qty: 4 CAPSULE | Refills: 3 | Status: SHIPPED | OUTPATIENT
Start: 2024-12-03

## 2024-12-03 RX ORDER — LISINOPRIL 10 MG/1
10 TABLET ORAL DAILY
Qty: 90 TABLET | Refills: 0 | Status: SHIPPED | OUTPATIENT
Start: 2024-12-03

## 2024-12-03 RX ORDER — CHLORAL HYDRATE 500 MG
1000 CAPSULE ORAL 2 TIMES DAILY WITH MEALS
Qty: 60 CAPSULE | Refills: 5 | Status: SHIPPED | OUTPATIENT
Start: 2024-12-03

## 2024-12-03 RX ORDER — LORATADINE 10 MG/1
10 CAPSULE, LIQUID FILLED ORAL DAILY
Qty: 30 EACH | Refills: 2 | Status: SHIPPED | OUTPATIENT
Start: 2024-12-03

## 2024-12-03 RX ORDER — LEVOTHYROXINE SODIUM 50 UG/1
50 TABLET ORAL DAILY
Qty: 90 TABLET | Refills: 0 | Status: SHIPPED | OUTPATIENT
Start: 2024-12-03

## 2024-12-03 NOTE — PROGRESS NOTES
"Chief Complaint  Weight Check    Subjective          Jaquan Carlson presents to [facility]  Hypertension  This is a recurrent problem. The current episode started more than 1 year ago. The problem has been improved since onset. The problem is controlled. Pertinent negatives include no anxiety, blurred vision, chest pain, headaches, malaise/fatigue, orthopnea, palpitations, peripheral edema or shortness of breath. Agents associated with hypertension include thyroid hormones. Risk factors for coronary artery disease include family history and obesity. There are no compliance problems.    Hypothyroidism  Presents for follow-up visit. Patient reports no palpitations. The symptoms have been stable. Past treatments include levothyroxine.             Patient here to follow up on weight loss. States she is doing well with current medications at this time. Denies any adverse side effects.     Review of Systems   Constitutional:  Negative for malaise/fatigue.   Eyes:  Negative for blurred vision.   Respiratory:  Negative for shortness of breath.    Cardiovascular:  Negative for chest pain, palpitations and orthopnea.   Neurological:  Negative for headaches.         Objective   Vital Signs:   /80 (BP Location: Right arm, Patient Position: Sitting, Cuff Size: Adult)   Pulse 106   Temp 97.8 °F (36.6 °C) (Temporal)   Ht 162.6 cm (64\")   Wt 80.6 kg (177 lb 12.8 oz)   SpO2 98%   BMI 30.52 kg/m²     Physical Exam  Constitutional:       General: She is not in acute distress.     Appearance: Normal appearance. She is well-developed and well-groomed. She is not ill-appearing, toxic-appearing or diaphoretic.   HENT:      Head: Normocephalic.      Nose: Nose normal. No congestion or rhinorrhea.      Mouth/Throat:      Mouth: Mucous membranes are moist.      Pharynx: Oropharynx is clear. No oropharyngeal exudate or posterior oropharyngeal erythema.   Eyes:      General: Lids are normal.    "      Right eye: No discharge.         Left eye: No discharge.      Extraocular Movements: Extraocular movements intact.      Pupils: Pupils are equal, round, and reactive to light.   Neck:      Vascular: No carotid bruit.   Cardiovascular:      Rate and Rhythm: Normal rate and regular rhythm.      Pulses: Normal pulses.      Heart sounds: Normal heart sounds. No murmur heard.     No friction rub. No gallop.   Pulmonary:      Effort: Pulmonary effort is normal. No respiratory distress.      Breath sounds: Normal breath sounds. No stridor. No wheezing, rhonchi or rales.   Chest:      Chest wall: No tenderness.   Abdominal:      General: Bowel sounds are normal. There is no distension.      Palpations: Abdomen is soft. There is no mass.      Tenderness: There is no abdominal tenderness. There is no right CVA tenderness, left CVA tenderness, guarding or rebound.      Hernia: No hernia is present.   Musculoskeletal:         General: No swelling or tenderness. Normal range of motion.      Cervical back: Normal range of motion and neck supple. No rigidity or tenderness.      Right lower leg: No edema.      Left lower leg: No edema.   Lymphadenopathy:      Cervical: No cervical adenopathy.   Skin:     General: Skin is warm.      Capillary Refill: Capillary refill takes less than 2 seconds.      Coloration: Skin is not jaundiced.      Findings: No bruising, erythema or rash.   Neurological:      General: No focal deficit present.      Mental Status: She is alert and oriented to person, place, and time.      Motor: Motor function is intact. No weakness.      Coordination: Coordination is intact.      Gait: Gait is intact. Gait normal.   Psychiatric:         Attention and Perception: Attention normal.         Mood and Affect: Mood normal.         Speech: Speech normal.         Behavior: Behavior normal.         Cognition and Memory: Cognition normal.         Judgment: Judgment normal.        Result Review :                  Assessment and Plan    Diagnoses and all orders for this visit:    1. Hypothyroidism, unspecified type  -     levothyroxine (Synthroid) 50 MCG tablet; Take 1 tablet by mouth Daily.  Dispense: 90 tablet; Refill: 0  -     CBC Auto Differential; Future  -     Comprehensive Metabolic Panel; Future  -     Hemoglobin A1c; Future  -     Lipid Panel; Future  -     TSH; Future  -     T4, Free; Future    2. Medication refill  -     levothyroxine (Synthroid) 50 MCG tablet; Take 1 tablet by mouth Daily.  Dispense: 90 tablet; Refill: 0    3. Dyslipidemia  -     Omega-3 Fatty Acids (fish oil) 1000 MG capsule capsule; Take 1 capsule by mouth 2 (Two) Times a Day With Meals.  Dispense: 60 capsule; Refill: 5    4. Class 1 obesity with body mass index (BMI) of 33.0 to 33.9 in adult, unspecified obesity type, unspecified whether serious comorbidity present  Comments:  artiue wegovy  Orders:  -     Omega-3 Fatty Acids (fish oil) 1000 MG capsule capsule; Take 1 capsule by mouth 2 (Two) Times a Day With Meals.  Dispense: 60 capsule; Refill: 5    5. Vitamin D deficiency  -     vitamin D (ERGOCALCIFEROL) 1.25 MG (69099 UT) capsule capsule; TAKE 1 CAPSULE BY MOUTH WEEKLY  Dispense: 4 capsule; Refill: 3    Other orders  -     lisinopril (PRINIVIL,ZESTRIL) 10 MG tablet; Take 1 tablet by mouth Daily.  Dispense: 90 tablet; Refill: 0  -     Loratadine 10 MG capsule; Take 1 capsule by mouth Daily.  Dispense: 30 each; Refill: 2      Patient's Body mass index is 30.52 kg/m². indicating that she is obese (BMI >30). Obesity-related health conditions include the following: hypertension and dyslipidemias. Obesity is improving with treatment. BMI is is above average; BMI management plan is completed. We discussed low calorie, low carb based diet program, portion control, and increasing exercise..    Follow Up   Return in about 3 months (around 3/3/2025), or if symptoms worsen or fail to improve.  Patient was given instructions and counseling regarding  her condition or for health maintenance advice. Please see specific information pulled into the AVS if appropriate.     This document has been electronically signed by PAVAN Castaneda  December 3, 2024 14:07 EST

## 2025-02-06 ENCOUNTER — LAB (OUTPATIENT)
Dept: LAB | Facility: HOSPITAL | Age: 55
End: 2025-02-06
Payer: COMMERCIAL

## 2025-02-06 ENCOUNTER — TELEPHONE (OUTPATIENT)
Dept: FAMILY MEDICINE CLINIC | Facility: CLINIC | Age: 55
End: 2025-02-06
Payer: COMMERCIAL

## 2025-02-06 DIAGNOSIS — E03.9 HYPOTHYROIDISM, UNSPECIFIED TYPE: ICD-10-CM

## 2025-02-06 LAB
ALBUMIN SERPL-MCNC: 4.1 G/DL (ref 3.5–5.2)
ALBUMIN/GLOB SERPL: 1.4 G/DL
ALP SERPL-CCNC: 84 U/L (ref 39–117)
ALT SERPL W P-5'-P-CCNC: 29 U/L (ref 1–33)
ANION GAP SERPL CALCULATED.3IONS-SCNC: 11 MMOL/L (ref 5–15)
AST SERPL-CCNC: 23 U/L (ref 1–32)
BASOPHILS # BLD AUTO: 0.04 10*3/MM3 (ref 0–0.2)
BASOPHILS NFR BLD AUTO: 0.6 % (ref 0–1.5)
BILIRUB SERPL-MCNC: 0.3 MG/DL (ref 0–1.2)
BUN SERPL-MCNC: 19 MG/DL (ref 6–20)
BUN/CREAT SERPL: 17 (ref 7–25)
CALCIUM SPEC-SCNC: 9.2 MG/DL (ref 8.6–10.5)
CHLORIDE SERPL-SCNC: 105 MMOL/L (ref 98–107)
CHOLEST SERPL-MCNC: 232 MG/DL (ref 0–200)
CO2 SERPL-SCNC: 25 MMOL/L (ref 22–29)
CREAT SERPL-MCNC: 1.12 MG/DL (ref 0.57–1)
DEPRECATED RDW RBC AUTO: 41.9 FL (ref 37–54)
EGFRCR SERPLBLD CKD-EPI 2021: 58.2 ML/MIN/1.73
EOSINOPHIL # BLD AUTO: 0.09 10*3/MM3 (ref 0–0.4)
EOSINOPHIL NFR BLD AUTO: 1.4 % (ref 0.3–6.2)
ERYTHROCYTE [DISTWIDTH] IN BLOOD BY AUTOMATED COUNT: 12.8 % (ref 12.3–15.4)
GLOBULIN UR ELPH-MCNC: 2.9 GM/DL
GLUCOSE SERPL-MCNC: 95 MG/DL (ref 65–99)
HBA1C MFR BLD: 5.7 % (ref 4.8–5.6)
HCT VFR BLD AUTO: 43.4 % (ref 34–46.6)
HDLC SERPL-MCNC: 59 MG/DL (ref 40–60)
HGB BLD-MCNC: 14.6 G/DL (ref 12–15.9)
IMM GRANULOCYTES # BLD AUTO: 0.01 10*3/MM3 (ref 0–0.05)
IMM GRANULOCYTES NFR BLD AUTO: 0.2 % (ref 0–0.5)
LDLC SERPL CALC-MCNC: 145 MG/DL (ref 0–100)
LDLC/HDLC SERPL: 2.41 {RATIO}
LYMPHOCYTES # BLD AUTO: 3.16 10*3/MM3 (ref 0.7–3.1)
LYMPHOCYTES NFR BLD AUTO: 49.3 % (ref 19.6–45.3)
MCH RBC QN AUTO: 29.9 PG (ref 26.6–33)
MCHC RBC AUTO-ENTMCNC: 33.6 G/DL (ref 31.5–35.7)
MCV RBC AUTO: 88.9 FL (ref 79–97)
MONOCYTES # BLD AUTO: 0.41 10*3/MM3 (ref 0.1–0.9)
MONOCYTES NFR BLD AUTO: 6.4 % (ref 5–12)
NEUTROPHILS NFR BLD AUTO: 2.7 10*3/MM3 (ref 1.7–7)
NEUTROPHILS NFR BLD AUTO: 42.1 % (ref 42.7–76)
NRBC BLD AUTO-RTO: 0 /100 WBC (ref 0–0.2)
PLATELET # BLD AUTO: 356 10*3/MM3 (ref 140–450)
PMV BLD AUTO: 10.1 FL (ref 6–12)
POTASSIUM SERPL-SCNC: 4.3 MMOL/L (ref 3.5–5.2)
PROT SERPL-MCNC: 7 G/DL (ref 6–8.5)
RBC # BLD AUTO: 4.88 10*6/MM3 (ref 3.77–5.28)
SODIUM SERPL-SCNC: 141 MMOL/L (ref 136–145)
T4 FREE SERPL-MCNC: 1.19 NG/DL (ref 0.92–1.68)
TRIGL SERPL-MCNC: 155 MG/DL (ref 0–150)
TSH SERPL DL<=0.05 MIU/L-ACNC: 1.57 UIU/ML (ref 0.27–4.2)
VLDLC SERPL-MCNC: 28 MG/DL (ref 5–40)
WBC NRBC COR # BLD AUTO: 6.41 10*3/MM3 (ref 3.4–10.8)

## 2025-02-06 PROCEDURE — 84439 ASSAY OF FREE THYROXINE: CPT

## 2025-02-06 PROCEDURE — 80061 LIPID PANEL: CPT

## 2025-02-06 PROCEDURE — 36415 COLL VENOUS BLD VENIPUNCTURE: CPT

## 2025-02-06 PROCEDURE — 80050 GENERAL HEALTH PANEL: CPT

## 2025-02-06 PROCEDURE — 83036 HEMOGLOBIN GLYCOSYLATED A1C: CPT

## 2025-02-06 NOTE — TELEPHONE ENCOUNTER
----- Message from Nicole Garcia sent at 2/6/2025  1:52 PM EST -----  Please call the patient regarding her abnormal result. Her A1c is back up to 5.7. Her creatinine is still elevated as well. She needs to continue to improve water intake. Her cholesterol is also increased. Continue to improve diet and increase exercise.

## 2025-03-11 ENCOUNTER — OFFICE VISIT (OUTPATIENT)
Dept: FAMILY MEDICINE CLINIC | Facility: CLINIC | Age: 55
End: 2025-03-11
Payer: COMMERCIAL

## 2025-03-11 VITALS
HEIGHT: 64 IN | BODY MASS INDEX: 32.3 KG/M2 | HEART RATE: 76 BPM | DIASTOLIC BLOOD PRESSURE: 80 MMHG | WEIGHT: 189.2 LBS | OXYGEN SATURATION: 95 % | SYSTOLIC BLOOD PRESSURE: 130 MMHG | TEMPERATURE: 98.2 F

## 2025-03-11 DIAGNOSIS — E78.5 DYSLIPIDEMIA: ICD-10-CM

## 2025-03-11 DIAGNOSIS — E03.9 HYPOTHYROIDISM, UNSPECIFIED TYPE: ICD-10-CM

## 2025-03-11 DIAGNOSIS — E66.811 CLASS 1 OBESITY WITH BODY MASS INDEX (BMI) OF 33.0 TO 33.9 IN ADULT, UNSPECIFIED OBESITY TYPE, UNSPECIFIED WHETHER SERIOUS COMORBIDITY PRESENT: ICD-10-CM

## 2025-03-11 DIAGNOSIS — E55.9 VITAMIN D DEFICIENCY: ICD-10-CM

## 2025-03-11 DIAGNOSIS — Z76.0 MEDICATION REFILL: ICD-10-CM

## 2025-03-11 PROCEDURE — 99214 OFFICE O/P EST MOD 30 MIN: CPT | Performed by: FAMILY MEDICINE

## 2025-03-11 RX ORDER — CHLORAL HYDRATE 500 MG
1000 CAPSULE ORAL 2 TIMES DAILY WITH MEALS
Qty: 60 CAPSULE | Refills: 5 | Status: SHIPPED | OUTPATIENT
Start: 2025-03-11

## 2025-03-11 RX ORDER — LISINOPRIL 10 MG/1
10 TABLET ORAL DAILY
Qty: 90 TABLET | Refills: 0 | Status: SHIPPED | OUTPATIENT
Start: 2025-03-11

## 2025-03-11 RX ORDER — LEVOTHYROXINE SODIUM 50 UG/1
50 TABLET ORAL DAILY
Qty: 90 TABLET | Refills: 0 | Status: SHIPPED | OUTPATIENT
Start: 2025-03-11

## 2025-03-11 RX ORDER — TOPIRAMATE 25 MG/1
25 TABLET, FILM COATED ORAL DAILY
Qty: 90 TABLET | Refills: 0 | Status: SHIPPED | OUTPATIENT
Start: 2025-03-11

## 2025-03-11 RX ORDER — LORATADINE 10 MG/1
10 TABLET ORAL DAILY
Qty: 30 TABLET | Refills: 2 | Status: SHIPPED | OUTPATIENT
Start: 2025-03-11

## 2025-03-11 RX ORDER — ERGOCALCIFEROL 1.25 MG/1
50000 CAPSULE, LIQUID FILLED ORAL
Qty: 4 CAPSULE | Refills: 3 | Status: SHIPPED | OUTPATIENT
Start: 2025-03-11

## 2025-03-11 NOTE — PROGRESS NOTES
"Chief Complaint  Hypothyroidism    Subjective          Jaquan Carlson presents to [facility]  Hypothyroidism      History of Present Illness  The patient is a 54-year-old female who presents for a follow-up on hypertension and weight management.    She has been off Wegovy for approximately 2 months, during which she has experienced a weight gain of 14 pounds according to her scale. She was previously on a regimen of 2.4 mL of Wegovy. She has also tried Adipex and Saxenda in the past, but discontinued Saxenda due to nausea. She has found Topamax to be beneficial in the past and is considering its use again. She expresses concern about the potential impact of exercise and increased water intake on her mental health, specifically fearing a resurgence of depression. She is currently prediabetic and is seeking advice on how to manage her condition.    MEDICATIONS  Discontinued: Wegovy, Adipex, Saxenda    Review of Systems      Objective   Vital Signs:   /80 (BP Location: Right arm, Patient Position: Sitting, Cuff Size: Adult)   Pulse 76   Temp 98.2 °F (36.8 °C) (Temporal)   Ht 162.6 cm (64\")   Wt 85.8 kg (189 lb 3.2 oz)   SpO2 95%   BMI 32.48 kg/m²     Physical Exam  Lungs were auscultated.    Vital Signs  Weight is 189.    Physical Exam  Constitutional:       General: She is not in acute distress.     Appearance: Normal appearance. She is well-developed and well-groomed. She is not ill-appearing, toxic-appearing or diaphoretic.   HENT:      Head: Normocephalic.      Nose: Nose normal. No congestion or rhinorrhea.      Mouth/Throat:      Mouth: Mucous membranes are moist.      Pharynx: Oropharynx is clear. No oropharyngeal exudate or posterior oropharyngeal erythema.   Eyes:      General: Lids are normal.         Right eye: No discharge.         Left eye: No discharge.      Extraocular Movements: Extraocular movements intact.      Pupils: Pupils are equal, round, and " reactive to light.   Neck:      Vascular: No carotid bruit.   Cardiovascular:      Rate and Rhythm: Normal rate and regular rhythm.      Pulses: Normal pulses.      Heart sounds: Normal heart sounds. No murmur heard.     No friction rub. No gallop.   Pulmonary:      Effort: Pulmonary effort is normal. No respiratory distress.      Breath sounds: Normal breath sounds. No stridor. No wheezing, rhonchi or rales.   Chest:      Chest wall: No tenderness.   Abdominal:      General: Bowel sounds are normal. There is no distension.      Palpations: Abdomen is soft. There is no mass.      Tenderness: There is no abdominal tenderness. There is no right CVA tenderness, left CVA tenderness, guarding or rebound.      Hernia: No hernia is present.   Musculoskeletal:         General: No swelling or tenderness. Normal range of motion.      Cervical back: Normal range of motion and neck supple. No rigidity or tenderness.      Right lower leg: No edema.      Left lower leg: No edema.   Lymphadenopathy:      Cervical: No cervical adenopathy.   Skin:     General: Skin is warm.      Capillary Refill: Capillary refill takes less than 2 seconds.      Coloration: Skin is not jaundiced.      Findings: No bruising, erythema or rash.   Neurological:      General: No focal deficit present.      Mental Status: She is alert and oriented to person, place, and time.      Motor: Motor function is intact. No weakness.      Coordination: Coordination is intact.      Gait: Gait is intact. Gait normal.   Psychiatric:         Attention and Perception: Attention normal.         Mood and Affect: Mood normal.         Speech: Speech normal.         Behavior: Behavior normal.         Cognition and Memory: Cognition normal.         Judgment: Judgment normal.        Result Review :          Results                Assessment and Plan    Diagnoses and all orders for this visit:    1. Hypothyroidism, unspecified type  -     levothyroxine (Synthroid) 50 MCG  tablet; Take 1 tablet by mouth Daily.  Dispense: 90 tablet; Refill: 0    2. Medication refill  -     levothyroxine (Synthroid) 50 MCG tablet; Take 1 tablet by mouth Daily.  Dispense: 90 tablet; Refill: 0    3. Dyslipidemia  -     Omega-3 Fatty Acids (fish oil) 1000 MG capsule capsule; Take 1 capsule by mouth 2 (Two) Times a Day With Meals.  Dispense: 60 capsule; Refill: 5    4. Class 1 obesity with body mass index (BMI) of 33.0 to 33.9 in adult, unspecified obesity type, unspecified whether serious comorbidity present  Comments:  contiue wegovy  Orders:  -     Omega-3 Fatty Acids (fish oil) 1000 MG capsule capsule; Take 1 capsule by mouth 2 (Two) Times a Day With Meals.  Dispense: 60 capsule; Refill: 5    5. Vitamin D deficiency  -     vitamin D (ERGOCALCIFEROL) 1.25 MG (77290 UT) capsule capsule; Take 1 capsule by mouth Weekly  Dispense: 4 capsule; Refill: 3    Other orders  -     topiramate (Topamax) 25 MG tablet; Take 1 tablet by mouth Daily.  Dispense: 90 tablet; Refill: 0  -     lisinopril (PRINIVIL,ZESTRIL) 10 MG tablet; Take 1 tablet by mouth Daily.  Dispense: 90 tablet; Refill: 0  -     Loratadine 10 MG capsule; Take 1 capsule by mouth Daily.  Dispense: 30 each; Refill: 2      Assessment & Plan  1. Weight management.  She has gained 12 pounds since discontinuing Wegovy two months ago. A trial of Topamax 25 mg daily will be initiated to assess its effectiveness in managing her weight. If needed, the dosage can be increased.    Patient's Body mass index is 32.48 kg/m². indicating that she is obese (BMI >30). Obesity-related health conditions include the following: hypertension and dyslipidemias. Obesity is unchanged. BMI is is above average; BMI management plan is completed. We discussed low calorie, low carb based diet program, portion control, and increasing exercise..    Follow Up   Return in about 3 months (around 6/11/2025), or if symptoms worsen or fail to improve.  Patient was given instructions and  counseling regarding her condition or for health maintenance advice. Please see specific information pulled into the AVS if appropriate.     This document has been electronically signed by PAVAN Castaneda  March 11, 2025 10:33 EDT     Patient or patient representative verbalized consent for the use of Ambient Listening during the visit with  PAVAN Castaneda for chart documentation. 3/11/2025  10:33 EDT

## 2025-06-11 ENCOUNTER — OFFICE VISIT (OUTPATIENT)
Dept: FAMILY MEDICINE CLINIC | Facility: CLINIC | Age: 55
End: 2025-06-11
Payer: COMMERCIAL

## 2025-06-11 VITALS
DIASTOLIC BLOOD PRESSURE: 66 MMHG | HEIGHT: 64 IN | BODY MASS INDEX: 32.61 KG/M2 | TEMPERATURE: 97.8 F | HEART RATE: 85 BPM | OXYGEN SATURATION: 97 % | SYSTOLIC BLOOD PRESSURE: 124 MMHG | WEIGHT: 191 LBS

## 2025-06-11 DIAGNOSIS — E66.811 OBESITY (BMI 30.0-34.9): Primary | ICD-10-CM

## 2025-06-11 DIAGNOSIS — Z76.0 MEDICATION REFILL: ICD-10-CM

## 2025-06-11 DIAGNOSIS — E03.9 HYPOTHYROIDISM, UNSPECIFIED TYPE: ICD-10-CM

## 2025-06-11 DIAGNOSIS — E55.9 VITAMIN D DEFICIENCY: ICD-10-CM

## 2025-06-11 DIAGNOSIS — E66.811 CLASS 1 OBESITY WITH BODY MASS INDEX (BMI) OF 33.0 TO 33.9 IN ADULT, UNSPECIFIED OBESITY TYPE, UNSPECIFIED WHETHER SERIOUS COMORBIDITY PRESENT: ICD-10-CM

## 2025-06-11 DIAGNOSIS — E78.5 DYSLIPIDEMIA: ICD-10-CM

## 2025-06-11 PROCEDURE — 99214 OFFICE O/P EST MOD 30 MIN: CPT | Performed by: FAMILY MEDICINE

## 2025-06-11 RX ORDER — ERGOCALCIFEROL 1.25 MG/1
50000 CAPSULE, LIQUID FILLED ORAL
Qty: 4 CAPSULE | Refills: 3 | Status: SHIPPED | OUTPATIENT
Start: 2025-06-11

## 2025-06-11 RX ORDER — LORATADINE 10 MG/1
10 TABLET ORAL DAILY
Qty: 30 TABLET | Refills: 2 | Status: SHIPPED | OUTPATIENT
Start: 2025-06-11

## 2025-06-11 RX ORDER — PHENTERMINE HYDROCHLORIDE 37.5 MG/1
37.5 TABLET ORAL
Qty: 30 TABLET | Refills: 0 | Status: SHIPPED | OUTPATIENT
Start: 2025-06-11

## 2025-06-11 RX ORDER — LISINOPRIL 10 MG/1
10 TABLET ORAL DAILY
Qty: 90 TABLET | Refills: 0 | Status: SHIPPED | OUTPATIENT
Start: 2025-06-11

## 2025-06-11 RX ORDER — LEVOTHYROXINE SODIUM 50 UG/1
50 TABLET ORAL DAILY
Qty: 90 TABLET | Refills: 0 | Status: SHIPPED | OUTPATIENT
Start: 2025-06-11

## 2025-06-11 RX ORDER — CHLORAL HYDRATE 500 MG
1000 CAPSULE ORAL 2 TIMES DAILY WITH MEALS
Qty: 60 CAPSULE | Refills: 5 | Status: SHIPPED | OUTPATIENT
Start: 2025-06-11

## 2025-06-11 NOTE — PROGRESS NOTES
"Chief Complaint  Weight Loss    Subjective          Jaquan Carlson presents to [facility]  Weight Loss    History of Present Illness  The patient is a 54-year-old female who presents for a follow-up appointment.    She has been informed that she meets certain criteria for the use of metformin and is contemplating its initiation. She reports an increase in hunger, which she perceives as more severe than prior to her injection. Her current weight is comparable to her pre-injection weight. She also mentions experiencing some depressive symptoms related to her weight and weather conditions. She has previously tried Adipex and Topamax, but found them ineffective. She expresses interest in resuming Adipex treatment.    Review of Systems   Constitutional:  Positive for weight loss.         Objective   Vital Signs:   /66 (BP Location: Right arm, Patient Position: Sitting)   Pulse 85   Temp 97.8 °F (36.6 °C)   Ht 162.6 cm (64\")   Wt 86.6 kg (191 lb)   SpO2 97%   BMI 32.79 kg/m²     Physical Exam  Respiratory: Clear to auscultation, no wheezing, rales or rhonchi    Physical Exam  Constitutional:       General: She is not in acute distress.     Appearance: Normal appearance. She is well-developed and well-groomed. She is not ill-appearing, toxic-appearing or diaphoretic.   HENT:      Head: Normocephalic.      Nose: Nose normal. No congestion or rhinorrhea.      Mouth/Throat:      Mouth: Mucous membranes are moist.      Pharynx: Oropharynx is clear. No oropharyngeal exudate or posterior oropharyngeal erythema.   Eyes:      General: Lids are normal.         Right eye: No discharge.         Left eye: No discharge.      Extraocular Movements: Extraocular movements intact.      Pupils: Pupils are equal, round, and reactive to light.   Neck:      Vascular: No carotid bruit.   Cardiovascular:      Rate and Rhythm: Normal rate and regular rhythm.      Pulses: Normal pulses.      Heart " sounds: Normal heart sounds. No murmur heard.     No friction rub. No gallop.   Pulmonary:      Effort: Pulmonary effort is normal. No respiratory distress.      Breath sounds: Normal breath sounds. No stridor. No wheezing, rhonchi or rales.   Chest:      Chest wall: No tenderness.   Abdominal:      General: Bowel sounds are normal. There is no distension.      Palpations: Abdomen is soft. There is no mass.      Tenderness: There is no abdominal tenderness. There is no right CVA tenderness, left CVA tenderness, guarding or rebound.      Hernia: No hernia is present.   Musculoskeletal:         General: No swelling or tenderness. Normal range of motion.      Cervical back: Normal range of motion and neck supple. No rigidity or tenderness.      Right lower leg: No edema.      Left lower leg: No edema.   Lymphadenopathy:      Cervical: No cervical adenopathy.   Skin:     General: Skin is warm.      Capillary Refill: Capillary refill takes less than 2 seconds.      Coloration: Skin is not jaundiced.      Findings: No bruising, erythema or rash.   Neurological:      General: No focal deficit present.      Mental Status: She is alert and oriented to person, place, and time.      Motor: Motor function is intact. No weakness.      Coordination: Coordination is intact.      Gait: Gait is intact. Gait normal.   Psychiatric:         Attention and Perception: Attention normal.         Mood and Affect: Mood normal.         Speech: Speech normal.         Behavior: Behavior normal.         Cognition and Memory: Cognition normal.         Judgment: Judgment normal.        Result Review :          Results                Assessment and Plan    Diagnoses and all orders for this visit:    1. Obesity (BMI 30.0-34.9) (Primary)  -     phentermine (Adipex-P) 37.5 MG tablet; Take 1 tablet by mouth Every Morning Before Breakfast.  Dispense: 30 tablet; Refill: 0  -     CBC Auto Differential; Future  -     Comprehensive Metabolic Panel;  Future  -     Hemoglobin A1c; Future  -     Lipid Panel; Future  -     T4, Free; Future  -     TSH; Future    2. Hypothyroidism, unspecified type  -     levothyroxine (Synthroid) 50 MCG tablet; Take 1 tablet by mouth Daily.  Dispense: 90 tablet; Refill: 0    3. Medication refill  -     levothyroxine (Synthroid) 50 MCG tablet; Take 1 tablet by mouth Daily.  Dispense: 90 tablet; Refill: 0    4. Dyslipidemia  -     Omega-3 Fatty Acids (fish oil) 1000 MG capsule capsule; Take 1 capsule by mouth 2 (Two) Times a Day With Meals.  Dispense: 60 capsule; Refill: 5    5. Class 1 obesity with body mass index (BMI) of 33.0 to 33.9 in adult, unspecified obesity type, unspecified whether serious comorbidity present  Comments:  contiue wegovy  Orders:  -     Omega-3 Fatty Acids (fish oil) 1000 MG capsule capsule; Take 1 capsule by mouth 2 (Two) Times a Day With Meals.  Dispense: 60 capsule; Refill: 5    6. Vitamin D deficiency  -     vitamin D (ERGOCALCIFEROL) 1.25 MG (45913 UT) capsule capsule; Take 1 capsule by mouth Every 7 (Seven) Days.  Dispense: 4 capsule; Refill: 3    Other orders  -     metFORMIN (GLUCOPHAGE) 500 MG tablet; Take 1 tablet by mouth 2 (Two) Times a Day With Meals.  Dispense: 180 tablet; Refill: 0  -     lisinopril (PRINIVIL,ZESTRIL) 10 MG tablet; Take 1 tablet by mouth Daily.  Dispense: 90 tablet; Refill: 0  -     loratadine (CLARITIN) 10 MG tablet; Take 1 tablet by mouth Daily.  Dispense: 30 tablet; Refill: 2      Assessment & Plan  1. Weight management.  - Blood pressure readings are within the normal range, and no palpitations reported.  - Increased hunger and weight gain noted.  - Prescription for metformin to be taken once daily for the initial 1 to 2 weeks due to potential gastrointestinal side effects. Prescription for Adipex will also be provided.  - Urine test and controlled substance agreement required for Adipex. Laboratory tests will be ordered.    Follow-up  - Follow-up in 1 month for Adipex  refill.    Patient's Body mass index is 32.79 kg/m². indicating that she is obese (BMI >30). Obesity-related health conditions include the following: hypertension and dyslipidemias. Obesity is unchanged. BMI is is above average; BMI management plan is completed. We discussed low calorie, low carb based diet program, portion control, and increasing exercise..    Follow Up   Return in about 4 weeks (around 7/9/2025), or labs today.  Patient was given instructions and counseling regarding her condition or for health maintenance advice. Please see specific information pulled into the AVS if appropriate.     This document has been electronically signed by PAVAN Castaneda  June 11, 2025 10:41 EDT     Patient or patient representative verbalized consent for the use of Ambient Listening during the visit with  PAVAN Castaneda for chart documentation. 6/11/2025  10:41 EDT

## 2025-06-13 ENCOUNTER — LAB (OUTPATIENT)
Dept: FAMILY MEDICINE CLINIC | Facility: CLINIC | Age: 55
End: 2025-06-13
Payer: COMMERCIAL

## 2025-06-13 DIAGNOSIS — E66.811 OBESITY (BMI 30.0-34.9): ICD-10-CM

## 2025-06-13 LAB
ALBUMIN SERPL-MCNC: 3.9 G/DL (ref 3.5–5.2)
ALBUMIN/GLOB SERPL: 1.2 G/DL
ALP SERPL-CCNC: 92 U/L (ref 39–117)
ALT SERPL W P-5'-P-CCNC: 25 U/L (ref 1–33)
ANION GAP SERPL CALCULATED.3IONS-SCNC: 10.5 MMOL/L (ref 5–15)
AST SERPL-CCNC: 19 U/L (ref 1–32)
BASOPHILS # BLD AUTO: 0.05 10*3/MM3 (ref 0–0.2)
BASOPHILS NFR BLD AUTO: 0.8 % (ref 0–1.5)
BILIRUB SERPL-MCNC: 0.2 MG/DL (ref 0–1.2)
BUN SERPL-MCNC: 8 MG/DL (ref 6–20)
BUN/CREAT SERPL: 7.3 (ref 7–25)
CALCIUM SPEC-SCNC: 9.4 MG/DL (ref 8.6–10.5)
CHLORIDE SERPL-SCNC: 110 MMOL/L (ref 98–107)
CHOLEST SERPL-MCNC: 219 MG/DL (ref 0–200)
CO2 SERPL-SCNC: 24.5 MMOL/L (ref 22–29)
CREAT SERPL-MCNC: 1.1 MG/DL (ref 0.57–1)
DEPRECATED RDW RBC AUTO: 43.6 FL (ref 37–54)
EGFRCR SERPLBLD CKD-EPI 2021: 59.5 ML/MIN/1.73
EOSINOPHIL # BLD AUTO: 0.11 10*3/MM3 (ref 0–0.4)
EOSINOPHIL NFR BLD AUTO: 1.8 % (ref 0.3–6.2)
ERYTHROCYTE [DISTWIDTH] IN BLOOD BY AUTOMATED COUNT: 13 % (ref 12.3–15.4)
GLOBULIN UR ELPH-MCNC: 3.3 GM/DL
GLUCOSE SERPL-MCNC: 99 MG/DL (ref 65–99)
HBA1C MFR BLD: 5.8 % (ref 4.8–5.6)
HCT VFR BLD AUTO: 45.4 % (ref 34–46.6)
HDLC SERPL-MCNC: 58 MG/DL (ref 40–60)
HGB BLD-MCNC: 14.8 G/DL (ref 12–15.9)
IMM GRANULOCYTES # BLD AUTO: 0.01 10*3/MM3 (ref 0–0.05)
IMM GRANULOCYTES NFR BLD AUTO: 0.2 % (ref 0–0.5)
LDLC SERPL CALC-MCNC: 143 MG/DL (ref 0–100)
LDLC/HDLC SERPL: 2.42 {RATIO}
LYMPHOCYTES # BLD AUTO: 2.79 10*3/MM3 (ref 0.7–3.1)
LYMPHOCYTES NFR BLD AUTO: 46.8 % (ref 19.6–45.3)
MCH RBC QN AUTO: 29.9 PG (ref 26.6–33)
MCHC RBC AUTO-ENTMCNC: 32.6 G/DL (ref 31.5–35.7)
MCV RBC AUTO: 91.7 FL (ref 79–97)
MONOCYTES # BLD AUTO: 0.39 10*3/MM3 (ref 0.1–0.9)
MONOCYTES NFR BLD AUTO: 6.5 % (ref 5–12)
NEUTROPHILS NFR BLD AUTO: 2.61 10*3/MM3 (ref 1.7–7)
NEUTROPHILS NFR BLD AUTO: 43.9 % (ref 42.7–76)
NRBC BLD AUTO-RTO: 0 /100 WBC (ref 0–0.2)
PLATELET # BLD AUTO: 314 10*3/MM3 (ref 140–450)
PMV BLD AUTO: 10.7 FL (ref 6–12)
POTASSIUM SERPL-SCNC: 4.3 MMOL/L (ref 3.5–5.2)
PROT SERPL-MCNC: 7.2 G/DL (ref 6–8.5)
RBC # BLD AUTO: 4.95 10*6/MM3 (ref 3.77–5.28)
SODIUM SERPL-SCNC: 145 MMOL/L (ref 136–145)
T4 FREE SERPL-MCNC: 1.25 NG/DL (ref 0.92–1.68)
TRIGL SERPL-MCNC: 104 MG/DL (ref 0–150)
TSH SERPL DL<=0.05 MIU/L-ACNC: 1.33 UIU/ML (ref 0.27–4.2)
VLDLC SERPL-MCNC: 18 MG/DL (ref 5–40)
WBC NRBC COR # BLD AUTO: 5.96 10*3/MM3 (ref 3.4–10.8)

## 2025-06-13 PROCEDURE — 36415 COLL VENOUS BLD VENIPUNCTURE: CPT

## 2025-06-13 PROCEDURE — 80061 LIPID PANEL: CPT | Performed by: FAMILY MEDICINE

## 2025-06-13 PROCEDURE — 80050 GENERAL HEALTH PANEL: CPT | Performed by: FAMILY MEDICINE

## 2025-06-13 PROCEDURE — 83036 HEMOGLOBIN GLYCOSYLATED A1C: CPT | Performed by: FAMILY MEDICINE

## 2025-06-13 PROCEDURE — 84439 ASSAY OF FREE THYROXINE: CPT | Performed by: FAMILY MEDICINE

## 2025-06-16 ENCOUNTER — RESULTS FOLLOW-UP (OUTPATIENT)
Dept: FAMILY MEDICINE CLINIC | Facility: CLINIC | Age: 55
End: 2025-06-16
Payer: COMMERCIAL

## 2025-07-09 ENCOUNTER — OFFICE VISIT (OUTPATIENT)
Dept: FAMILY MEDICINE CLINIC | Facility: CLINIC | Age: 55
End: 2025-07-09
Payer: COMMERCIAL

## 2025-07-09 VITALS
TEMPERATURE: 98.4 F | HEART RATE: 87 BPM | DIASTOLIC BLOOD PRESSURE: 80 MMHG | WEIGHT: 199 LBS | SYSTOLIC BLOOD PRESSURE: 120 MMHG | OXYGEN SATURATION: 98 % | BODY MASS INDEX: 33.97 KG/M2 | HEIGHT: 64 IN

## 2025-07-09 DIAGNOSIS — E66.811 OBESITY (BMI 30.0-34.9): Primary | ICD-10-CM

## 2025-07-09 PROCEDURE — 99214 OFFICE O/P EST MOD 30 MIN: CPT | Performed by: FAMILY MEDICINE

## 2025-07-09 RX ORDER — BUPROPION HYDROCHLORIDE 150 MG/1
150 TABLET ORAL DAILY
Qty: 90 TABLET | Refills: 0 | Status: SHIPPED | OUTPATIENT
Start: 2025-07-09

## 2025-07-09 NOTE — PROGRESS NOTES
"Chief Complaint  Weight Check    Subjective          Jaquan Carlson presents to [facility]  History of Present Illness  History of Present Illness  The patient is a 54-year-old female who presents for a follow-up appointment.    She began taking Adipex but decided to discontinue it during her vacation. She continued with metformin, which caused frequent bathroom visits. Her weight remained stable at 193 pounds before and after her vacation. However, she experienced severe illness the day after her return, which she attributes to travel fatigue. Over the past week, she lost 5 pounds due to illness, which was accompanied by chills. A COVID-19 test was negative, as were tests for influenza A and B. She has not resumed Adipex but plans to do so today. Despite being prescribed two doses of metformin, she has only been taking one due to its side effects.    She has been struggling with weight loss, even after eliminating sugar and carbs from her diet. She has undergone ablation and tubal ligation procedures and is unsure when she entered menopause. She experienced hot flashes and took hormone replacement therapy, which led to weight gain.    PAST SURGICAL HISTORY:  - Ablation  - Tubal ligation    Review of Systems      Objective   Vital Signs:   /80 (BP Location: Right arm, Patient Position: Sitting, Cuff Size: Adult)   Pulse 87   Temp 98.4 °F (36.9 °C) (Temporal)   Ht 162.6 cm (64\")   Wt 90.3 kg (199 lb)   SpO2 98%   BMI 34.16 kg/m²     Physical Exam      Physical Exam  Constitutional:       General: She is not in acute distress.     Appearance: Normal appearance. She is well-developed and well-groomed. She is not ill-appearing, toxic-appearing or diaphoretic.   HENT:      Head: Normocephalic.      Nose: Nose normal. No congestion or rhinorrhea.      Mouth/Throat:      Mouth: Mucous membranes are moist.      Pharynx: Oropharynx is clear. No oropharyngeal exudate or " posterior oropharyngeal erythema.   Eyes:      General: Lids are normal.         Right eye: No discharge.         Left eye: No discharge.      Extraocular Movements: Extraocular movements intact.      Pupils: Pupils are equal, round, and reactive to light.   Neck:      Vascular: No carotid bruit.   Cardiovascular:      Rate and Rhythm: Normal rate and regular rhythm.      Pulses: Normal pulses.      Heart sounds: Normal heart sounds. No murmur heard.     No friction rub. No gallop.   Pulmonary:      Effort: Pulmonary effort is normal. No respiratory distress.      Breath sounds: Normal breath sounds. No stridor. No wheezing, rhonchi or rales.   Chest:      Chest wall: No tenderness.   Abdominal:      General: Bowel sounds are normal. There is no distension.      Palpations: Abdomen is soft. There is no mass.      Tenderness: There is no abdominal tenderness. There is no right CVA tenderness, left CVA tenderness, guarding or rebound.      Hernia: No hernia is present.   Musculoskeletal:         General: No swelling or tenderness. Normal range of motion.      Cervical back: Normal range of motion and neck supple. No rigidity or tenderness.      Right lower leg: No edema.      Left lower leg: No edema.   Lymphadenopathy:      Cervical: No cervical adenopathy.   Skin:     General: Skin is warm.      Capillary Refill: Capillary refill takes less than 2 seconds.      Coloration: Skin is not jaundiced.      Findings: No bruising, erythema or rash.   Neurological:      General: No focal deficit present.      Mental Status: She is alert and oriented to person, place, and time.      Motor: Motor function is intact. No weakness.      Coordination: Coordination is intact.      Gait: Gait is intact. Gait normal.   Psychiatric:         Attention and Perception: Attention normal.         Mood and Affect: Mood normal.         Speech: Speech normal.         Behavior: Behavior normal.         Cognition and Memory: Cognition normal.          Judgment: Judgment normal.        Result Review :          Results  Labs   - COVID test: Negative   - Flu A and B tests: Negative              Assessment and Plan    There are no diagnoses linked to this encounter.  Assessment & Plan  1. Weight management.  - Reported starting Adipex but paused it during vacation.  - Experienced gastrointestinal issues with metformin, leading to frequent bathroom visits.  - Maintained weight during vacation but gained six pounds afterward.  - Will discontinue metformin due to side effects and start Wellbutrin, which may help with weight loss and control cravings. Will resume Adipex today.    Patient's Body mass index is 34.16 kg/m². indicating that she is obese (BMI >30). Obesity-related health conditions include the following: hypertension. Obesity is unchanged. BMI is is above average; BMI management plan is completed. We discussed low calorie, low carb based diet program, portion control, and increasing exercise..    Follow Up   Return in about 4 weeks (around 8/6/2025), or if symptoms worsen or fail to improve.  Patient was given instructions and counseling regarding her condition or for health maintenance advice. Please see specific information pulled into the AVS if appropriate.     This document has been electronically signed by PAVAN Castaneda  July 9, 2025 09:55 EDT     Patient or patient representative verbalized consent for the use of Ambient Listening during the visit with  PAVAN Castaneda for chart documentation. 7/9/2025  11:05 EDT

## 2025-08-11 ENCOUNTER — OFFICE VISIT (OUTPATIENT)
Dept: FAMILY MEDICINE CLINIC | Facility: CLINIC | Age: 55
End: 2025-08-11
Payer: COMMERCIAL

## 2025-08-11 VITALS
OXYGEN SATURATION: 98 % | BODY MASS INDEX: 33.57 KG/M2 | HEART RATE: 88 BPM | TEMPERATURE: 97.8 F | DIASTOLIC BLOOD PRESSURE: 78 MMHG | SYSTOLIC BLOOD PRESSURE: 134 MMHG | HEIGHT: 64 IN | WEIGHT: 196.6 LBS

## 2025-08-11 DIAGNOSIS — E66.811 OBESITY (BMI 30.0-34.9): ICD-10-CM

## 2025-08-11 PROCEDURE — 99214 OFFICE O/P EST MOD 30 MIN: CPT | Performed by: FAMILY MEDICINE

## 2025-08-11 RX ORDER — BUPROPION HYDROCHLORIDE 300 MG/1
300 TABLET ORAL DAILY
Qty: 90 TABLET | Refills: 0 | Status: SHIPPED | OUTPATIENT
Start: 2025-08-11

## 2025-08-11 RX ORDER — PHENTERMINE HYDROCHLORIDE 37.5 MG/1
37.5 TABLET ORAL
Qty: 30 TABLET | Refills: 0 | Status: SHIPPED | OUTPATIENT
Start: 2025-08-11

## (undated) DEVICE — PENCL E/S HNDSWCH PUSHBTN HOLSTR 10FT

## (undated) DEVICE — PK LAP GEN 70

## (undated) DEVICE — HARMONIC ACE +7 LAPAROSCOPIC SHEARS ADVANCED HEMOSTASIS 5MM DIAMETER 36CM SHAFT LENGTH  FOR USE WITH GRAY HAND PIECE ONLY: Brand: HARMONIC ACE

## (undated) DEVICE — STERILE COTTON BALLS LARGE 5/P: Brand: MEDLINE

## (undated) DEVICE — SUT VIC 3/0 SH 27IN J416H

## (undated) DEVICE — INSUFFLATION NEEDLE TO ESTABLISH PNEUMOPERITONEUM.: Brand: INSUFFLATION NEEDLE

## (undated) DEVICE — DRSNG SURESITE WNDW 4X4.5

## (undated) DEVICE — PK EXTREM LOWR 70

## (undated) DEVICE — ENDOCUT SCISSOR TIP, DISPOSABLE: Brand: RENEW

## (undated) DEVICE — UNDYED BRAIDED (POLYGLACTIN 910), SYNTHETIC ABSORBABLE SUTURE: Brand: COATED VICRYL

## (undated) DEVICE — HOLDER: Brand: DEROYAL

## (undated) DEVICE — ECHELON FLEX45 ENDOPATH STAPLER, ARTICULATING ENDOSCOPIC LINEAR CUTTER (NO CARTRIDGE): Brand: ECHELON ENDOPATH

## (undated) DEVICE — [HIGH FLOW INSUFFLATOR,  DO NOT USE IF PACKAGE IS DAMAGED,  KEEP DRY,  KEEP AWAY FROM SUNLIGHT,  PROTECT FROM HEAT AND RADIOACTIVE SOURCES.]: Brand: PNEUMOSURE

## (undated) DEVICE — ENCORE® LATEX MICRO SIZE 7.5, STERILE LATEX POWDER-FREE SURGICAL GLOVE: Brand: ENCORE

## (undated) DEVICE — TROCAR: Brand: KII SLEEVE

## (undated) DEVICE — APPL CHLORAPREP W/TINT 26ML ORNG

## (undated) DEVICE — ENDOPATH XCEL BLADELESS TROCARS WITH STABILITY SLEEVES: Brand: ENDOPATH XCEL

## (undated) DEVICE — ELECTRD BLD EDGE/INSUL1P SFTY SLV 2.75IN

## (undated) DEVICE — ENCORE® LATEX MICRO SIZE 7, STERILE LATEX POWDER-FREE SURGICAL GLOVE: Brand: ENCORE

## (undated) DEVICE — SUT ETHIB 0/0 MO6 I8IN CX45D

## (undated) DEVICE — DBD-DRAPE,LAP,CHOLE,W/TROUGHS,STERILE: Brand: MEDLINE

## (undated) DEVICE — 2, DISPOSABLE SUCTION/IRRIGATOR WITH DISPOSABLE TIP: Brand: STRYKEFLOW

## (undated) DEVICE — PAD GRND REM POLYHESIVE A/ DISP

## (undated) DEVICE — ENDOPOUCH RETRIEVER SPECIMEN RETRIEVAL BAGS: Brand: ENDOPOUCH RETRIEVER

## (undated) DEVICE — BNDG ADHS CURAD FLX/FABRC 2X4IN STRL LF

## (undated) DEVICE — TROCAR: Brand: KII FIOS FIRST ENTRY

## (undated) DEVICE — ANTIBACTERIAL UNDYED BRAIDED (POLYGLACTIN 910), SYNTHETIC ABSORBABLE SUTURE: Brand: COATED VICRYL

## (undated) DEVICE — 3M™ STERI-STRIP™ REINFORCED ADHESIVE SKIN CLOSURES, R1547, 1/2 IN X 4 IN (12 MM X 100 MM), 6 STRIPS/ENVELOPE: Brand: 3M™ STERI-STRIP™